# Patient Record
Sex: FEMALE | Race: WHITE | Employment: OTHER | URBAN - NONMETROPOLITAN AREA
[De-identification: names, ages, dates, MRNs, and addresses within clinical notes are randomized per-mention and may not be internally consistent; named-entity substitution may affect disease eponyms.]

---

## 2017-01-13 ENCOUNTER — OFFICE VISIT (OUTPATIENT)
Dept: CARDIOLOGY | Age: 77
End: 2017-01-13
Payer: MEDICARE

## 2017-01-13 VITALS
WEIGHT: 141 LBS | HEART RATE: 68 BPM | HEIGHT: 65 IN | SYSTOLIC BLOOD PRESSURE: 122 MMHG | DIASTOLIC BLOOD PRESSURE: 60 MMHG | BODY MASS INDEX: 23.49 KG/M2

## 2017-01-13 DIAGNOSIS — I25.10 ATHEROSCLEROSIS OF NATIVE CORONARY ARTERY OF NATIVE HEART WITHOUT ANGINA PECTORIS: Primary | ICD-10-CM

## 2017-01-13 DIAGNOSIS — R55 VASOVAGAL SYNCOPE: ICD-10-CM

## 2017-01-13 DIAGNOSIS — I10 ESSENTIAL HYPERTENSION, BENIGN: ICD-10-CM

## 2017-01-13 PROCEDURE — 1090F PRES/ABSN URINE INCON ASSESS: CPT | Performed by: CLINICAL NURSE SPECIALIST

## 2017-01-13 PROCEDURE — G8420 CALC BMI NORM PARAMETERS: HCPCS | Performed by: CLINICAL NURSE SPECIALIST

## 2017-01-13 PROCEDURE — 99213 OFFICE O/P EST LOW 20 MIN: CPT | Performed by: CLINICAL NURSE SPECIALIST

## 2017-01-13 PROCEDURE — G8399 PT W/DXA RESULTS DOCUMENT: HCPCS | Performed by: CLINICAL NURSE SPECIALIST

## 2017-01-13 PROCEDURE — G8599 NO ASA/ANTIPLAT THER USE RNG: HCPCS | Performed by: CLINICAL NURSE SPECIALIST

## 2017-01-13 PROCEDURE — 4040F PNEUMOC VAC/ADMIN/RCVD: CPT | Performed by: CLINICAL NURSE SPECIALIST

## 2017-01-13 PROCEDURE — 93000 ELECTROCARDIOGRAM COMPLETE: CPT | Performed by: CLINICAL NURSE SPECIALIST

## 2017-01-13 PROCEDURE — G8427 DOCREV CUR MEDS BY ELIG CLIN: HCPCS | Performed by: CLINICAL NURSE SPECIALIST

## 2017-01-13 PROCEDURE — 1123F ACP DISCUSS/DSCN MKR DOCD: CPT | Performed by: CLINICAL NURSE SPECIALIST

## 2017-01-13 PROCEDURE — G8484 FLU IMMUNIZE NO ADMIN: HCPCS | Performed by: CLINICAL NURSE SPECIALIST

## 2017-01-13 PROCEDURE — 1036F TOBACCO NON-USER: CPT | Performed by: CLINICAL NURSE SPECIALIST

## 2017-01-13 ASSESSMENT — ENCOUNTER SYMPTOMS
BLURRED VISION: 0
COUGH: 0
HEARTBURN: 0
NAUSEA: 1
SHORTNESS OF BREATH: 0
ORTHOPNEA: 0
VOMITING: 0

## 2017-02-14 ENCOUNTER — TELEPHONE (OUTPATIENT)
Dept: CARDIOLOGY | Age: 77
End: 2017-02-14

## 2017-04-07 ENCOUNTER — OFFICE VISIT (OUTPATIENT)
Dept: CARDIOLOGY | Age: 77
End: 2017-04-07
Payer: MEDICARE

## 2017-04-07 VITALS
BODY MASS INDEX: 23.82 KG/M2 | DIASTOLIC BLOOD PRESSURE: 60 MMHG | WEIGHT: 143 LBS | SYSTOLIC BLOOD PRESSURE: 138 MMHG | HEART RATE: 84 BPM | HEIGHT: 65 IN

## 2017-04-07 DIAGNOSIS — I10 ESSENTIAL HYPERTENSION, BENIGN: ICD-10-CM

## 2017-04-07 DIAGNOSIS — Z95.818 STATUS POST PLACEMENT OF IMPLANTABLE LOOP RECORDER: ICD-10-CM

## 2017-04-07 DIAGNOSIS — I49.9 IRREGULAR HEART RATE: Primary | ICD-10-CM

## 2017-04-07 DIAGNOSIS — I25.10 ATHEROSCLEROSIS OF NATIVE CORONARY ARTERY OF NATIVE HEART WITHOUT ANGINA PECTORIS: ICD-10-CM

## 2017-04-07 PROCEDURE — 1090F PRES/ABSN URINE INCON ASSESS: CPT | Performed by: CLINICAL NURSE SPECIALIST

## 2017-04-07 PROCEDURE — G8420 CALC BMI NORM PARAMETERS: HCPCS | Performed by: CLINICAL NURSE SPECIALIST

## 2017-04-07 PROCEDURE — G8427 DOCREV CUR MEDS BY ELIG CLIN: HCPCS | Performed by: CLINICAL NURSE SPECIALIST

## 2017-04-07 PROCEDURE — 1036F TOBACCO NON-USER: CPT | Performed by: CLINICAL NURSE SPECIALIST

## 2017-04-07 PROCEDURE — 93000 ELECTROCARDIOGRAM COMPLETE: CPT | Performed by: CLINICAL NURSE SPECIALIST

## 2017-04-07 PROCEDURE — 99213 OFFICE O/P EST LOW 20 MIN: CPT | Performed by: CLINICAL NURSE SPECIALIST

## 2017-04-07 PROCEDURE — 1123F ACP DISCUSS/DSCN MKR DOCD: CPT | Performed by: CLINICAL NURSE SPECIALIST

## 2017-04-07 PROCEDURE — 4040F PNEUMOC VAC/ADMIN/RCVD: CPT | Performed by: CLINICAL NURSE SPECIALIST

## 2017-04-07 PROCEDURE — G8399 PT W/DXA RESULTS DOCUMENT: HCPCS | Performed by: CLINICAL NURSE SPECIALIST

## 2017-04-07 PROCEDURE — G8599 NO ASA/ANTIPLAT THER USE RNG: HCPCS | Performed by: CLINICAL NURSE SPECIALIST

## 2017-04-07 RX ORDER — SIMVASTATIN 40 MG
40 TABLET ORAL NIGHTLY
Status: ON HOLD | COMMUNITY
Start: 2017-03-21 | End: 2018-11-02

## 2017-06-25 ENCOUNTER — HOSPITAL ENCOUNTER (EMERGENCY)
Age: 77
Discharge: HOME | End: 2017-06-25
Payer: MEDICARE

## 2017-06-25 ENCOUNTER — APPOINTMENT (OUTPATIENT)
Dept: GENERAL RADIOLOGY | Age: 77
End: 2017-06-25
Payer: MEDICARE

## 2017-06-25 VITALS
WEIGHT: 144 LBS | OXYGEN SATURATION: 94 % | SYSTOLIC BLOOD PRESSURE: 179 MMHG | HEIGHT: 65 IN | TEMPERATURE: 98.1 F | RESPIRATION RATE: 18 BRPM | HEART RATE: 67 BPM | BODY MASS INDEX: 23.99 KG/M2 | DIASTOLIC BLOOD PRESSURE: 70 MMHG

## 2017-06-25 DIAGNOSIS — S62.337A CLOSED DISPLACED FRACTURE OF NECK OF FIFTH METACARPAL BONE OF LEFT HAND, INITIAL ENCOUNTER: Primary | ICD-10-CM

## 2017-06-25 DIAGNOSIS — S80.02XA CONTUSION OF LEFT KNEE, INITIAL ENCOUNTER: ICD-10-CM
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
## 2017-06-25 PROCEDURE — 29125 APPL SHORT ARM SPLINT STATIC: CPT

## 2017-06-25 PROCEDURE — 73560 X-RAY EXAM OF KNEE 1 OR 2: CPT

## 2017-06-25 PROCEDURE — 29125 APPL SHORT ARM SPLINT STATIC: CPT | Performed by: NURSE PRACTITIONER

## 2017-06-25 PROCEDURE — 99283 EMERGENCY DEPT VISIT LOW MDM: CPT

## 2017-06-25 PROCEDURE — 99283 EMERGENCY DEPT VISIT LOW MDM: CPT | Performed by: NURSE PRACTITIONER

## 2017-06-25 PROCEDURE — 73130 X-RAY EXAM OF HAND: CPT

## 2017-06-25 RX ORDER — HYDROCODONE BITARTRATE AND ACETAMINOPHEN 5; 325 MG/1; MG/1
1 TABLET ORAL EVERY 6 HOURS PRN
Qty: 20 TABLET | Refills: 0 | Status: SHIPPED | OUTPATIENT
Start: 2017-06-25 | End: 2017-06-26

## 2017-06-25 ASSESSMENT — PAIN SCALES - GENERAL
PAINLEVEL_OUTOF10: 1
PAINLEVEL_OUTOF10: 8

## 2017-06-25 ASSESSMENT — ENCOUNTER SYMPTOMS: RESPIRATORY NEGATIVE: 1

## 2017-06-25 ASSESSMENT — PAIN DESCRIPTION - PAIN TYPE: TYPE: ACUTE PAIN

## 2017-06-25 ASSESSMENT — PAIN DESCRIPTION - DESCRIPTORS: DESCRIPTORS: ACHING;DULL

## 2017-06-25 ASSESSMENT — PAIN DESCRIPTION - LOCATION: LOCATION: HAND

## 2017-06-25 ASSESSMENT — PAIN DESCRIPTION - ORIENTATION: ORIENTATION: LEFT

## 2017-06-26 ENCOUNTER — OFFICE VISIT (OUTPATIENT)
Dept: VASCULAR SURGERY | Age: 77
End: 2017-06-26
Payer: MEDICARE

## 2017-06-26 ENCOUNTER — HOSPITAL ENCOUNTER (OUTPATIENT)
Dept: VASCULAR LAB | Age: 77
Discharge: HOME | End: 2017-06-26
Payer: MEDICARE

## 2017-06-26 VITALS
TEMPERATURE: 96.2 F | HEART RATE: 60 BPM | DIASTOLIC BLOOD PRESSURE: 52 MMHG | RESPIRATION RATE: 18 BRPM | SYSTOLIC BLOOD PRESSURE: 120 MMHG

## 2017-06-26 DIAGNOSIS — I65.23 BILATERAL CAROTID ARTERY STENOSIS: Primary | ICD-10-CM

## 2017-06-26 DIAGNOSIS — I65.23 BILATERAL CAROTID ARTERY STENOSIS: ICD-10-CM

## 2017-06-26 PROCEDURE — G8427 DOCREV CUR MEDS BY ELIG CLIN: HCPCS | Performed by: NURSE PRACTITIONER

## 2017-06-26 PROCEDURE — G8599 NO ASA/ANTIPLAT THER USE RNG: HCPCS | Performed by: NURSE PRACTITIONER

## 2017-06-26 PROCEDURE — 99212 OFFICE O/P EST SF 10 MIN: CPT | Performed by: NURSE PRACTITIONER

## 2017-06-26 PROCEDURE — 1123F ACP DISCUSS/DSCN MKR DOCD: CPT | Performed by: NURSE PRACTITIONER

## 2017-06-26 PROCEDURE — 1036F TOBACCO NON-USER: CPT | Performed by: NURSE PRACTITIONER

## 2017-06-26 PROCEDURE — G8420 CALC BMI NORM PARAMETERS: HCPCS | Performed by: NURSE PRACTITIONER

## 2017-06-26 PROCEDURE — 4040F PNEUMOC VAC/ADMIN/RCVD: CPT | Performed by: NURSE PRACTITIONER

## 2017-06-26 PROCEDURE — 93880 EXTRACRANIAL BILAT STUDY: CPT

## 2017-06-26 PROCEDURE — G8399 PT W/DXA RESULTS DOCUMENT: HCPCS | Performed by: NURSE PRACTITIONER

## 2017-06-26 PROCEDURE — 1090F PRES/ABSN URINE INCON ASSESS: CPT | Performed by: NURSE PRACTITIONER

## 2017-07-31 ENCOUNTER — APPOINTMENT (OUTPATIENT)
Dept: CT IMAGING | Age: 77
End: 2017-07-31
Payer: MEDICARE

## 2017-07-31 ENCOUNTER — HOSPITAL ENCOUNTER (EMERGENCY)
Age: 77
Discharge: HOME | End: 2017-08-01
Attending: EMERGENCY MEDICINE
Payer: MEDICARE
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
## 2017-07-31 DIAGNOSIS — S02.40DA MAXILLARY FRACTURE, LEFT SIDE, INITIAL ENCOUNTER FOR CLOSED FRACTURE (HCC): ICD-10-CM

## 2017-07-31 DIAGNOSIS — M79.602 LEFT ARM PAIN: ICD-10-CM

## 2017-07-31 DIAGNOSIS — S09.90XA CLOSED HEAD INJURY, INITIAL ENCOUNTER: ICD-10-CM

## 2017-07-31 PROCEDURE — 70450 CT HEAD/BRAIN W/O DYE: CPT

## 2017-07-31 PROCEDURE — 99284 EMERGENCY DEPT VISIT MOD MDM: CPT

## 2017-07-31 PROCEDURE — 72125 CT NECK SPINE W/O DYE: CPT

## 2017-07-31 PROCEDURE — 70486 CT MAXILLOFACIAL W/O DYE: CPT

## 2017-07-31 RX ORDER — ACETAMINOPHEN 500 MG
1000 TABLET ORAL ONCE
Status: COMPLETED | OUTPATIENT
Start: 2017-07-31 | End: 2017-08-01

## 2017-07-31 ASSESSMENT — PAIN SCALES - GENERAL: PAINLEVEL_OUTOF10: 9

## 2017-07-31 ASSESSMENT — ENCOUNTER SYMPTOMS
NAUSEA: 0
APNEA: 0
BLOOD IN STOOL: 0
EYE DISCHARGE: 0
CONSTIPATION: 0
VOICE CHANGE: 0
DIARRHEA: 0
SINUS PRESSURE: 0
FACIAL SWELLING: 0
VOMITING: 0
SORE THROAT: 0
CHOKING: 0

## 2017-07-31 ASSESSMENT — PAIN DESCRIPTION - LOCATION: LOCATION: FACE

## 2017-07-31 ASSESSMENT — PAIN DESCRIPTION - PAIN TYPE: TYPE: ACUTE PAIN

## 2017-08-01 ENCOUNTER — APPOINTMENT (OUTPATIENT)
Dept: GENERAL RADIOLOGY | Age: 77
End: 2017-08-01
Payer: MEDICARE

## 2017-08-01 VITALS
OXYGEN SATURATION: 96 % | RESPIRATION RATE: 16 BRPM | BODY MASS INDEX: 23.32 KG/M2 | SYSTOLIC BLOOD PRESSURE: 169 MMHG | DIASTOLIC BLOOD PRESSURE: 64 MMHG | TEMPERATURE: 97.2 F | HEIGHT: 65 IN | WEIGHT: 140 LBS | HEART RATE: 79 BPM

## 2017-08-01 PROCEDURE — 73060 X-RAY EXAM OF HUMERUS: CPT

## 2017-08-01 PROCEDURE — 73130 X-RAY EXAM OF HAND: CPT

## 2017-08-01 PROCEDURE — 6370000000 HC RX 637 (ALT 250 FOR IP): Performed by: EMERGENCY MEDICINE

## 2017-08-01 PROCEDURE — 99283 EMERGENCY DEPT VISIT LOW MDM: CPT | Performed by: EMERGENCY MEDICINE

## 2017-08-01 RX ORDER — OXYMETAZOLINE HYDROCHLORIDE 0.05 G/100ML
2 SPRAY NASAL ONCE
Status: COMPLETED | OUTPATIENT
Start: 2017-08-01 | End: 2017-08-01

## 2017-08-01 RX ORDER — ACETAMINOPHEN AND CODEINE PHOSPHATE 300; 30 MG/1; MG/1
1 TABLET ORAL EVERY 6 HOURS PRN
Qty: 15 TABLET | Refills: 0 | Status: SHIPPED | OUTPATIENT
Start: 2017-08-01 | End: 2017-12-30

## 2017-08-01 RX ORDER — CLONIDINE HYDROCHLORIDE 0.1 MG/1
0.1 TABLET ORAL ONCE
Status: COMPLETED | OUTPATIENT
Start: 2017-08-01 | End: 2017-08-01

## 2017-08-01 RX ADMIN — ACETAMINOPHEN 1000 MG: 500 TABLET ORAL at 00:00

## 2017-08-01 RX ADMIN — CLONIDINE HYDROCHLORIDE 0.1 MG: 0.1 TABLET ORAL at 00:12

## 2017-08-01 RX ADMIN — OXYMETAZOLINE HYDROCHLORIDE 2 SPRAY: 5 SPRAY NASAL at 01:23

## 2017-08-01 ASSESSMENT — PAIN SCALES - GENERAL: PAINLEVEL_OUTOF10: 4

## 2017-08-01 ASSESSMENT — PAIN DESCRIPTION - PAIN TYPE: TYPE: ACUTE PAIN

## 2017-09-12 RX ORDER — NITROGLYCERIN 0.4 MG/1
TABLET SUBLINGUAL
COMMUNITY
Start: 2015-09-03

## 2017-09-12 RX ORDER — ISOSORBIDE MONONITRATE 30 MG/1
TABLET, EXTENDED RELEASE ORAL
COMMUNITY

## 2017-09-12 RX ORDER — CLOPIDOGREL BISULFATE 75 MG/1
TABLET ORAL
COMMUNITY
Start: 2011-07-20

## 2017-09-12 RX ORDER — LISINOPRIL 20 MG/1
TABLET ORAL
COMMUNITY
Start: 2013-03-09

## 2017-09-12 RX ORDER — ATENOLOL 25 MG/1
TABLET ORAL
COMMUNITY
Start: 2016-03-03

## 2017-09-12 RX ORDER — SIMVASTATIN 40 MG
TABLET ORAL
COMMUNITY
Start: 2017-03-21

## 2017-09-14 ENCOUNTER — OFFICE VISIT (OUTPATIENT)
Dept: OTOLARYNGOLOGY | Facility: CLINIC | Age: 77
End: 2017-09-14

## 2017-09-14 VITALS — SYSTOLIC BLOOD PRESSURE: 162 MMHG | HEIGHT: 65 IN | DIASTOLIC BLOOD PRESSURE: 73 MMHG | TEMPERATURE: 97.4 F

## 2017-09-14 DIAGNOSIS — S02.401A CLOSED FRACTURE OF MAXILLA, INITIAL ENCOUNTER (HCC): Primary | ICD-10-CM

## 2017-09-14 PROCEDURE — 99202 OFFICE O/P NEW SF 15 MIN: CPT | Performed by: OTOLARYNGOLOGY

## 2017-09-14 NOTE — PROGRESS NOTES
Patient Care Team:  Sukh Joyce MD as PCP - General (Family Medicine)  Javier Forman MD (Otolaryngology)    Chief Complaint   Patient presents with   • Fracture     Pt states that she has multiple facial fractures from a fall.  She states that it has been about a month ago that this occured and she is still having some facial pain       Subjective   HPI   Tatyana Orozco is a  77 y.o.  female who presents for evaluation of recent trauma. She was involved in a a fall approximately 4 weeks ago. The injury is focussed at the left  cheek. Patient's symptoms include discomfort in the affected area. she has had  mild to moderate symptoms. Patient denies change in occlusion, change in ability to breath through nose and visual changes.     Review of Systems   Constitutional: Negative.    HENT: Positive for facial swelling and tinnitus.         As per HPI   Eyes: Negative.  Negative for visual disturbance.   Respiratory: Negative.    Cardiovascular: Negative.    Gastrointestinal: Negative.    Skin: Negative.    Neurological: Negative.  Negative for numbness.   Hematological: Negative.    Psychiatric/Behavioral: Negative.        Past History:  Past Medical History:   Diagnosis Date   • B12 deficiency    • Breast cancer    • Diabetes mellitus    • Hypertension      Past Surgical History:   Procedure Laterality Date   • APPENDECTOMY     • BREAST SURGERY Right     total mastectomy   • CARDIAC SURGERY      bypass (x2)   • CHOLECYSTECTOMY     • COLON SURGERY      partial colon removal     Family History   Problem Relation Age of Onset   • Breast cancer Mother    • Diabetes Mother    • Hypertension Mother    • Hypertension Sister    • Colon cancer Brother    • Hypertension Brother    • Diabetes Daughter      Social History   Substance Use Topics   • Smoking status: Former Smoker     Quit date: 9/12/1977   • Smokeless tobacco: Never Used   • Alcohol use No       Current Outpatient Prescriptions:   •  atenolol (TENORMIN) 25  MG tablet, Take  by mouth., Disp: , Rfl:   •  clopidogrel (PLAVIX) 75 MG tablet, Take  by mouth., Disp: , Rfl:   •  Insulin Glargine (LANTUS SOLOSTAR) 100 UNIT/ML injection pen, Inject  under the skin., Disp: , Rfl:   •  isosorbide mononitrate (IMDUR) 30 MG 24 hr tablet, Take  by mouth., Disp: , Rfl:   •  lisinopril (PRINIVIL,ZESTRIL) 20 MG tablet, Take  by mouth., Disp: , Rfl:   •  nitroglycerin (NITROSTAT) 0.4 MG SL tablet, Place  under the tongue., Disp: , Rfl:   •  PARoxetine HCl (PAXIL PO), Take 20 mg by mouth Daily., Disp: , Rfl:   •  simvastatin (ZOCOR) 40 MG tablet, , Disp: , Rfl:   Allergies:  Review of patient's allergies indicates no known allergies.    Objective     Vital Signs:  Temp:  [97.4 °F (36.3 °C)] 97.4 °F (36.3 °C)  BP: (162)/(73) 162/73    Physical Exam  CONSTITUTIONAL: well nourished, well-developed, alert, oriented, in no acute distress   COMMUNICATION AND VOICE: able to communicate normally, normal voice quality  HEAD: normocephalic, no lesions, atraumatic, no tenderness, no masses   FACE: structure normal, no lesions present, mild left cheek tenderness present, no deformity or stepoff present  SALIVARY GLANDS: parotid glands with no tenderness, no swelling, no masses, submandibular glands with normal size, nontender  EYES: ocular motility normal, eyelids normal, orbits normal, no proptosis, conjunctiva normal , pupils equal, round  HEARING: response to conversational voice normal bilaterally   EXTERNAL EARS: auricles without lesions  EXTERNAL EAR CANALS: normal ear canals without stenosis or significant cerumen  TYMPANIC MEMBRANES: tympanic membrane appearance normal, no lesions, no perforation, normal mobility, no fluid  EXTERNAL NOSE: structure normal, no tenderness on palpation, no nasal discharge, no lesions, no evidence of trauma, nostrils patent  INTRANASAL EXAM: nasal mucosa normal, vestibule within normal limits, inferior turbinate normal, nasal septum midline   LIPS: structure  normal, no tenderness on palpation, no lesions, no evidence of trauma  TEETH: edentulous  GUMS: gingivae healthy  ORAL MUCOSA: oral mucosa normal, no mucosal lesions  FLOOR OF MOUTH: Warthin’s duct patent, mucosa normal  TONGUE: lingual mucosa normal without lesions, normal tongue mobility  PALATE: soft and hard palates with normal mucosa and structure  OROPHARYNX: oropharyngeal mucosa normal, tonsils normal in appearance  NECK: neck appearance normal, no masses or tenderness  THYROID: no overt thyromegaly, no tenderness, nodules or mass present on palpation, position midline   LYMPH NODES: no lymphadenopathy  CHEST/RESPIRATORY: respiratory effort normal  CARDIOVASCULAR: extremities without cyanosis or edema, no overt jugulovenous distension present  NEUROLOGIC/PSYCHIATRIC: oriented appropriately for age, mood normal, affect appropriate, cranial nerves intact grossly unless specifically mentioned above     RESULTS REVIEW:    I have personally reviewed the patient's ct scan of the facial bones. there was a mild non displaced left maxillary wall fracture    Assessment   1. Closed fracture of maxilla, initial encounter        Plan   Conservative management.  Ice prn    Return if symptoms worsen or fail to improve.    Javier Fomran MD  09/14/17  5:15 PM

## 2017-09-25 ENCOUNTER — HOSPITAL ENCOUNTER (OUTPATIENT)
Dept: GENERAL RADIOLOGY | Age: 77
Discharge: HOME | End: 2017-09-25
Payer: MEDICARE

## 2017-09-25 DIAGNOSIS — R05.9 COUGH: ICD-10-CM

## 2017-09-25 PROCEDURE — 71020 XR CHEST STANDARD TWO VW: CPT

## 2017-10-17 ENCOUNTER — OFFICE VISIT (OUTPATIENT)
Dept: CARDIOLOGY | Age: 77
End: 2017-10-17
Payer: MEDICARE

## 2017-10-17 VITALS
HEIGHT: 65 IN | WEIGHT: 141 LBS | HEART RATE: 62 BPM | SYSTOLIC BLOOD PRESSURE: 120 MMHG | DIASTOLIC BLOOD PRESSURE: 60 MMHG | BODY MASS INDEX: 23.49 KG/M2

## 2017-10-17 DIAGNOSIS — Z95.818 STATUS POST PLACEMENT OF IMPLANTABLE LOOP RECORDER: ICD-10-CM

## 2017-10-17 DIAGNOSIS — I10 ESSENTIAL HYPERTENSION, BENIGN: Primary | ICD-10-CM

## 2017-10-17 DIAGNOSIS — E78.2 MIXED HYPERLIPIDEMIA: ICD-10-CM

## 2017-10-17 DIAGNOSIS — I25.10 ATHEROSCLEROSIS OF NATIVE CORONARY ARTERY OF NATIVE HEART WITHOUT ANGINA PECTORIS: ICD-10-CM

## 2017-10-17 PROCEDURE — G8484 FLU IMMUNIZE NO ADMIN: HCPCS | Performed by: NURSE PRACTITIONER

## 2017-10-17 PROCEDURE — 1090F PRES/ABSN URINE INCON ASSESS: CPT | Performed by: NURSE PRACTITIONER

## 2017-10-17 PROCEDURE — G8427 DOCREV CUR MEDS BY ELIG CLIN: HCPCS | Performed by: NURSE PRACTITIONER

## 2017-10-17 PROCEDURE — G8420 CALC BMI NORM PARAMETERS: HCPCS | Performed by: NURSE PRACTITIONER

## 2017-10-17 PROCEDURE — 4040F PNEUMOC VAC/ADMIN/RCVD: CPT | Performed by: NURSE PRACTITIONER

## 2017-10-17 PROCEDURE — G8599 NO ASA/ANTIPLAT THER USE RNG: HCPCS | Performed by: NURSE PRACTITIONER

## 2017-10-17 PROCEDURE — 93000 ELECTROCARDIOGRAM COMPLETE: CPT | Performed by: NURSE PRACTITIONER

## 2017-10-17 PROCEDURE — 1123F ACP DISCUSS/DSCN MKR DOCD: CPT | Performed by: NURSE PRACTITIONER

## 2017-10-17 PROCEDURE — G8399 PT W/DXA RESULTS DOCUMENT: HCPCS | Performed by: NURSE PRACTITIONER

## 2017-10-17 PROCEDURE — 1036F TOBACCO NON-USER: CPT | Performed by: NURSE PRACTITIONER

## 2017-10-17 PROCEDURE — 99213 OFFICE O/P EST LOW 20 MIN: CPT | Performed by: NURSE PRACTITIONER

## 2017-10-17 NOTE — PROGRESS NOTES
Mother     Cancer Mother     Cancer Sister     Coronary Art Dis Brother     Cancer Brother     Coronary Art Dis Maternal Grandmother     Coronary Art Dis Maternal Grandfather     Diabetes Daughter        Social History     Social History    Marital status:      Spouse name: N/A    Number of children: N/A    Years of education: N/A     Occupational History    Not on file. Social History Main Topics    Smoking status: Former Smoker    Smokeless tobacco: Not on file      Comment: quit around 1976.  Alcohol use No    Drug use: No    Sexual activity: Not on file     Other Topics Concern    Not on file     Social History Narrative    No narrative on file       Allergies   Allergen Reactions    Tetanus Toxoids          Current Outpatient Prescriptions:     acetaminophen-codeine (TYLENOL/CODEINE #3) 300-30 MG per tablet, Take 1 tablet by mouth every 6 hours as needed for Pain, Disp: 15 tablet, Rfl: 0    simvastatin (ZOCOR) 40 MG tablet, , Disp: , Rfl:     insulin NPH (HUMULIN N;NOVOLIN N) 100 UNIT/ML injection vial, Inject 20 Units into the skin every morning, Disp: 1 vial, Rfl: 3    Insulin Lispro, Human, (HUMALOG SC), Inject into the skin, Disp: , Rfl:     atenolol (TENORMIN) 25 MG tablet, Take 1 tablet by mouth nightly, Disp: 30 tablet, Rfl: 5    nitroGLYCERIN (NITROSTAT) 0.4 MG SL tablet, Place 1 tablet under the tongue every 5 minutes as needed, Disp: 25 tablet, Rfl: 3    lisinopril (PRINIVIL;ZESTRIL) 20 MG tablet, Take 10 mg by mouth Daily , Disp: , Rfl:     clopidogrel (PLAVIX) 75 MG tablet, Take 1 tablet by mouth daily. , Disp: 90 tablet, Rfl: 1    paroxetine (PAXIL) 10 MG tablet, Take 20 mg by mouth every morning.  , Disp: , Rfl:     isosorbide mononitrate (IMDUR) 30 MG CR tablet, Take 30 mg by mouth daily.   , Disp: , Rfl:     PE:  Vitals:    10/17/17 0932   BP: 120/60   Pulse: 62       Estimated body mass index is 23.46 kg/m² as calculated from the following:    Height as of this encounter: 5' 5\" (1.651 m). Weight as of this encounter: 141 lb (64 kg). Constitutional: She is oriented to person, place, and time. She appears well-developed and well-nourished in no acute distress. Head: Normocephalic and atraumatic. Neck:  Neck supple without JVD present. Cardiovascular: Normal rate, regular rhythm, normal heart sounds. no murmur ascultated. No gallop and no friction rub.  no carotid bruits. no peripheral edema. Pulmonary/Chest:  Lungs clear to auscultation bilaterally without evidence of respiratory distress. She without wheezes. She without rales or ronchi. Musculoskeletal: Normal range of motion. Gait is normal no assitive device. Neurological: She is alert and oriented to person, place, and time. Skin: Skin is warm and dry without rash or pallor. Psychiatric: She has a normal mood and affect. Her behavior is normal. Thought content normal.     Lab Results   Component Value Date    CREATININE 1.1 03/28/2016    CREATININE 1.2 03/27/2016    CREATININE 1.3 03/26/2016    CREATININE 0.9 07/22/2011    HGB 12.2 03/28/2016    HGB 11.5 03/27/2016    HGB 15.0 03/24/2016       ECG 10/17/17  Normal sinus rhythm and 62 BPM    Assessment    1. Essential hypertension, benign    2. Mixed hyperlipidemia    3. Atherosclerosis of native coronary artery of native heart without angina pectoris    4. Status post placement of implantable loop recorder          Plan:    Continue current medications as prescribed. Stable Cardiovascular status  Discussed again about the loop recorder. She would like to leave it in place at this time. Disposition - RTC in 6 months with Dr. Queta Acuña or sooner if needed    Please do not hesitate to contact me for any questions or concerns.     Sincerely yours,    LELE Jaquez

## 2017-12-30 ENCOUNTER — HOSPITAL ENCOUNTER (OUTPATIENT)
Age: 77
Setting detail: OBSERVATION
Discharge: HOME | End: 2018-01-01
Attending: EMERGENCY MEDICINE | Admitting: FAMILY MEDICINE
Payer: MEDICARE

## 2017-12-30 ENCOUNTER — APPOINTMENT (OUTPATIENT)
Dept: GENERAL RADIOLOGY | Age: 77
End: 2017-12-30
Payer: MEDICARE

## 2017-12-30 ENCOUNTER — APPOINTMENT (OUTPATIENT)
Dept: CT IMAGING | Age: 77
End: 2017-12-30
Payer: MEDICARE

## 2017-12-30 DIAGNOSIS — Z98.890 HISTORY OF CEA (CAROTID ENDARTERECTOMY): ICD-10-CM

## 2017-12-30 DIAGNOSIS — N28.9 RENAL INSUFFICIENCY: ICD-10-CM

## 2017-12-30 DIAGNOSIS — G45.9 TRANSIENT CEREBRAL ISCHEMIA, UNSPECIFIED TYPE: Primary | ICD-10-CM

## 2017-12-30 LAB
ALBUMIN SERPL-MCNC: 4 G/DL (ref 3.5–5.2)
ALP BLD-CCNC: 70 U/L (ref 35–104)
ALT SERPL-CCNC: 9 U/L (ref 5–33)
ANION GAP SERPL CALCULATED.3IONS-SCNC: 14 MMOL/L (ref 7–19)
APTT: 22.7 SEC (ref 26–36.2)
AST SERPL-CCNC: 15 U/L (ref 5–32)
BASOPHILS ABSOLUTE: 0.1 K/UL (ref 0–0.2)
BASOPHILS RELATIVE PERCENT: 1.2 % (ref 0–1)
BILIRUB SERPL-MCNC: 0.8 MG/DL (ref 0.2–1.2)
BUN BLDV-MCNC: 11 MG/DL (ref 8–23)
CALCIUM SERPL-MCNC: 8.8 MG/DL (ref 8.8–10.2)
CHLORIDE BLD-SCNC: 97 MMOL/L (ref 98–111)
CO2: 27 MMOL/L (ref 22–29)
CREAT SERPL-MCNC: 1.4 MG/DL (ref 0.5–0.9)
EOSINOPHILS ABSOLUTE: 0.1 K/UL (ref 0–0.6)
EOSINOPHILS RELATIVE PERCENT: 1.3 % (ref 0–5)
GFR NON-AFRICAN AMERICAN: 36
GLUCOSE BLD-MCNC: 243 MG/DL (ref 74–109)
HCT VFR BLD CALC: 41.2 % (ref 37–47)
HEMOGLOBIN: 13.6 G/DL (ref 12–16)
INR BLD: 0.93 (ref 0.88–1.18)
LYMPHOCYTES ABSOLUTE: 1.2 K/UL (ref 1.1–4.5)
LYMPHOCYTES RELATIVE PERCENT: 19.7 % (ref 20–40)
MCH RBC QN AUTO: 29 PG (ref 27–31)
MCHC RBC AUTO-ENTMCNC: 33 G/DL (ref 33–37)
MCV RBC AUTO: 87.8 FL (ref 81–99)
MONOCYTES ABSOLUTE: 0.7 K/UL (ref 0–0.9)
MONOCYTES RELATIVE PERCENT: 11.9 % (ref 0–10)
NEUTROPHILS ABSOLUTE: 4 K/UL (ref 1.5–7.5)
NEUTROPHILS RELATIVE PERCENT: 65.6 % (ref 50–65)
PDW BLD-RTO: 12.8 % (ref 11.5–14.5)
PLATELET # BLD: 180 K/UL (ref 130–400)
PMV BLD AUTO: 10.2 FL (ref 9.4–12.3)
POTASSIUM SERPL-SCNC: 4.2 MMOL/L (ref 3.5–5)
PRO-BNP: 1274 PG/ML (ref 0–1800)
PROTHROMBIN TIME: 12.4 SEC (ref 12–14.6)
RBC # BLD: 4.69 M/UL (ref 4.2–5.4)
SODIUM BLD-SCNC: 138 MMOL/L (ref 136–145)
TOTAL PROTEIN: 6.6 G/DL (ref 6.6–8.7)
WBC # BLD: 6 K/UL (ref 4.8–10.8)

## 2017-12-30 PROCEDURE — 83880 ASSAY OF NATRIURETIC PEPTIDE: CPT

## 2017-12-30 PROCEDURE — G0378 HOSPITAL OBSERVATION PER HR: HCPCS

## 2017-12-30 PROCEDURE — 6360000002 HC RX W HCPCS: Performed by: EMERGENCY MEDICINE

## 2017-12-30 PROCEDURE — 80053 COMPREHEN METABOLIC PANEL: CPT

## 2017-12-30 PROCEDURE — 99285 EMERGENCY DEPT VISIT HI MDM: CPT | Performed by: EMERGENCY MEDICINE

## 2017-12-30 PROCEDURE — 2580000003 HC RX 258: Performed by: FAMILY MEDICINE

## 2017-12-30 PROCEDURE — 99285 EMERGENCY DEPT VISIT HI MDM: CPT

## 2017-12-30 PROCEDURE — 85730 THROMBOPLASTIN TIME PARTIAL: CPT

## 2017-12-30 PROCEDURE — 93005 ELECTROCARDIOGRAM TRACING: CPT

## 2017-12-30 PROCEDURE — 71010 XR CHEST PORTABLE: CPT

## 2017-12-30 PROCEDURE — 70450 CT HEAD/BRAIN W/O DYE: CPT

## 2017-12-30 PROCEDURE — 85610 PROTHROMBIN TIME: CPT

## 2017-12-30 PROCEDURE — 85025 COMPLETE CBC W/AUTO DIFF WBC: CPT

## 2017-12-30 PROCEDURE — 96372 THER/PROPH/DIAG INJ SC/IM: CPT

## 2017-12-30 PROCEDURE — 36415 COLL VENOUS BLD VENIPUNCTURE: CPT

## 2017-12-30 RX ORDER — DOXEPIN HYDROCHLORIDE 10 MG/1
10 CAPSULE ORAL NIGHTLY
COMMUNITY

## 2017-12-30 RX ORDER — ACETAMINOPHEN 325 MG/1
650 TABLET ORAL EVERY 4 HOURS PRN
Status: DISCONTINUED | OUTPATIENT
Start: 2017-12-30 | End: 2018-01-01 | Stop reason: HOSPADM

## 2017-12-30 RX ORDER — SODIUM CHLORIDE 0.9 % (FLUSH) 0.9 %
10 SYRINGE (ML) INJECTION PRN
Status: DISCONTINUED | OUTPATIENT
Start: 2017-12-30 | End: 2018-01-01 | Stop reason: HOSPADM

## 2017-12-30 RX ORDER — ACARBOSE 25 MG/1
25 TABLET ORAL
Status: ON HOLD | COMMUNITY
End: 2018-01-01 | Stop reason: HOSPADM

## 2017-12-30 RX ORDER — SODIUM CHLORIDE 0.9 % (FLUSH) 0.9 %
10 SYRINGE (ML) INJECTION EVERY 12 HOURS SCHEDULED
Status: DISCONTINUED | OUTPATIENT
Start: 2017-12-30 | End: 2018-01-01 | Stop reason: HOSPADM

## 2017-12-30 RX ADMIN — Medication 10 ML: at 22:17

## 2017-12-30 RX ADMIN — ENOXAPARIN SODIUM 60 MG: 60 INJECTION SUBCUTANEOUS at 18:17

## 2017-12-30 ASSESSMENT — ENCOUNTER SYMPTOMS
ABDOMINAL PAIN: 0
SHORTNESS OF BREATH: 0

## 2017-12-31 LAB
BACTERIA: NEGATIVE /HPF
BILIRUBIN URINE: NEGATIVE
BLOOD, URINE: NEGATIVE
CHOLESTEROL, TOTAL: 158 MG/DL (ref 160–199)
CLARITY: CLEAR
COLOR: YELLOW
EPITHELIAL CELLS, UA: 2 /HPF (ref 0–5)
GLUCOSE BLD-MCNC: 205 MG/DL (ref 70–99)
GLUCOSE BLD-MCNC: 222 MG/DL (ref 70–99)
GLUCOSE BLD-MCNC: 270 MG/DL (ref 70–99)
GLUCOSE URINE: 500 MG/DL
HBA1C MFR BLD: 11.5 %
HCT VFR BLD CALC: 36.3 % (ref 37–47)
HDLC SERPL-MCNC: 31 MG/DL (ref 65–121)
HYALINE CASTS: 2 /HPF (ref 0–8)
KETONES, URINE: NEGATIVE MG/DL
LDL CHOLESTEROL CALCULATED: 76 MG/DL
LEUKOCYTE ESTERASE, URINE: ABNORMAL
LV EF: 63 %
LVEF MODALITY: NORMAL
NITRITE, URINE: NEGATIVE
P2Y12 RESULT: 170 PRU (ref 194–418)
PERFORMED ON: ABNORMAL
PH UA: 6
PLATELET # BLD: 143 K/UL (ref 130–400)
PROTEIN UA: NEGATIVE MG/DL
RBC UA: 1 /HPF (ref 0–4)
SPECIFIC GRAVITY UA: 1.01
TRIGL SERPL-MCNC: 254 MG/DL (ref 0–149)
UROBILINOGEN, URINE: 0.2 E.U./DL
WBC UA: 11 /HPF (ref 0–5)

## 2017-12-31 PROCEDURE — 87086 URINE CULTURE/COLONY COUNT: CPT

## 2017-12-31 PROCEDURE — 36415 COLL VENOUS BLD VENIPUNCTURE: CPT

## 2017-12-31 PROCEDURE — 6370000000 HC RX 637 (ALT 250 FOR IP): Performed by: FAMILY MEDICINE

## 2017-12-31 PROCEDURE — 6360000002 HC RX W HCPCS: Performed by: EMERGENCY MEDICINE

## 2017-12-31 PROCEDURE — 85049 AUTOMATED PLATELET COUNT: CPT

## 2017-12-31 PROCEDURE — 2580000003 HC RX 258: Performed by: FAMILY MEDICINE

## 2017-12-31 PROCEDURE — 83036 HEMOGLOBIN GLYCOSYLATED A1C: CPT

## 2017-12-31 PROCEDURE — 82948 REAGENT STRIP/BLOOD GLUCOSE: CPT

## 2017-12-31 PROCEDURE — 93880 EXTRACRANIAL BILAT STUDY: CPT

## 2017-12-31 PROCEDURE — 93306 TTE W/DOPPLER COMPLETE: CPT

## 2017-12-31 PROCEDURE — 81001 URINALYSIS AUTO W/SCOPE: CPT

## 2017-12-31 PROCEDURE — 99205 OFFICE O/P NEW HI 60 MIN: CPT | Performed by: PSYCHIATRY & NEUROLOGY

## 2017-12-31 PROCEDURE — 80061 LIPID PANEL: CPT

## 2017-12-31 PROCEDURE — 85576 BLOOD PLATELET AGGREGATION: CPT

## 2017-12-31 PROCEDURE — G0378 HOSPITAL OBSERVATION PER HR: HCPCS

## 2017-12-31 PROCEDURE — 85014 HEMATOCRIT: CPT

## 2017-12-31 PROCEDURE — 96372 THER/PROPH/DIAG INJ SC/IM: CPT

## 2017-12-31 RX ORDER — ACARBOSE 25 MG/1
25 TABLET ORAL
Status: DISCONTINUED | OUTPATIENT
Start: 2017-12-31 | End: 2018-01-01

## 2017-12-31 RX ORDER — DEXTROSE MONOHYDRATE 50 MG/ML
100 INJECTION, SOLUTION INTRAVENOUS PRN
Status: DISCONTINUED | OUTPATIENT
Start: 2017-12-31 | End: 2018-01-01 | Stop reason: HOSPADM

## 2017-12-31 RX ORDER — DOXEPIN HYDROCHLORIDE 10 MG/1
10 CAPSULE ORAL NIGHTLY
Status: DISCONTINUED | OUTPATIENT
Start: 2017-12-31 | End: 2018-01-01 | Stop reason: HOSPADM

## 2017-12-31 RX ORDER — NICOTINE POLACRILEX 4 MG
15 LOZENGE BUCCAL PRN
Status: DISCONTINUED | OUTPATIENT
Start: 2017-12-31 | End: 2018-01-01 | Stop reason: HOSPADM

## 2017-12-31 RX ORDER — SIMVASTATIN 40 MG
40 TABLET ORAL NIGHTLY
Status: DISCONTINUED | OUTPATIENT
Start: 2017-12-31 | End: 2018-01-01 | Stop reason: HOSPADM

## 2017-12-31 RX ORDER — PAROXETINE HYDROCHLORIDE 20 MG/1
20 TABLET, FILM COATED ORAL EVERY MORNING
Status: DISCONTINUED | OUTPATIENT
Start: 2017-12-31 | End: 2018-01-01 | Stop reason: HOSPADM

## 2017-12-31 RX ORDER — DEXTROSE MONOHYDRATE 25 G/50ML
12.5 INJECTION, SOLUTION INTRAVENOUS PRN
Status: DISCONTINUED | OUTPATIENT
Start: 2017-12-31 | End: 2018-01-01 | Stop reason: HOSPADM

## 2017-12-31 RX ORDER — LISINOPRIL 10 MG/1
10 TABLET ORAL DAILY
Status: DISCONTINUED | OUTPATIENT
Start: 2017-12-31 | End: 2018-01-01 | Stop reason: HOSPADM

## 2017-12-31 RX ORDER — ISOSORBIDE MONONITRATE 30 MG/1
30 TABLET, EXTENDED RELEASE ORAL DAILY
Status: DISCONTINUED | OUTPATIENT
Start: 2017-12-31 | End: 2018-01-01 | Stop reason: HOSPADM

## 2017-12-31 RX ORDER — CLOPIDOGREL BISULFATE 75 MG/1
75 TABLET ORAL DAILY
Status: DISCONTINUED | OUTPATIENT
Start: 2017-12-31 | End: 2018-01-01 | Stop reason: HOSPADM

## 2017-12-31 RX ADMIN — LISINOPRIL 10 MG: 10 TABLET ORAL at 11:23

## 2017-12-31 RX ADMIN — INSULIN LISPRO 2 UNITS: 100 INJECTION, SOLUTION INTRAVENOUS; SUBCUTANEOUS at 21:14

## 2017-12-31 RX ADMIN — Medication 10 ML: at 21:15

## 2017-12-31 RX ADMIN — CLOPIDOGREL BISULFATE 75 MG: 75 TABLET ORAL at 11:23

## 2017-12-31 RX ADMIN — CHOLECALCIFEROL CAP 125 MCG (5000 UNIT) 5000 UNITS: 125 CAP at 11:23

## 2017-12-31 RX ADMIN — ENOXAPARIN SODIUM 60 MG: 60 INJECTION SUBCUTANEOUS at 18:21

## 2017-12-31 RX ADMIN — DOXEPIN HYDROCHLORIDE 10 MG: 10 CAPSULE ORAL at 21:15

## 2017-12-31 RX ADMIN — SIMVASTATIN 40 MG: 40 TABLET, FILM COATED ORAL at 21:14

## 2017-12-31 RX ADMIN — INSULIN LISPRO 4 UNITS: 100 INJECTION, SOLUTION INTRAVENOUS; SUBCUTANEOUS at 18:00

## 2017-12-31 RX ADMIN — PAROXETINE 20 MG: 20 TABLET, FILM COATED ORAL at 11:23

## 2017-12-31 RX ADMIN — INSULIN LISPRO 6 UNITS: 100 INJECTION, SOLUTION INTRAVENOUS; SUBCUTANEOUS at 12:36

## 2017-12-31 NOTE — CONSULTS
[address], [address]  [place], [place]  Phone [phone]     Neuology Consultation     Date of Admission: 2017  4:15 PM  Date of Consultation: 17    Attending Provider: Alvin Yuan MD  Consulting Provider: Montrell Rdz M.D. Patient: Lm Staton  :  1940  Age:  76 y.o. MRN:  [id number]    CHIEF COMPLAINT:  TIA    History Source: History obtained from chart review, the patient and her daughter. PCP: Alvin Yuan MD    HISTORY OF PRESENT ILLNESS:   Lm Staton is a 76y.o. year old woman with a history of hypertension, hyperlipidemia, diabetes, bilateral carotid endarterectomies, and cardiovascular disease who was admitted  after a TIA. Her daughter resides with her. Yesterday she developed numbness and tingling she says all over but predominantly in her head and face. She has had this before and it usually lasts a few minutes and resolves. She does not know if these episodes are related to anxiety. In any event during that spell, she developed an inability to talk well. Her daughter says she would start a sentence but could not complete it. She had difficulty finding her words. She was frustrated. This lasted an hour or so. She came to the ER by which time both her speech and her sensation had returned to normal.  She denies headaches dysarthria, weakness. She has chronic balance difficulties and ambulates with a walker. She has had worsening memory loss and her daughter says signs of dementia. She does take Plavix.     PAST MEDICAL HISTORY:    Medical History:      Diagnosis Date    [facility]     CAD (coronary artery disease), native coronary artery     Carotid artery stenosis 2014    [facility] ([facility]     Diabetes mellitus type II     Hyperlipidemia     Cholesterol management per pcp, Yris Mcmullen M.D.    Hypertension     S/P cardiac cath 11    Syncope 14    s/p loop recorder        Surgical History:      Procedure Laterality Date    APPENDECTOMY      CATARACT REMOVAL Left 10/2015     SECTION      CHOLECYSTECTOMY      CORONARY ARTERY BYPASS GRAFT  [id number]    LIMA to LAD (patent), SVG to intermediate branch (occluded); SVG to LAD diagonal (ocluded); SVG to RCA (subtotally occluded-stent placement )    DIAGNOSTIC CARDIAC CATH LAB PROCEDURE  [id number]    EF  60%    DIAGNOSTIC CARDIAC CATH LAB PROCEDURE  [id number]    EF 60%    DIAGNOSTIC CARDIAC CATH LAB PROCEDURE  00    EF 50%    MASTECTOMY       Right Breast    VASCULAR SURGERY  14 [facility]    Left carotid endarterectomy with vascular heart patch repair. Left cervical carotid arteriograms after endarterectomy.  VASCULAR SURGERY  14 [facility]    Right carotid endarterectomy with vascular patch repair. Right cervical carotid arteriograms after endarterectomy. Medications Prior to Admission:    Prior to Admission medications    Medication Sig Start Date End Date Taking? Authorizing Provider   doxepin (SINEQUAN) 10 MG capsule Take 10 mg by mouth nightly   Yes Historical Provider, MD   acarbose (PRECOSE) 25 MG tablet Take 25 mg by mouth 3 times daily (with meals)   Yes Historical Provider, MD   Cholecalciferol (VITAMIN D3) 5000 units TABS Take by mouth   Yes Historical Provider, MD   simvastatin (ZOCOR) 40 MG tablet  3/21/17  Yes Historical Provider, MD   lisinopril (PRINIVIL;ZESTRIL) 20 MG tablet Take 10 mg by mouth Daily  3/9/13  Yes Historical Provider, MD   clopidogrel (PLAVIX) 75 MG tablet Take 1 tablet by mouth daily. 11  Yes LELE Troy   paroxetine (PAXIL) 10 MG tablet Take 20 mg by mouth every morning. Yes Historical Provider, MD   isosorbide mononitrate (IMDUR) 30 MG CR tablet Take 30 mg by mouth daily. Yes Historical Provider, MD   nitroGLYCERIN (NITROSTAT) 0.4 MG SL tablet Place 1 tablet under the tongue every 5 minutes as needed 9/3/15   CATIA Jorge MD       Current Medications:  Current fluent     Cranial Nerves     CN II   Visual fields full to confrontation. CN III, IV, VI   Pupils are equal, round, and reactive to light. Extraocular motions are normal.     CN V   Facial sensation intact. CN VII   Facial expression full, symmetric.      CN VIII   Hearing: intact    CN IX, X   Palate: symmetric    CN XI   Right sternocleidomastoid strength: normal  Left sternocleidomastoid strength: normal  Right trapezius strength: normal  Left trapezius strength: normal    CN XII   Tongue: not atrophic  Fasciculations: absent  Tongue deviation: none    Motor Exam   Muscle bulk: normal  Overall muscle tone: normal  Right arm pronator drift: absent  Left arm pronator drift: absent    Strength   Right neck flexion: 5/5  Left neck flexion: 5/5  Right neck extension: 5/5  Left neck extension: 5/5  Right deltoid: 5/5  Left deltoid: 5/5  Right biceps: 5/5  Left biceps: 5/5  Right triceps: 5/5  Left triceps: 5/5  Right wrist flexion: 5/5  Left wrist flexion: 5/5  Right wrist extension: 5/5  Left wrist extension: 5/5  Right interossei: 5/5  Left interossei: 5/5  Right iliopsoas: 5/5  Left iliopsoas: 5/5  Right quadriceps: 5/5  Left quadriceps: 5/5  Right glutei: 5/5  Left glutei: 5/5  Right anterior tibial: 5/5  Left anterior tibial: 5/5  Right posterior tibial: 5/5  Left posterior tibial: 5/5  Right peroneal: 5/5  Left peroneal: 5/5  Right gastroc: 5/5  Left gastroc: 5/5    Sensory Exam   Light touch normal.   Right arm vibration: normal  Left arm vibration: normal  Right leg vibration: decreased from knee  Left leg vibration: decreased from knee  Right arm proprioception: normal  Left arm proprioception: normal  Right leg proprioception: decreased from knee  Left leg proprioception: decreased from knee    Gait, Coordination, and Reflexes     Gait  Gait: wide-based    Coordination   Romberg: positive  Finger to nose coordination: normal  Heel to shin coordination: normal  Tandem walking coordination: abnormal    Tremor   Resting tremor: absent  Intention tremor: absent  Action tremor: absent    Reflexes   Right brachioradialis: 1+  Left brachioradialis: 1+  Right biceps: 1+  Left biceps: 1+  Right triceps: 1+  Left triceps: 1+  Right patellar: 1+  Left patellar: 1+  Right achilles: 0  Left achilles: 0  Right plantar: normal  Left plantar: normal  Right Cochran: absent  Left Cochran: absent  Right ankle clonus: absent  Left ankle clonus: absentRapid alternating movements are intact       ADDITIONAL REVIEW:  CBC:    Recent Labs      12/30/17   1703  12/31/17   0312   WBC  6.0   --    HGB  13.6   --    PLT  180  143     BMP:     Recent Labs      12/30/17 1703   NA  138   K  4.2   CL  97*   CO2  27   BUN  11   CREATININE  1.4*   GLUCOSE  243*     Hepatic:    Recent Labs      12/30/17 1703   AST  15   ALT  9   BILITOT  0.8   ALKPHOS  70     Lipids:    Recent Labs      12/31/17 0312   CHOL  158*   HDL  31*     INR:    Recent Labs      12/30/17 1703   INR  0.93     Narrative   CT HEAD WO CONTRAST 12/30/2017 3:30 PM   History: TIA   Comparisons: 7/31/2017    Technique:   Radiation dose equals  mGy cm. AUTOMATED EXPOSURE CONTROL dose   reduction technique was implemented   CT evaluation of the head without intravenous contrast. 5 mm   transaxial images were obtained.   2-D sagittal and coronal   reconstruction images were generated. Findings: There is no intra or extra-axial hemorrhage. There is no mass effect or midline shift. There is no hydrocephalus. There is central and cortical atrophy. Paranasal sinuses imaged in part are grossly clear. Bone window images reveals no acute calvarial abnormality. Old left thalamic lacunar type infarct less apparent on today's study. The prior examination.             Impression   Impression:    1.  No CT evidence of an acute intracranial process.                      Signed by Dr Chanel Dsouza on 12/30/2017 5:28 PM     Narrative   Vascular Carotid

## 2018-01-01 VITALS
OXYGEN SATURATION: 93 % | RESPIRATION RATE: 16 BRPM | DIASTOLIC BLOOD PRESSURE: 52 MMHG | WEIGHT: 141 LBS | HEART RATE: 84 BPM | BODY MASS INDEX: 23.49 KG/M2 | SYSTOLIC BLOOD PRESSURE: 106 MMHG | TEMPERATURE: 98.4 F | HEIGHT: 65 IN

## 2018-01-01 LAB
FOLATE: 9.1 NG/ML (ref 4.8–37.3)
GLUCOSE BLD-MCNC: 212 MG/DL (ref 70–99)
GLUCOSE BLD-MCNC: 260 MG/DL (ref 70–99)
GLUCOSE BLD-MCNC: 265 MG/DL (ref 70–99)
PERFORMED ON: ABNORMAL
TSH SERPL DL<=0.05 MIU/L-ACNC: 4.19 UIU/ML (ref 0.27–4.2)
VITAMIN B-12: 252 PG/ML (ref 211–946)

## 2018-01-01 PROCEDURE — G0378 HOSPITAL OBSERVATION PER HR: HCPCS

## 2018-01-01 PROCEDURE — 82948 REAGENT STRIP/BLOOD GLUCOSE: CPT

## 2018-01-01 PROCEDURE — 82607 VITAMIN B-12: CPT

## 2018-01-01 PROCEDURE — 96374 THER/PROPH/DIAG INJ IV PUSH: CPT

## 2018-01-01 PROCEDURE — 6360000002 HC RX W HCPCS: Performed by: FAMILY MEDICINE

## 2018-01-01 PROCEDURE — 84443 ASSAY THYROID STIM HORMONE: CPT

## 2018-01-01 PROCEDURE — 99213 OFFICE O/P EST LOW 20 MIN: CPT | Performed by: PSYCHIATRY & NEUROLOGY

## 2018-01-01 PROCEDURE — 82746 ASSAY OF FOLIC ACID SERUM: CPT

## 2018-01-01 PROCEDURE — 6370000000 HC RX 637 (ALT 250 FOR IP): Performed by: FAMILY MEDICINE

## 2018-01-01 PROCEDURE — 36415 COLL VENOUS BLD VENIPUNCTURE: CPT

## 2018-01-01 PROCEDURE — 2580000003 HC RX 258: Performed by: FAMILY MEDICINE

## 2018-01-01 RX ORDER — ASPIRIN 81 MG/1
81 TABLET ORAL DAILY
Qty: 30 TABLET | Refills: 3 | COMMUNITY
Start: 2018-01-01

## 2018-01-01 RX ORDER — ACARBOSE 50 MG/1
50 TABLET ORAL
Status: DISCONTINUED | OUTPATIENT
Start: 2018-01-01 | End: 2018-01-01

## 2018-01-01 RX ORDER — ONDANSETRON 2 MG/ML
4 INJECTION INTRAMUSCULAR; INTRAVENOUS EVERY 4 HOURS PRN
Status: DISCONTINUED | OUTPATIENT
Start: 2018-01-01 | End: 2018-01-01 | Stop reason: HOSPADM

## 2018-01-01 RX ADMIN — ACARBOSE 25 MG: 25 TABLET ORAL at 09:16

## 2018-01-01 RX ADMIN — LINAGLIPTIN 5 MG: 5 TABLET, FILM COATED ORAL at 10:50

## 2018-01-01 RX ADMIN — Medication 10 ML: at 09:20

## 2018-01-01 RX ADMIN — INSULIN LISPRO 6 UNITS: 100 INJECTION, SOLUTION INTRAVENOUS; SUBCUTANEOUS at 17:06

## 2018-01-01 RX ADMIN — ONDANSETRON 4 MG: 2 INJECTION INTRAMUSCULAR; INTRAVENOUS at 11:52

## 2018-01-01 RX ADMIN — INSULIN LISPRO 4 UNITS: 100 INJECTION, SOLUTION INTRAVENOUS; SUBCUTANEOUS at 08:11

## 2018-01-01 RX ADMIN — CLOPIDOGREL BISULFATE 75 MG: 75 TABLET ORAL at 09:16

## 2018-01-01 RX ADMIN — CHOLECALCIFEROL CAP 125 MCG (5000 UNIT) 5000 UNITS: 125 CAP at 09:16

## 2018-01-01 RX ADMIN — PAROXETINE 20 MG: 20 TABLET, FILM COATED ORAL at 09:17

## 2018-01-01 RX ADMIN — LISINOPRIL 10 MG: 10 TABLET ORAL at 09:17

## 2018-01-01 RX ADMIN — INSULIN LISPRO 6 UNITS: 100 INJECTION, SOLUTION INTRAVENOUS; SUBCUTANEOUS at 13:16

## 2018-01-01 RX ADMIN — ISOSORBIDE MONONITRATE 30 MG: 30 TABLET, EXTENDED RELEASE ORAL at 09:17

## 2018-01-01 NOTE — PROGRESS NOTES
[address], [address]  [place], [place]  Phone [phone]     Neurology Progress Note  2018 8:39 AM  Information:   Patient Name: Wendie Hodge  :   1940  Age:   76 y.o. MRN:   [id number]  Account #:  [de-identified]  Admit Date:   2017  Today:  18     ADMIT DX:   TIA    Subjective:     Wendie Hodge is a 76y.o. year old woman with a history of hypertension, hyperlipidemia, diabetes, bilateral carotid endarterectomies, and cardiovascular disease who was admitted  after a TIA. Interval History:   No further events of inability to talk properly. No further numbness, tingling. She does relate that she tends to get the whole body numbness and tingling when she stands up and it is sometimes associated with dimming of her vision. Objective:     Past Medical History:  Past Medical History:   Diagnosis Date    [facility]     CAD (coronary artery disease), native coronary artery     Carotid artery stenosis 2014    [facility]     Diabetes mellitus type II     Hyperlipidemia     Cholesterol management per Margarito Arnett M.D.    Hypertension     S/P cardiac cath 11    Syncope 14    s/p loop recorder        Past Surgical History:   Procedure Laterality Date    APPENDECTOMY      CATARACT REMOVAL Left 10/2015     SECTION      CHOLECYSTECTOMY      CORONARY ARTERY BYPASS GRAFT  [id number]    LIMA to LAD (patent), SVG to intermediate branch (occluded); SVG to LAD diagonal (ocluded); SVG to RCA (subtotally occluded-stent placement )    DIAGNOSTIC CARDIAC CATH LAB PROCEDURE  [id number]    EF  60%    DIAGNOSTIC CARDIAC CATH LAB PROCEDURE  [id number]    EF 60%    DIAGNOSTIC CARDIAC CATH LAB PROCEDURE  00    EF 50%    MASTECTOMY       Right Breast    VASCULAR SURGERY  14 [facility]    Left carotid endarterectomy with vascular heart patch repair. Left cervical carotid arteriograms after endarterectomy.     VASCULAR Study:TIA (Transient Ischemic Attack), Aphasia, Known CVD   and Dizziness/Vertigo.       Risk Factors         - The patient's risk factor(s) include: diabetes mellitus, dyslipidemia,       arterial hypertension, prior MI and prior CABG.          - The patient has a former tobacco history.         - The patient's risk factor(s) include: Prior Carotid Surgery - RightPrior       Carotid Surgery - Left        Impression        Well healed bilateral carotid endarterectomy site with no recurrent    stenosis or residual plaque.    There is normal antegrade flow in the bilateral vertebral artery(ies).    Compared to the previous study dated 6-26-17 , there is no significant    change in stenosis of the bilateral internal carotid artery(ies).        Signature        ----------------------------------------------------------------    Electronically signed by Eri Castanon MD(Interpreting    physician) on 01/01/2018 08:59 AM       ECHO Complete 2D W Doppler W Color   Order: [id number]   Status:  Final result   Visible to patient:  No (Not Released) Next appt:  04/23/2018 at 10:15 AM in Cardiology Ilya Peter MD)   Details     Reading Physician Reading Date Result Priority   Homero Rodriguez MD 12/31/2017    Narrative     Transthoracic Echocardiography Report (TTE)     Demographics      Patient Name   Govind Dorado of Study           12/31/2017      MRN            [id number]         Gender                  Female      Date of Birth  1940     Room Number             [id number]      Age            77 year(s)      Height:        65 inches      Referring Physician     Rico Howard      Weight:        141 pounds     Sonographer             Sofía Tierney      BSA:           1.71 m^2       Interpreting Physician Homero Rodriguez MD      BMI:           23.46 kg/m^2     Procedure    Type of Study      TTE procedure:ECHO NO CONTRAST WITH DOP/COLR.     Study Location: Echo Lab  Technical Quality: Good visualization    Patient Status: Inpatient    Indications:TIA.     Conclusions      Summary   1. Normal bubble study   2. Normal LV systolic function (estimated LV EF 60-65%)   3. Mild mitral regurgitation   4. Mild tricuspid regurgitation with estimated RVSP of 34 mm Hg      Signature      ----------------------------------------------------------------   Electronically signed by Jessie Casillas MD(Interpreting   physician) on 12/31/2017 07:33 PM           Telemetry shows NSR    Diet:  DIET CARDIAC; Carb Control: 4 carbs/meal (approximate 1800 kcals/day)    Examination:  Vitals: BP (!) 140/67   Pulse 72   Temp 97.7 °F (36.5 °C) (Temporal)   Resp 14   Ht 5' 5\" (1.651 m)   Wt 141 lb (64 kg)   SpO2 92%   BMI 23.46 kg/m²    Intake/Output Summary (Last 24 hours) at 01/01/18 0839  Last data filed at 01/01/18 0426   Gross per 24 hour   Intake              840 ml   Output             1250 ml   Net             -410 ml     General appearance: alert, appears stated age and cooperative  Skin: Skin color, texture, turgor normal. No rashes or lesions  HEENT: Head: Normal, normocephalic, atraumatic. Neck: no adenopathy, no carotid bruit, no JVD, supple, symmetrical, trachea midline and thyroid not enlarged, symmetric, no tenderness/mass/nodules  Lungs: clear to auscultation bilaterally  Heart: regular rate and rhythm, S1, S2 normal, no murmur, click, rub or gallop  Abdomen: soft, non-tender; bowel sounds normal; no masses,  no organomegaly  Extremities: extremities normal, atraumatic, no cyanosis or edema  Neurologic:  Alert, oriented. No dysarthria. Speech is fluent. She can name items and repeat phrases. EOMI, PERRL, VFF. No facial weakness. Limb strength is normal.  No pronator drift. Deep tendon reflexes trace present in the UEs and knees and absent at the ankles. Toes are downgoing. Rapid alternating movements are normal.  Finger to nose testing shows no dysmetria. Assessment:   1. TIA. She has multiple risk factors.   She is

## 2018-01-01 NOTE — DISCHARGE SUMMARY
MARISELADARIO JAIME [facility] [address], [facility]                                 DISCHARGE SUMMARY    PATIENT NAME: Weyman Klinefelter                      :        1940  MED REC NO:   [id number]                              ROOM:       [facility]  ACCOUNT NO:   [de-identified]                           ADMIT DATE: 2017  PROVIDER:     Óscar Merino MD                   [address] [place] [address] DATE: 2018      ADMIT DATE:  2017    DISCHARGE DATE:  2018    DIAGNOSES:  1. TIA with a prior history of CVA. 2.  Diabetes, under poor control. 3.  Memory loss, suspected dementia. 4.  Hypertension. CONSULTATION:  She was seen in consultation by Dr. Lennox Kub. HOSPITAL COURSE:  She has had complete resolution of symptoms. She has  been started on a baby aspirin. I will start her on some Januvia for help  management of her blood sugar. She is taking a single dose of insulin at  home as well. Her daughter states that she sometimes forgets to take her  medicines due to the memory loss. The patient does not remember taking her  medicines and they do try to help her with pill containers to keep her on  track, they will continue to do that. I will see her in the office in a  week. Dr. Lennox Kub is going to follow up with her as well.           Ivet Higuera MD    D: 2018 11:16:22       T: 2018 11:47:34     [id number]  Job#: [id number]     Doc#: [id number]    CC:

## 2018-01-01 NOTE — PROGRESS NOTES
has added asa 81mg. 2. HTN: Stable  3. DM: She will be discharged on januvia. 4. Memory loss, suspected dementia: Per Dr mabel Davis. Advance Directive: Full Code  DVT prophylaxis with enoxaparin 40 mg sub-Q daily.    Discharge planning: Home    Active Problems:    TIA (transient ischemic attack)    Diabetes mellitus ([facility] [address]    Essential hypertension    Late onset Alzheimer's disease without behavioral disturbance    Stage 3 chronic kidney disease      Janelle Glasgow MD

## 2018-01-02 ENCOUNTER — TELEPHONE (OUTPATIENT)
Dept: NEUROSURGERY | Age: 78
End: 2018-01-02

## 2018-01-02 LAB
EKG P AXIS: 71 DEGREES
EKG P-R INTERVAL: 112 MS
EKG Q-T INTERVAL: 418 MS
EKG QRS DURATION: 88 MS
EKG QTC CALCULATION (BAZETT): 425 MS
EKG T AXIS: 104 DEGREES
URINE CULTURE, ROUTINE: NORMAL

## 2018-01-25 ENCOUNTER — OFFICE VISIT (OUTPATIENT)
Dept: NEUROLOGY | Age: 78
End: 2018-01-25
Payer: MEDICARE

## 2018-01-25 VITALS — BODY MASS INDEX: 23.32 KG/M2 | HEIGHT: 65 IN | WEIGHT: 140 LBS

## 2018-01-25 DIAGNOSIS — R55 SYNCOPE AND COLLAPSE: ICD-10-CM

## 2018-01-25 DIAGNOSIS — G30.1 LATE ONSET ALZHEIMER'S DISEASE WITHOUT BEHAVIORAL DISTURBANCE (HCC): ICD-10-CM

## 2018-01-25 DIAGNOSIS — G45.1 HEMISPHERIC CAROTID ARTERY SYNDROME: Primary | ICD-10-CM

## 2018-01-25 DIAGNOSIS — F02.80 LATE ONSET ALZHEIMER'S DISEASE WITHOUT BEHAVIORAL DISTURBANCE (HCC): ICD-10-CM

## 2018-01-25 DIAGNOSIS — R20.1 HYPOESTHESIA OF SKIN: ICD-10-CM

## 2018-01-25 PROCEDURE — G8420 CALC BMI NORM PARAMETERS: HCPCS | Performed by: PSYCHIATRY & NEUROLOGY

## 2018-01-25 PROCEDURE — 4040F PNEUMOC VAC/ADMIN/RCVD: CPT | Performed by: PSYCHIATRY & NEUROLOGY

## 2018-01-25 PROCEDURE — G8484 FLU IMMUNIZE NO ADMIN: HCPCS | Performed by: PSYCHIATRY & NEUROLOGY

## 2018-01-25 PROCEDURE — 1123F ACP DISCUSS/DSCN MKR DOCD: CPT | Performed by: PSYCHIATRY & NEUROLOGY

## 2018-01-25 PROCEDURE — G8399 PT W/DXA RESULTS DOCUMENT: HCPCS | Performed by: PSYCHIATRY & NEUROLOGY

## 2018-01-25 PROCEDURE — G8598 ASA/ANTIPLAT THER USED: HCPCS | Performed by: PSYCHIATRY & NEUROLOGY

## 2018-01-25 PROCEDURE — 1036F TOBACCO NON-USER: CPT | Performed by: PSYCHIATRY & NEUROLOGY

## 2018-01-25 PROCEDURE — 1090F PRES/ABSN URINE INCON ASSESS: CPT | Performed by: PSYCHIATRY & NEUROLOGY

## 2018-01-25 PROCEDURE — 99214 OFFICE O/P EST MOD 30 MIN: CPT | Performed by: PSYCHIATRY & NEUROLOGY

## 2018-01-25 PROCEDURE — G8427 DOCREV CUR MEDS BY ELIG CLIN: HCPCS | Performed by: PSYCHIATRY & NEUROLOGY

## 2018-01-25 RX ORDER — DONEPEZIL HYDROCHLORIDE 5 MG/1
5 TABLET, FILM COATED ORAL NIGHTLY
Qty: 30 TABLET | Refills: 5 | Status: SHIPPED | OUTPATIENT
Start: 2018-01-25 | End: 2018-04-26 | Stop reason: ALTCHOICE

## 2018-01-25 RX ORDER — PAROXETINE HYDROCHLORIDE 20 MG/1
20 TABLET, FILM COATED ORAL DAILY
Qty: 30 TABLET | Refills: 5 | Status: SHIPPED | OUTPATIENT
Start: 2018-01-25 | End: 2018-04-26 | Stop reason: ALTCHOICE

## 2018-01-25 NOTE — PATIENT INSTRUCTIONS
INSTRUCTIONS:  1. Increase the Paxil to 20 mg daily as Dr. Vincenzo Melendez had previously advised  2.  Start donepezil 5 mg at bedtime

## 2018-01-25 NOTE — PROGRESS NOTES
fields are full to confrontation. Facial movements are symmetrical and normal.  Speech is precise. Extremity strength is normal in both uppers and lowers. Deep tendon reflexes are intact and symmetrical.  Rapid alternating movements are unimpaired. Finger-to-nose testing is performed well, without dysmetria. Gait is normal.    [facility] Mental Status Exam    1. Orientation (8)  Name, address, current location (building), city, state, date (day), month, year:       8  2. Attention (7)  Digit span:  2-5-1-0;  6-1-3-7-2;  2-8-9-6-3-4;  0-6-5-1-3-0-6:       6  3. Immediate Recall (4)  \"Mr. Herrmann\";  \"Apple\"; \"Tunnel\"; \"Jeanna\":       4  4. Information (4) President; first president; # of weeks per year; define an island:       3  5. Calculation (4) 12 + 13 (25), 32-8 (24), 39/3 (13), 13 X 5 (65):       1  6. Abstraction (3) Similarities:  orange/banana, horse/dog, table/bookcase:                      3  7. Recall (4) The four words: \"Mr. Herrmann\";  \"Apple\"; \"Tunnel\"; \"Jeanna\":       3         Total Score:   28/34   (max = 34)          Pertinent Diagnostic Studies:  Hospital studies and notes    Assessment:       ICD-10-CM ICD-9-CM    1. 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 Syncope and collapse R55 780.2    3. Late onset Alzheimer's disease without behavioral disturbance G30.1 331.0     F02.80 294.10    4. Hypoesthesia of skin R20.1 782. 0    She has a progressive degenerative dementia with depression, irritability. I discussed the diagnosis, pathophysiology, symptoms, risks, prognosis, and treatment options of Alzheimer's disease with Ev Hernandez and her son. Plan:   1. Increase the Paxil to 20 mg daily as Dr. Mel Ledesma had previously advised  2. Start donepezil 5 mg at bedtime  3. I advised her not to drive and told her of the [place] state law regarding unexplained and untreated loss of consciousness and driving  4. FU in 4 months.     Electronically signed by Sarah Herron MD on

## 2018-01-29 ENCOUNTER — HOSPITAL ENCOUNTER (EMERGENCY)
Age: 78
Discharge: HOME | End: 2018-01-29
Payer: MEDICARE

## 2018-01-29 VITALS
OXYGEN SATURATION: 93 % | RESPIRATION RATE: 16 BRPM | DIASTOLIC BLOOD PRESSURE: 62 MMHG | HEIGHT: 65 IN | BODY MASS INDEX: 22.49 KG/M2 | TEMPERATURE: 98.2 F | HEART RATE: 89 BPM | WEIGHT: 135 LBS | SYSTOLIC BLOOD PRESSURE: 164 MMHG

## 2018-01-29 DIAGNOSIS — I10 UNCONTROLLED HYPERTENSION: Primary | ICD-10-CM

## 2018-01-29 LAB
ALBUMIN SERPL-MCNC: 4.1 G/DL (ref 3.5–5.2)
ALP BLD-CCNC: 78 U/L (ref 35–104)
ALT SERPL-CCNC: 12 U/L (ref 5–33)
ANION GAP SERPL CALCULATED.3IONS-SCNC: 15 MMOL/L (ref 7–19)
AST SERPL-CCNC: 17 U/L (ref 5–32)
BASOPHILS ABSOLUTE: 0.1 K/UL (ref 0–0.2)
BASOPHILS RELATIVE PERCENT: 1.1 % (ref 0–1)
BILIRUB SERPL-MCNC: 0.6 MG/DL (ref 0.2–1.2)
BUN BLDV-MCNC: 10 MG/DL (ref 8–23)
CALCIUM SERPL-MCNC: 9.5 MG/DL (ref 8.8–10.2)
CHLORIDE BLD-SCNC: 94 MMOL/L (ref 98–111)
CO2: 26 MMOL/L (ref 22–29)
CREAT SERPL-MCNC: 0.9 MG/DL (ref 0.5–0.9)
EOSINOPHILS ABSOLUTE: 0.1 K/UL (ref 0–0.6)
EOSINOPHILS RELATIVE PERCENT: 0.8 % (ref 0–5)
GFR NON-AFRICAN AMERICAN: >60
GLUCOSE BLD-MCNC: 296 MG/DL (ref 74–109)
HCT VFR BLD CALC: 39 % (ref 37–47)
HEMOGLOBIN: 13.1 G/DL (ref 12–16)
LYMPHOCYTES ABSOLUTE: 1 K/UL (ref 1.1–4.5)
LYMPHOCYTES RELATIVE PERCENT: 16.4 % (ref 20–40)
MCH RBC QN AUTO: 29 PG (ref 27–31)
MCHC RBC AUTO-ENTMCNC: 33.6 G/DL (ref 33–37)
MCV RBC AUTO: 86.3 FL (ref 81–99)
MONOCYTES ABSOLUTE: 0.4 K/UL (ref 0–0.9)
MONOCYTES RELATIVE PERCENT: 6.7 % (ref 0–10)
NEUTROPHILS ABSOLUTE: 4.7 K/UL (ref 1.5–7.5)
NEUTROPHILS RELATIVE PERCENT: 74.8 % (ref 50–65)
PDW BLD-RTO: 12.5 % (ref 11.5–14.5)
PLATELET # BLD: 190 K/UL (ref 130–400)
PMV BLD AUTO: 10.2 FL (ref 9.4–12.3)
POTASSIUM SERPL-SCNC: 4 MMOL/L (ref 3.5–5)
RBC # BLD: 4.52 M/UL (ref 4.2–5.4)
SODIUM BLD-SCNC: 135 MMOL/L (ref 136–145)
TOTAL PROTEIN: 7.4 G/DL (ref 6.6–8.7)
WBC # BLD: 6.3 K/UL (ref 4.8–10.8)

## 2018-01-29 PROCEDURE — 99283 EMERGENCY DEPT VISIT LOW MDM: CPT

## 2018-01-29 PROCEDURE — 36415 COLL VENOUS BLD VENIPUNCTURE: CPT

## 2018-01-29 PROCEDURE — 80053 COMPREHEN METABOLIC PANEL: CPT

## 2018-01-29 PROCEDURE — 85025 COMPLETE CBC W/AUTO DIFF WBC: CPT

## 2018-01-29 PROCEDURE — 93005 ELECTROCARDIOGRAM TRACING: CPT

## 2018-01-29 PROCEDURE — 99283 EMERGENCY DEPT VISIT LOW MDM: CPT | Performed by: NURSE PRACTITIONER

## 2018-01-29 PROCEDURE — 6370000000 HC RX 637 (ALT 250 FOR IP): Performed by: NURSE PRACTITIONER

## 2018-01-29 RX ORDER — LORAZEPAM 1 MG/1
1 TABLET ORAL ONCE
Status: COMPLETED | OUTPATIENT
Start: 2018-01-29 | End: 2018-01-29

## 2018-01-29 RX ORDER — CLONIDINE HYDROCHLORIDE 0.1 MG/1
0.1 TABLET ORAL ONCE
Status: COMPLETED | OUTPATIENT
Start: 2018-01-29 | End: 2018-01-29

## 2018-01-29 RX ORDER — METOPROLOL SUCCINATE 25 MG/1
25 TABLET, EXTENDED RELEASE ORAL DAILY
COMMUNITY
End: 2018-07-19 | Stop reason: DRUGHIGH

## 2018-01-29 RX ADMIN — LORAZEPAM 1 MG: 1 TABLET ORAL at 19:34

## 2018-01-29 RX ADMIN — CLONIDINE HYDROCHLORIDE 0.1 MG: 0.1 TABLET ORAL at 19:34

## 2018-01-29 ASSESSMENT — ENCOUNTER SYMPTOMS
DIARRHEA: 0
COUGH: 0
TROUBLE SWALLOWING: 0
CONSTIPATION: 0
SINUS PRESSURE: 0
NAUSEA: 0
RHINORRHEA: 0
SORE THROAT: 0
VOMITING: 0
SHORTNESS OF BREATH: 0
ABDOMINAL PAIN: 0

## 2018-01-30 LAB
EKG P AXIS: 75 DEGREES
EKG P-R INTERVAL: 114 MS
EKG Q-T INTERVAL: 368 MS
EKG QRS DURATION: 90 MS
EKG QTC CALCULATION (BAZETT): 438 MS
EKG T AXIS: 157 DEGREES

## 2018-01-30 NOTE — ED PROVIDER NOTES
Abnormal; Notable for the following:     Sodium 135 (*)     Chloride 94 (*)     Glucose 296 (*)     All other components within normal limits       All other labs were within normal range or not returned as of this dictation. EMERGENCY DEPARTMENT COURSE and DIFFERENTIAL DIAGNOSIS/MDM:   Vitals:    Vitals:    01/29/18 1820 01/29/18 1911 01/29/18 1933 01/29/18 2001   BP: (!) 228/105 (!) 191/82 (!) 173/67 (!) 177/65   Pulse: 114 101 99 97   Resp: 18 18 17 16   Temp: 98.5 °F (36.9 °C)      TempSrc: Temporal      SpO2: 94% 93% 94% 93%   Weight: 135 lb (61.2 kg)      Height: 5' 5\" (1.651 m)          MDM  I discussed this case with Dr. Rosi Rosario. Patient's labs are normal. Her blood pressures come down to 164/62. She is feeling much better. She will be discharged home to follow up with her primary care provider. She was advised to take her blood pressure medicines as prescribed today and monitor her blood pressures. Her family is agreeable with this plan. She is stable at discharge to return if any problems or worse    CONSULTS:  None    PROCEDURES:  Unless otherwise noted below, none     Procedures    FINAL IMPRESSION      1.  Uncontrolled hypertension          DISPOSITION/PLAN   DISPOSITION     PATIENT [facility]:  [facility] [address]  [place] [id number]            DISCHARGE MEDICATIONS:  New Prescriptions    No medications on file          (Please note that portions of this note were completed with a voice recognition program.  Efforts were made to edit the dictations but occasionally words are mis-transcribed.)    LELE Wakefield (electronically signed)  Attending Emergency Physician           LELE Wakefield  01/29/18 2038

## 2018-01-30 NOTE — ED NOTES
ASSESSMENT:    PT ALERT/ORIENTED X4. PUPILS EQUAL/REACTIVE    SKIN:  WARM/DRY PINK CAPILLARY REFILL < 2SECS    CARDIAC:  S1 S2 NOTED. Patient placed on cardiac monitor, continuous pulse oximeter, and NIBP monitor. Monitor alarms on. PT ARRIVED TO ED WITH C/O HYPERTENSION. PT ALSO C/O INCREASED NERVES. LUNGS: CLEAR UPPER AND LOWER LOBES, RESPIRATIONS EVEN/UNLABORED     ABDOMEN: BOWEL SOUNDS NOTED UPPER AND LOWER QUADRANTS                     SOFT AND NONTENDER. EXTREMITIES:  BILATERAL DP AND PT AND NO EDEMA NOTED. NO DISTRESS NOTED. SIDE RAILS UP AND CALL LIGHT IN REACH.      Ant Naranjo RN  01/29/18 2015

## 2018-01-30 NOTE — ED NOTES
Pt is resting comfortably in bed. Pt shows no s/s of distress. Call light is in reach of pt and pt encouraged to call if he needs anything.       Nathan Fry RN  01/29/18 2014

## 2018-02-01 ENCOUNTER — TELEPHONE (OUTPATIENT)
Dept: NEUROLOGY | Age: 78
End: 2018-02-01

## 2018-03-02 ENCOUNTER — HOSPITAL ENCOUNTER (OUTPATIENT)
Dept: GENERAL RADIOLOGY | Age: 78
Discharge: HOME | End: 2018-03-02
Payer: MEDICARE
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
## 2018-03-02 PROCEDURE — 72100 X-RAY EXAM L-S SPINE 2/3 VWS: CPT

## 2018-03-02 PROCEDURE — 73610 X-RAY EXAM OF ANKLE: CPT

## 2018-03-02 PROCEDURE — 72170 X-RAY EXAM OF PELVIS: CPT

## 2018-04-26 ENCOUNTER — OFFICE VISIT (OUTPATIENT)
Dept: CARDIOLOGY | Age: 78
End: 2018-04-26
Payer: MEDICARE

## 2018-04-26 VITALS
BODY MASS INDEX: 23.16 KG/M2 | HEIGHT: 65 IN | SYSTOLIC BLOOD PRESSURE: 138 MMHG | HEART RATE: 70 BPM | DIASTOLIC BLOOD PRESSURE: 80 MMHG | WEIGHT: 139 LBS

## 2018-04-26 DIAGNOSIS — G30.1 LATE ONSET ALZHEIMER'S DISEASE WITHOUT BEHAVIORAL DISTURBANCE (HCC): ICD-10-CM

## 2018-04-26 DIAGNOSIS — F02.80 LATE ONSET ALZHEIMER'S DISEASE WITHOUT BEHAVIORAL DISTURBANCE (HCC): ICD-10-CM

## 2018-04-26 DIAGNOSIS — E78.2 MIXED HYPERLIPIDEMIA: ICD-10-CM

## 2018-04-26 DIAGNOSIS — I10 ESSENTIAL HYPERTENSION: ICD-10-CM

## 2018-04-26 DIAGNOSIS — I25.10 ATHEROSCLEROSIS OF NATIVE CORONARY ARTERY OF NATIVE HEART WITHOUT ANGINA PECTORIS: Primary | ICD-10-CM

## 2018-04-26 PROCEDURE — 1123F ACP DISCUSS/DSCN MKR DOCD: CPT | Performed by: CLINICAL NURSE SPECIALIST

## 2018-04-26 PROCEDURE — 1090F PRES/ABSN URINE INCON ASSESS: CPT | Performed by: CLINICAL NURSE SPECIALIST

## 2018-04-26 PROCEDURE — G8399 PT W/DXA RESULTS DOCUMENT: HCPCS | Performed by: CLINICAL NURSE SPECIALIST

## 2018-04-26 PROCEDURE — G8427 DOCREV CUR MEDS BY ELIG CLIN: HCPCS | Performed by: CLINICAL NURSE SPECIALIST

## 2018-04-26 PROCEDURE — 1036F TOBACCO NON-USER: CPT | Performed by: CLINICAL NURSE SPECIALIST

## 2018-04-26 PROCEDURE — 4040F PNEUMOC VAC/ADMIN/RCVD: CPT | Performed by: CLINICAL NURSE SPECIALIST

## 2018-04-26 PROCEDURE — 99213 OFFICE O/P EST LOW 20 MIN: CPT | Performed by: CLINICAL NURSE SPECIALIST

## 2018-04-26 PROCEDURE — G8598 ASA/ANTIPLAT THER USED: HCPCS | Performed by: CLINICAL NURSE SPECIALIST

## 2018-04-26 PROCEDURE — G8420 CALC BMI NORM PARAMETERS: HCPCS | Performed by: CLINICAL NURSE SPECIALIST

## 2018-04-26 RX ORDER — ESCITALOPRAM OXALATE 10 MG/1
20 TABLET ORAL DAILY
COMMUNITY
End: 2018-12-19 | Stop reason: DRUGHIGH

## 2018-04-26 ASSESSMENT — ENCOUNTER SYMPTOMS
ORTHOPNEA: 0
HEARTBURN: 0
BLURRED VISION: 0
BLOOD IN STOOL: 0
NAUSEA: 0
VOMITING: 0
SHORTNESS OF BREATH: 1
COUGH: 0
BACK PAIN: 1

## 2018-07-04 ENCOUNTER — APPOINTMENT (OUTPATIENT)
Dept: GENERAL RADIOLOGY | Age: 78
End: 2018-07-04
Payer: MEDICARE

## 2018-07-04 ENCOUNTER — HOSPITAL ENCOUNTER (EMERGENCY)
Age: 78
Discharge: HOME | End: 2018-07-04
Payer: MEDICARE

## 2018-07-04 VITALS
BODY MASS INDEX: 23.32 KG/M2 | SYSTOLIC BLOOD PRESSURE: 132 MMHG | HEART RATE: 78 BPM | HEIGHT: 65 IN | RESPIRATION RATE: 20 BRPM | OXYGEN SATURATION: 99 % | DIASTOLIC BLOOD PRESSURE: 78 MMHG | TEMPERATURE: 98 F | WEIGHT: 140 LBS

## 2018-07-04 DIAGNOSIS — S93.401A SPRAIN OF RIGHT ANKLE, UNSPECIFIED LIGAMENT, INITIAL ENCOUNTER: Primary | ICD-10-CM
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
## 2018-07-04 PROCEDURE — 99283 EMERGENCY DEPT VISIT LOW MDM: CPT

## 2018-07-04 PROCEDURE — 73610 X-RAY EXAM OF ANKLE: CPT

## 2018-07-04 PROCEDURE — 73630 X-RAY EXAM OF FOOT: CPT

## 2018-07-04 PROCEDURE — 99283 EMERGENCY DEPT VISIT LOW MDM: CPT | Performed by: PHYSICIAN ASSISTANT

## 2018-07-04 ASSESSMENT — ENCOUNTER SYMPTOMS
ABDOMINAL PAIN: 0
RHINORRHEA: 0
COUGH: 0
CHEST TIGHTNESS: 0
APNEA: 0
SORE THROAT: 0
DIARRHEA: 0
SHORTNESS OF BREATH: 0
SINUS PRESSURE: 0
CONSTIPATION: 0
ABDOMINAL DISTENTION: 0
EYE PAIN: 0
WHEEZING: 0
VOMITING: 0
NAUSEA: 0

## 2018-07-04 ASSESSMENT — PAIN DESCRIPTION - ORIENTATION: ORIENTATION: RIGHT

## 2018-07-04 ASSESSMENT — PAIN SCALES - GENERAL: PAINLEVEL_OUTOF10: 8

## 2018-07-04 ASSESSMENT — PAIN DESCRIPTION - LOCATION: LOCATION: ANKLE;FOOT

## 2018-07-05 NOTE — ED PROVIDER NOTES
eMERGENCY dEPARTMENT eNCOUnter      Pt Name: May Golden  MRN: [id number]  [place] 1940  Date of evaluation: 7/4/2018  Provider: Matthews Alpers, [facility]       Chief Complaint   Patient presents with    Ankle Pain     c/o right ankle pain after taking care of dog    Foot Pain         HISTORY OF PRESENT ILLNESS  (Location/Symptom, Timing/Onset, Context/Setting, Quality, Duration, Modifying Factors, Severity.)   May Golden is a [de-identified] y.o. female who presents to the emergency department With pain in her right ankle and foot. Patient states she actually tripped and rolled her foot after letting her dog out. Denies losing consciousness or hitting her head. She's had trouble bearing weight. She has not taken anything for her pain. Nursing Notes were reviewed and I agree. REVIEW OF SYSTEMS    (2-9 systems for level 4, 10 or more for level 5)     Review of Systems   Constitutional: Negative for activity change, chills, fatigue and fever. HENT: Negative for ear pain, hearing loss, postnasal drip, rhinorrhea, sinus pressure and sore throat. Eyes: Negative for pain and visual disturbance. Respiratory: Negative for apnea, cough, chest tightness, shortness of breath and wheezing. Cardiovascular: Negative for chest pain. Gastrointestinal: Negative for abdominal distention, abdominal pain, constipation, diarrhea, nausea and vomiting. Genitourinary: Negative for difficulty urinating, dysuria, flank pain and hematuria. Musculoskeletal: Positive for arthralgias. Negative for joint swelling. Skin: Negative for rash. Neurological: Negative for dizziness, syncope, light-headedness and headaches. Psychiatric/Behavioral: Negative for agitation and confusion. Except as noted above the remainder of the review of systems was reviewed and negative.        PAST MEDICAL HISTORY     Past Medical History:   Diagnosis Date    Breast cancer ([facility] [address]     CAD (coronary artery disease), (PRINIVIL;ZESTRIL) 20 MG tablet Take 10 mg by mouth Daily       isosorbide mononitrate (IMDUR) 30 MG CR tablet Take 30 mg by mouth daily. SITagliptin (JANUVIA) 50 MG tablet Take 0.5 tablets by mouth daily, Disp-30 tablet, R-3Normal      nitroGLYCERIN (NITROSTAT) 0.4 MG SL tablet Place 1 tablet under the tongue every 5 minutes as needed, Disp-25 tablet, R-3             ALLERGIES     Tetanus toxoids    FAMILY HISTORY       Family History   Problem Relation Age of Onset    Coronary Art Dis Mother     Diabetes Mother     Hypertension Mother     Cancer Mother     Cancer Sister     Coronary Art Dis Brother     Cancer Brother     Coronary Art Dis Maternal Grandmother     Coronary Art Dis Maternal Grandfather     Diabetes Daughter           SOCIAL HISTORY       Social History     Social History    Marital status:      Spouse name: N/A    Number of children: N/A    Years of education: N/A     Social History Main Topics    Smoking status: Former Smoker    Smokeless tobacco: Never Used      Comment: quit around 1976.  Alcohol use No    Drug use: No    Sexual activity: Not Asked     Other Topics Concern    None     Social History Narrative    None       SCREENINGS           PHYSICAL EXAM    (up to 7 for level 4, 8 or more for level 5)     ED Triage Vitals   BP Temp Temp src Pulse Resp SpO2 Height Weight   07/04/18 2157 07/04/18 2157 -- 07/04/18 2157 07/04/18 2157 07/04/18 2157 07/04/18 2152 07/04/18 2152   132/78 98 °F (36.7 °C)  78 20 99 % 5' 5\" (1.651 m) 140 lb (63.5 kg)       Physical Exam   Constitutional: She is oriented to person, place, and time. She appears well-developed and well-nourished. No distress. HENT:   Head: Normocephalic and atraumatic. Cardiovascular: Normal rate, regular rhythm and normal heart sounds. Exam reveals no gallop and no friction rub. No murmur heard. Pulmonary/Chest: Effort normal and breath sounds normal. No respiratory distress. She has no wheezes. She has no rales. Abdominal: Soft. Bowel sounds are normal. She exhibits no distension. There is no tenderness. There is no rebound and no guarding. Musculoskeletal:        Feet:    Neurological: She is alert and oriented to person, place, and time. Skin: Skin is warm and dry. No rash noted. She is not diaphoretic. Psychiatric: She has a normal mood and affect. Nursing note and vitals reviewed. DIAGNOSTIC RESULTS     RADIOLOGY:   Non-plain film images such as CT, Ultrasound and MRI are read by the radiologist.   Interpretation per the Radiologist below, if available at the time of this note:    XR FOOT RIGHT (MIN 3 VIEWS)    (Results Pending)   XR ANKLE RIGHT (MIN 3 VIEWS)    (Results Pending)       LABS:  Labs Reviewed - No data to display    All other labs were within normal range or not returned as of this dictation. EMERGENCY DEPARTMENT COURSE and DIFFERENTIAL DIAGNOSIS/MDM:   Vitals:    Vitals:    07/04/18 2152 07/04/18 2157   BP:  132/78   Pulse:  78   Resp:  20   Temp:  98 °F (36.7 °C)   SpO2:  99%   Weight: 140 lb (63.5 kg)    Height: 5' 5\" (1.651 m)            MDM  Number of Diagnoses or Management Options  Fall, initial encounter:   Sprain of right ankle, unspecified ligament, initial encounter:   Diagnosis management comments: Xrays interpreted by Dr. Marialuisa Brand, no evidence of fracture. Patient will use a walker at home for ambulation. I advised her rest ice compression and elevation for her ankle sprain. She was placed in an Aircast for support and comfort. Follow-up with primary care doctor. Take Tylenol as needed for pain. PROCEDURES:    Procedures      FINAL IMPRESSION      1. Sprain of right ankle, unspecified ligament, initial encounter    2.  Fall, initial encounter          DISPOSITION/PLAN   DISPOSITION Decision To Discharge 07/04/2018 10:51:20 PM      Patient was told that if symptoms worsen or new symptoms develop they are to return to the emergency department

## 2018-07-17 ENCOUNTER — HOSPITAL ENCOUNTER (OUTPATIENT)
Dept: GENERAL RADIOLOGY | Age: 78
Discharge: HOME | End: 2018-07-17
Payer: MEDICARE

## 2018-07-17 DIAGNOSIS — M79.671 RIGHT FOOT PAIN: ICD-10-CM

## 2018-07-17 DIAGNOSIS — I65.23 BILATERAL CAROTID ARTERY STENOSIS: Primary | ICD-10-CM

## 2018-07-17 PROCEDURE — 73610 X-RAY EXAM OF ANKLE: CPT

## 2018-07-17 PROCEDURE — 73630 X-RAY EXAM OF FOOT: CPT

## 2018-07-19 ENCOUNTER — OFFICE VISIT (OUTPATIENT)
Dept: VASCULAR SURGERY | Age: 78
End: 2018-07-19
Payer: MEDICARE

## 2018-07-19 ENCOUNTER — HOSPITAL ENCOUNTER (OUTPATIENT)
Dept: VASCULAR LAB | Age: 78
Discharge: HOME | End: 2018-07-19
Payer: MEDICARE

## 2018-07-19 VITALS — TEMPERATURE: 97.8 F | DIASTOLIC BLOOD PRESSURE: 77 MMHG | SYSTOLIC BLOOD PRESSURE: 170 MMHG | HEART RATE: 60 BPM

## 2018-07-19 DIAGNOSIS — I65.23 BILATERAL CAROTID ARTERY STENOSIS: ICD-10-CM

## 2018-07-19 DIAGNOSIS — I65.23 BILATERAL CAROTID ARTERY STENOSIS: Primary | ICD-10-CM

## 2018-07-19 PROCEDURE — G8598 ASA/ANTIPLAT THER USED: HCPCS | Performed by: PHYSICIAN ASSISTANT

## 2018-07-19 PROCEDURE — G8427 DOCREV CUR MEDS BY ELIG CLIN: HCPCS | Performed by: PHYSICIAN ASSISTANT

## 2018-07-19 PROCEDURE — 1036F TOBACCO NON-USER: CPT | Performed by: PHYSICIAN ASSISTANT

## 2018-07-19 PROCEDURE — G8420 CALC BMI NORM PARAMETERS: HCPCS | Performed by: PHYSICIAN ASSISTANT

## 2018-07-19 PROCEDURE — 4040F PNEUMOC VAC/ADMIN/RCVD: CPT | Performed by: PHYSICIAN ASSISTANT

## 2018-07-19 PROCEDURE — 99213 OFFICE O/P EST LOW 20 MIN: CPT | Performed by: PHYSICIAN ASSISTANT

## 2018-07-19 PROCEDURE — 93880 EXTRACRANIAL BILAT STUDY: CPT

## 2018-07-19 PROCEDURE — 1090F PRES/ABSN URINE INCON ASSESS: CPT | Performed by: PHYSICIAN ASSISTANT

## 2018-07-19 PROCEDURE — 1101F PT FALLS ASSESS-DOCD LE1/YR: CPT | Performed by: PHYSICIAN ASSISTANT

## 2018-07-19 PROCEDURE — G8399 PT W/DXA RESULTS DOCUMENT: HCPCS | Performed by: PHYSICIAN ASSISTANT

## 2018-07-19 PROCEDURE — 1123F ACP DISCUSS/DSCN MKR DOCD: CPT | Performed by: PHYSICIAN ASSISTANT

## 2018-07-19 RX ORDER — METOPROLOL SUCCINATE 100 MG/1
50 TABLET, EXTENDED RELEASE ORAL DAILY
Status: ON HOLD | COMMUNITY
Start: 2018-05-16 | End: 2018-10-09 | Stop reason: HOSPADM

## 2018-07-19 NOTE — PROGRESS NOTES
Patient Care Team:  Radha Mckinney MD as PCP - General (Family Medicine)  CATIA Fair MD as Consulting Physician (Cardiology)  Kiara Gorman MD as Consulting Physician (Cardiology)  Stephanie Brown MD as Consulting Physician (Neurology)      Subjective    Mrs. Evert Machado presents for follow-up of carotid artery stenosis. She has prior history of carotid artery stenosis for 1 - 5 years. Her current treatment includes ASA EC, Plavix and a statin daily. (her son states that she recently had a fall and was instructed to hold her Plavix for 5 days but did not). She denies a history of CVA. She reports no TIA's, episodes of amaurosis fugax and episodes of lateralizing weakness. She has some memory loss/forgetfulness.      Mello Han is a [de-identified] y.o. female with the following history reviewed and recorded in [facility]:  Patient Active Problem List    Diagnosis Date Noted    Syncope and collapse 01/25/2018    Hypoesthesia of skin 01/25/2018    Diabetes mellitus ([address]     Essential hypertension     Late onset Alzheimer's disease without behavioral disturbance     Stage 3 chronic kidney disease     TIA (transient ischemic attack) 12/30/2017    Acute diarrhea 03/28/2016    Gastroenteritis     Nausea and vomiting     Status post placement of implantable loop recorder 10/12/2015    Carotid artery stenosis 01/22/2014    Syncope 01/09/2014     s/p loop recorder       Coronary atherosclerosis of native coronary artery 06/23/2011    Mixed hyperlipidemia 06/23/2011    Essential hypertension, benign 06/23/2011    Coronary atherosclerosis of artery bypass graft 06/23/2011     Current Outpatient Prescriptions   Medication Sig Dispense Refill    metoprolol succinate (TOPROL XL) 100 MG extended release tablet Take 100 mg by mouth daily       escitalopram (LEXAPRO) 10 MG tablet Take 20 mg by mouth daily       Insulin Glargine (BASAGLAR KWIKPEN SC) Inject 25 Units into the skin nightly       aspirin EC 81  Hypertension     S/P cardiac cath 11    Syncope 14    s/p loop recorder      Past Surgical History:   Procedure Laterality Date    APPENDECTOMY      CATARACT REMOVAL Left 10/2015     SECTION      CHOLECYSTECTOMY      CORONARY ARTERY BYPASS GRAFT  340199    LIMA to LAD (patent), SVG to intermediate branch (occluded); SVG to LAD diagonal (ocluded); SVG to RCA (subtotally occluded-stent placement )    DIAGNOSTIC CARDIAC CATH LAB PROCEDURE  [id number]    EF  60%    DIAGNOSTIC CARDIAC CATH LAB PROCEDURE  [id number]    EF 60%    DIAGNOSTIC CARDIAC CATH LAB PROCEDURE  00    EF 50%    MASTECTOMY       Right Breast    VASCULAR SURGERY  14 [facility]    Left carotid endarterectomy with vascular heart patch repair. Left cervical carotid arteriograms after endarterectomy.  VASCULAR SURGERY  14 [facility]    Right carotid endarterectomy with vascular patch repair. Right cervical carotid arteriograms after endarterectomy. Family History   Problem Relation Age of Onset    Coronary Art Dis Mother     Diabetes Mother     Hypertension Mother     Cancer Mother     Cancer Sister     Coronary Art Dis Brother     Cancer Brother     Coronary Art Dis Maternal Grandmother     Coronary Art Dis Maternal Grandfather     Diabetes Daughter      Social History   Substance Use Topics    Smoking status: Former Smoker    Smokeless tobacco: Never Used      Comment: quit around .  Alcohol use No       Review of Systems    Constitutional - no significant activity change, appetite change, or unexpected weight change. No fever or chills. No diaphoresis or significant fatigue. HENT - no significant rhinorrhea or epistaxis. No tinnitus or significant hearing loss. Eyes - no sudden vision change or amaurosis. Respiratory - no significant shortness of breath, wheezing, or stridor. No apnea, cough, or chest tightness associated with shortness of breath.   Cardiovascular - no chest pain, has had syncope and has loop recorder, no significant dizziness. No palpitations or significant leg swelling. No claudication. Gastrointestinal - no abdominal swelling or pain. No blood in stool. No severe constipation, diarrhea, nausea, or vomiting. Genitourinary - No difficulty urinating, dysuria, frequency, or urgency. No flank pain or hematuria. Musculoskeletal - no back pain, gait disturbance, or myalgia. Skin - no color change, rash, pallor, or new wound. Neurologic - no dizziness, facial asymmetry, or light headedness. No seizures. No speech difficulty or lateralizing weakness. Hematologic - no easy bruising or excessive bleeding. Psychiatric - no severe anxiety or nervousness. No confusion. All other review of systems are negative. Physical Exam    Vitals:    07/19/18 1409 07/19/18 1416   BP: (!) 143/73 (!) 170/77   Site: Left Arm Left Arm   Pulse: 65 60   Temp: 97.8 °F (36.6 °C)        Constitutional - well developed, well nourished. No diaphoresis or acute distress. HENT - head normocephalic. Right external ear canal appears normal.  Left external ear canal appears normal.  Septum appears midline. Eyes - conjunctiva normal.  EOMS normal.  No exudate. No icterus. Neck- ROM appears normal, no tracheal deviation. Cardiovascular - Regular rate and rhythm. Heart sounds are normal.  No murmur, rub, or gallop. Carotid pulses are 2+ to palpation bilaterally without bruit. Pulmonary - effort appears normal.  No respiratory distress. Lungs - Breath sounds normal. No wheezes or rales. Extremities - Radial and brachial pulses are 2+ to palpation bilaterally. Neurologic - alert and oriented X 3. Physiologic. Tongue midline. Face symmetric. Skin - warm, dry, and intact. No rash, erythema, or pallor.   Psychiatric - mood, affect, and behavior appear normal.  Judgment and thought processes appear normal.    Risk factors for atherosclerosis of all vascular beds have been reviewed with the

## 2018-10-02 LAB
ALBUMIN SERPL-MCNC: 3.9 G/DL (ref 3.5–5.2)
ALP BLD-CCNC: 86 U/L (ref 35–104)
ALT SERPL-CCNC: 13 U/L (ref 5–33)
ANION GAP SERPL CALCULATED.3IONS-SCNC: 11 MMOL/L (ref 7–19)
AST SERPL-CCNC: 15 U/L (ref 5–32)
BASOPHILS ABSOLUTE: 0.1 K/UL (ref 0–0.2)
BASOPHILS RELATIVE PERCENT: 0.8 % (ref 0–1)
BILIRUB SERPL-MCNC: 0.5 MG/DL (ref 0.2–1.2)
BUN BLDV-MCNC: 26 MG/DL (ref 8–23)
CALCIUM SERPL-MCNC: 9.8 MG/DL (ref 8.8–10.2)
CHLORIDE BLD-SCNC: 96 MMOL/L (ref 98–111)
CO2: 27 MMOL/L (ref 22–29)
CREAT SERPL-MCNC: 1.8 MG/DL (ref 0.5–0.9)
EOSINOPHILS ABSOLUTE: 0.3 K/UL (ref 0–0.6)
EOSINOPHILS RELATIVE PERCENT: 2.2 % (ref 0–5)
FOLATE: 6.5 NG/ML (ref 4.8–37.3)
GFR NON-AFRICAN AMERICAN: 27
GLUCOSE BLD-MCNC: 428 MG/DL (ref 74–109)
HCT VFR BLD CALC: 39.9 % (ref 37–47)
HEMOGLOBIN: 13.3 G/DL (ref 12–16)
LYMPHOCYTES ABSOLUTE: 2.6 K/UL (ref 1.1–4.5)
LYMPHOCYTES RELATIVE PERCENT: 22.8 % (ref 20–40)
MCH RBC QN AUTO: 29.2 PG (ref 27–31)
MCHC RBC AUTO-ENTMCNC: 33.3 G/DL (ref 33–37)
MCV RBC AUTO: 87.5 FL (ref 81–99)
MONOCYTES ABSOLUTE: 0.9 K/UL (ref 0–0.9)
MONOCYTES RELATIVE PERCENT: 8.3 % (ref 0–10)
NEUTROPHILS ABSOLUTE: 7.4 K/UL (ref 1.5–7.5)
NEUTROPHILS RELATIVE PERCENT: 65.5 % (ref 50–65)
PDW BLD-RTO: 12.3 % (ref 11.5–14.5)
PLATELET # BLD: 230 K/UL (ref 130–400)
PMV BLD AUTO: 10.7 FL (ref 9.4–12.3)
POTASSIUM SERPL-SCNC: 5.5 MMOL/L (ref 3.5–5)
RBC # BLD: 4.56 M/UL (ref 4.2–5.4)
SODIUM BLD-SCNC: 134 MMOL/L (ref 136–145)
TOTAL PROTEIN: 7.1 G/DL (ref 6.6–8.7)
VITAMIN B-12: 334 PG/ML (ref 211–946)
WBC # BLD: 11.3 K/UL (ref 4.8–10.8)

## 2018-10-07 ENCOUNTER — HOSPITAL ENCOUNTER (INPATIENT)
Age: 78
LOS: 2 days | Discharge: HOME | DRG: 312 | End: 2018-10-09
Attending: EMERGENCY MEDICINE | Admitting: FAMILY MEDICINE
Payer: MEDICARE

## 2018-10-07 ENCOUNTER — APPOINTMENT (OUTPATIENT)
Dept: GENERAL RADIOLOGY | Age: 78
DRG: 312 | End: 2018-10-07
Payer: MEDICARE

## 2018-10-07 ENCOUNTER — APPOINTMENT (OUTPATIENT)
Dept: CT IMAGING | Age: 78
DRG: 312 | End: 2018-10-07
Payer: MEDICARE

## 2018-10-07 DIAGNOSIS — R55 NEAR SYNCOPE: Primary | ICD-10-CM

## 2018-10-07 DIAGNOSIS — R73.9 HYPERGLYCEMIA: ICD-10-CM

## 2018-10-07 DIAGNOSIS — I49.9 CARDIAC ARRHYTHMIA, UNSPECIFIED CARDIAC ARRHYTHMIA TYPE: ICD-10-CM

## 2018-10-07 LAB
ALBUMIN SERPL-MCNC: 4.1 G/DL (ref 3.5–5.2)
ALP BLD-CCNC: 75 U/L (ref 35–104)
ALT SERPL-CCNC: 12 U/L (ref 5–33)
ANION GAP SERPL CALCULATED.3IONS-SCNC: 11 MMOL/L (ref 7–19)
APTT: 26.6 SEC (ref 26–36.2)
AST SERPL-CCNC: 16 U/L (ref 5–32)
BASOPHILS ABSOLUTE: 0.1 K/UL (ref 0–0.2)
BASOPHILS RELATIVE PERCENT: 1 % (ref 0–1)
BILIRUB SERPL-MCNC: 0.7 MG/DL (ref 0.2–1.2)
BILIRUBIN URINE: NEGATIVE
BLOOD, URINE: NEGATIVE
BUN BLDV-MCNC: 19 MG/DL (ref 8–23)
CALCIUM SERPL-MCNC: 9.8 MG/DL (ref 8.8–10.2)
CHLORIDE BLD-SCNC: 101 MMOL/L (ref 98–111)
CLARITY: CLEAR
CO2: 25 MMOL/L (ref 22–29)
COLOR: YELLOW
CREAT SERPL-MCNC: 1.3 MG/DL (ref 0.5–0.9)
EOSINOPHILS ABSOLUTE: 0.2 K/UL (ref 0–0.6)
EOSINOPHILS RELATIVE PERCENT: 2.6 % (ref 0–5)
GFR NON-AFRICAN AMERICAN: 39
GLUCOSE BLD-MCNC: 238 MG/DL (ref 70–99)
GLUCOSE BLD-MCNC: 244 MG/DL (ref 74–109)
GLUCOSE URINE: 250 MG/DL
HCT VFR BLD CALC: 40.3 % (ref 37–47)
HEMOGLOBIN: 13.5 G/DL (ref 12–16)
INR BLD: 0.94 (ref 0.88–1.18)
KETONES, URINE: NEGATIVE MG/DL
LEUKOCYTE ESTERASE, URINE: NEGATIVE
LYMPHOCYTES ABSOLUTE: 2.1 K/UL (ref 1.1–4.5)
LYMPHOCYTES RELATIVE PERCENT: 27.6 % (ref 20–40)
MCH RBC QN AUTO: 29.3 PG (ref 27–31)
MCHC RBC AUTO-ENTMCNC: 33.5 G/DL (ref 33–37)
MCV RBC AUTO: 87.4 FL (ref 81–99)
MONOCYTES ABSOLUTE: 0.6 K/UL (ref 0–0.9)
MONOCYTES RELATIVE PERCENT: 7.8 % (ref 0–10)
NEUTROPHILS ABSOLUTE: 4.6 K/UL (ref 1.5–7.5)
NEUTROPHILS RELATIVE PERCENT: 60.6 % (ref 50–65)
NITRITE, URINE: NEGATIVE
PDW BLD-RTO: 12.3 % (ref 11.5–14.5)
PERFORMED ON: ABNORMAL
PH UA: 5.5
PLATELET # BLD: 221 K/UL (ref 130–400)
PMV BLD AUTO: 10.4 FL (ref 9.4–12.3)
POTASSIUM SERPL-SCNC: 5 MMOL/L (ref 3.5–5)
PROTEIN UA: NEGATIVE MG/DL
PROTHROMBIN TIME: 12.5 SEC (ref 12–14.6)
RBC # BLD: 4.61 M/UL (ref 4.2–5.4)
SODIUM BLD-SCNC: 137 MMOL/L (ref 136–145)
SPECIFIC GRAVITY UA: 1.02
TOTAL PROTEIN: 7.3 G/DL (ref 6.6–8.7)
TROPONIN: <0.01 NG/ML (ref 0–0.03)
URINE REFLEX TO CULTURE: ABNORMAL
UROBILINOGEN, URINE: 0.2 E.U./DL
WBC # BLD: 7.7 K/UL (ref 4.8–10.8)

## 2018-10-07 PROCEDURE — G0378 HOSPITAL OBSERVATION PER HR: HCPCS

## 2018-10-07 PROCEDURE — 85610 PROTHROMBIN TIME: CPT

## 2018-10-07 PROCEDURE — 6360000002 HC RX W HCPCS: Performed by: FAMILY MEDICINE

## 2018-10-07 PROCEDURE — 70450 CT HEAD/BRAIN W/O DYE: CPT

## 2018-10-07 PROCEDURE — 85730 THROMBOPLASTIN TIME PARTIAL: CPT

## 2018-10-07 PROCEDURE — 71045 X-RAY EXAM CHEST 1 VIEW: CPT

## 2018-10-07 PROCEDURE — 81003 URINALYSIS AUTO W/O SCOPE: CPT

## 2018-10-07 PROCEDURE — 93005 ELECTROCARDIOGRAM TRACING: CPT

## 2018-10-07 PROCEDURE — 84484 ASSAY OF TROPONIN QUANT: CPT

## 2018-10-07 PROCEDURE — 2580000003 HC RX 258: Performed by: EMERGENCY MEDICINE

## 2018-10-07 PROCEDURE — 99285 EMERGENCY DEPT VISIT HI MDM: CPT

## 2018-10-07 PROCEDURE — 80053 COMPREHEN METABOLIC PANEL: CPT

## 2018-10-07 PROCEDURE — 36415 COLL VENOUS BLD VENIPUNCTURE: CPT

## 2018-10-07 PROCEDURE — 85025 COMPLETE CBC W/AUTO DIFF WBC: CPT

## 2018-10-07 PROCEDURE — 99285 EMERGENCY DEPT VISIT HI MDM: CPT | Performed by: EMERGENCY MEDICINE

## 2018-10-07 PROCEDURE — 2580000003 HC RX 258: Performed by: FAMILY MEDICINE

## 2018-10-07 PROCEDURE — 2140000000 HC CCU INTERMEDIATE R&B

## 2018-10-07 PROCEDURE — 82948 REAGENT STRIP/BLOOD GLUCOSE: CPT

## 2018-10-07 PROCEDURE — 6370000000 HC RX 637 (ALT 250 FOR IP): Performed by: FAMILY MEDICINE

## 2018-10-07 RX ORDER — DEXTROSE MONOHYDRATE 50 MG/ML
100 INJECTION, SOLUTION INTRAVENOUS PRN
Status: DISCONTINUED | OUTPATIENT
Start: 2018-10-07 | End: 2018-10-09 | Stop reason: HOSPADM

## 2018-10-07 RX ORDER — ACETAMINOPHEN 325 MG/1
650 TABLET ORAL EVERY 4 HOURS PRN
Status: DISCONTINUED | OUTPATIENT
Start: 2018-10-07 | End: 2018-10-09 | Stop reason: HOSPADM

## 2018-10-07 RX ORDER — NICOTINE POLACRILEX 4 MG
15 LOZENGE BUCCAL PRN
Status: DISCONTINUED | OUTPATIENT
Start: 2018-10-07 | End: 2018-10-09 | Stop reason: HOSPADM

## 2018-10-07 RX ORDER — 0.9 % SODIUM CHLORIDE 0.9 %
1000 INTRAVENOUS SOLUTION INTRAVENOUS ONCE
Status: COMPLETED | OUTPATIENT
Start: 2018-10-07 | End: 2018-10-07

## 2018-10-07 RX ORDER — DEXTROSE MONOHYDRATE 25 G/50ML
12.5 INJECTION, SOLUTION INTRAVENOUS PRN
Status: DISCONTINUED | OUTPATIENT
Start: 2018-10-07 | End: 2018-10-09 | Stop reason: HOSPADM

## 2018-10-07 RX ORDER — SODIUM CHLORIDE 9 MG/ML
INJECTION, SOLUTION INTRAVENOUS CONTINUOUS
Status: DISCONTINUED | OUTPATIENT
Start: 2018-10-07 | End: 2018-10-09

## 2018-10-07 RX ORDER — SODIUM CHLORIDE 0.9 % (FLUSH) 0.9 %
10 SYRINGE (ML) INJECTION PRN
Status: DISCONTINUED | OUTPATIENT
Start: 2018-10-07 | End: 2018-10-09 | Stop reason: HOSPADM

## 2018-10-07 RX ORDER — ASPIRIN 81 MG/1
81 TABLET ORAL DAILY
Status: DISCONTINUED | OUTPATIENT
Start: 2018-10-07 | End: 2018-10-09 | Stop reason: HOSPADM

## 2018-10-07 RX ORDER — METOPROLOL SUCCINATE 50 MG/1
50 TABLET, EXTENDED RELEASE ORAL DAILY
Status: DISCONTINUED | OUTPATIENT
Start: 2018-10-07 | End: 2018-10-08 | Stop reason: SINTOL

## 2018-10-07 RX ORDER — SODIUM CHLORIDE 0.9 % (FLUSH) 0.9 %
10 SYRINGE (ML) INJECTION EVERY 12 HOURS SCHEDULED
Status: DISCONTINUED | OUTPATIENT
Start: 2018-10-07 | End: 2018-10-09 | Stop reason: HOSPADM

## 2018-10-07 RX ORDER — INSULIN GLARGINE 100 [IU]/ML
25 INJECTION, SOLUTION SUBCUTANEOUS NIGHTLY
Status: DISCONTINUED | OUTPATIENT
Start: 2018-10-07 | End: 2018-10-09 | Stop reason: HOSPADM

## 2018-10-07 RX ORDER — DOXEPIN HYDROCHLORIDE 10 MG/1
10 CAPSULE ORAL NIGHTLY
Status: DISCONTINUED | OUTPATIENT
Start: 2018-10-07 | End: 2018-10-09 | Stop reason: HOSPADM

## 2018-10-07 RX ORDER — SIMVASTATIN 40 MG
40 TABLET ORAL NIGHTLY
Status: DISCONTINUED | OUTPATIENT
Start: 2018-10-07 | End: 2018-10-09 | Stop reason: HOSPADM

## 2018-10-07 RX ORDER — CLOPIDOGREL BISULFATE 75 MG/1
75 TABLET ORAL DAILY
COMMUNITY

## 2018-10-07 RX ORDER — LISINOPRIL 10 MG/1
10 TABLET ORAL DAILY
Status: DISCONTINUED | OUTPATIENT
Start: 2018-10-07 | End: 2018-10-09 | Stop reason: HOSPADM

## 2018-10-07 RX ORDER — ESCITALOPRAM OXALATE 10 MG/1
20 TABLET ORAL DAILY
Status: DISCONTINUED | OUTPATIENT
Start: 2018-10-07 | End: 2018-10-09 | Stop reason: HOSPADM

## 2018-10-07 RX ORDER — ISOSORBIDE MONONITRATE 30 MG/1
30 TABLET, EXTENDED RELEASE ORAL DAILY
Status: DISCONTINUED | OUTPATIENT
Start: 2018-10-07 | End: 2018-10-09 | Stop reason: HOSPADM

## 2018-10-07 RX ADMIN — ENOXAPARIN SODIUM 40 MG: 40 INJECTION SUBCUTANEOUS at 20:23

## 2018-10-07 RX ADMIN — SODIUM CHLORIDE: 9 INJECTION, SOLUTION INTRAVENOUS at 20:28

## 2018-10-07 RX ADMIN — INSULIN LISPRO 2 UNITS: 100 INJECTION, SOLUTION INTRAVENOUS; SUBCUTANEOUS at 20:23

## 2018-10-07 RX ADMIN — DOXEPIN HYDROCHLORIDE 10 MG: 10 CAPSULE ORAL at 20:23

## 2018-10-07 RX ADMIN — SODIUM CHLORIDE 1000 ML: 9 INJECTION, SOLUTION INTRAVENOUS at 14:43

## 2018-10-07 RX ADMIN — SIMVASTATIN 40 MG: 40 TABLET, FILM COATED ORAL at 20:23

## 2018-10-07 RX ADMIN — METOPROLOL SUCCINATE 50 MG: 50 TABLET, EXTENDED RELEASE ORAL at 20:23

## 2018-10-07 RX ADMIN — INSULIN GLARGINE 25 UNITS: 100 INJECTION, SOLUTION SUBCUTANEOUS at 20:24

## 2018-10-07 ASSESSMENT — ENCOUNTER SYMPTOMS
BACK PAIN: 0
SHORTNESS OF BREATH: 0
RHINORRHEA: 0
ABDOMINAL PAIN: 0
NAUSEA: 0
COUGH: 0
DIARRHEA: 0
VOMITING: 0
SORE THROAT: 0

## 2018-10-07 ASSESSMENT — PAIN SCALES - GENERAL: PAINLEVEL_OUTOF10: 0

## 2018-10-07 NOTE — ED PROVIDER NOTES
can answer most questions appropriately but unable to provide much meaningful history to these specific events. HPI    Nursing Notes were reviewed. REVIEW OF SYSTEMS    (2-9 systems for level 4, 10 or more for level 5)     Review of Systems   Constitutional: Negative for chills and fever. HENT: Negative for rhinorrhea and sore throat. Respiratory: Negative for cough and shortness of breath. Cardiovascular: Negative for chest pain and leg swelling. Gastrointestinal: Negative for abdominal pain, diarrhea, nausea and vomiting. Genitourinary: Negative for dysuria, frequency and urgency. Musculoskeletal: Negative for back pain and neck pain. Neurological: Positive for speech difficulty, light-headedness and numbness. Negative for dizziness, syncope, weakness and headaches. All other systems reviewed and are negative. PAST MEDICAL HISTORY     Past Medical History:   Diagnosis Date    [facility] ([facility] [address]     CAD (coronary artery disease), native coronary artery     Carotid artery stenosis 2014    [facility]     Diabetes mellitus type II     Hyperlipidemia     Cholesterol management per Callie Woodward M.D.    Hypertension     S/P cardiac cath 11    Syncope 14    s/p loop recorder          SURGICAL HISTORY       Past Surgical History:   Procedure Laterality Date    APPENDECTOMY      CATARACT REMOVAL Left 10/2015     SECTION      CHOLECYSTECTOMY      CORONARY ARTERY BYPASS GRAFT  [id number]    LIMA to LAD (patent), SVG to intermediate branch (occluded); SVG to LAD diagonal (ocluded); SVG to RCA (subtotally occluded-stent placement )    DIAGNOSTIC CARDIAC CATH LAB PROCEDURE  [id number]    EF  60%    DIAGNOSTIC CARDIAC CATH LAB PROCEDURE  [id number]    EF 60%    DIAGNOSTIC CARDIAC CATH LAB PROCEDURE  00    EF 50%    MASTECTOMY       Right Breast    VASCULAR SURGERY  14 [facility]    Left carotid endarterectomy with vascular heart patch repair. PHYSICAL EXAM    (up to 7 for level 4, 8 or more for level 5)     ED Triage Vitals   BP Temp Temp src Pulse Resp SpO2 Height Weight   -- -- -- -- -- -- -- --       Physical Exam   Constitutional: She is oriented to person, place, and time. She appears well-developed and well-nourished. No distress. HENT:   Head: Normocephalic and atraumatic. Right Ear: External ear normal.   Left Ear: External ear normal.   Eyes: Pupils are equal, round, and reactive to light. Conjunctivae and EOM are normal.   Neck: Normal range of motion. No tracheal deviation present. Cardiovascular: Normal rate, regular rhythm, normal heart sounds and intact distal pulses. No murmur heard. Pulmonary/Chest: Breath sounds normal. No respiratory distress. She has no wheezes. She has no rales. Abdominal: Soft. She exhibits no mass. There is no tenderness. Musculoskeletal: Normal range of motion. She exhibits no edema. Neurological: She is alert and oriented to person, place, and time. No cranial nerve deficit or sensory deficit. She exhibits normal muscle tone. Coordination normal. GCS eye subscore is 4. GCS verbal subscore is 5. GCS motor subscore is 6. Speech clear   Skin: Skin is warm and dry. Vitals reviewed. DIAGNOSTIC RESULTS     EKG: All EKG's are interpreted by the Emergency Department Physician who either signs or Co-signs this chart in the absence of a cardiologist.    52, normal sinus rhythm, sinus pause also noted approx 2.6-2.8 seconds on rhythm strip    RADIOLOGY:   Non-plain film images such as CT, Ultrasound and MRI are read by the radiologist. Plain radiographic images are visualized and preliminarily interpreted by the emergency physician with the below findings:          XR CHEST PORTABLE   Final Result   No acute cardiopulmonary process. Signed by Dr Chan Araiza on 10/7/2018 2:05 PM      CT Head WO Contrast   Final Result   Impression:    1. No CT evidence of an acute intracranial process. otherwise noted below, none     Procedures    FINAL IMPRESSION      1. Near syncope    2. Cardiac arrhythmia, unspecified cardiac arrhythmia type    3. Hyperglycemia          DISPOSITION/PLAN   DISPOSITION        PATIENT REFERRED TO:  No follow-up provider specified.     DISCHARGE MEDICATIONS:  New Prescriptions    No medications on file          (Please note that portions of this note were completed with a voice recognition program.  Efforts were made to edit the dictations but occasionally words are mis-transcribed.)    Lakshmi Bailey MD (electronically signed)  Attending Emergency Physician       Flores Han MD  10/07/18 0590       Flores Han MD  10/10/18 2385

## 2018-10-08 ENCOUNTER — APPOINTMENT (OUTPATIENT)
Dept: MRI IMAGING | Age: 78
DRG: 312 | End: 2018-10-08
Payer: MEDICARE

## 2018-10-08 LAB
C DIFFICILE TOXIN, EIA: NORMAL
GLUCOSE BLD-MCNC: 134 MG/DL (ref 70–99)
GLUCOSE BLD-MCNC: 174 MG/DL (ref 70–99)
GLUCOSE BLD-MCNC: 333 MG/DL (ref 70–99)
GLUCOSE BLD-MCNC: 93 MG/DL (ref 70–99)
PERFORMED ON: ABNORMAL
PERFORMED ON: NORMAL

## 2018-10-08 PROCEDURE — 70551 MRI BRAIN STEM W/O DYE: CPT

## 2018-10-08 PROCEDURE — 2140000000 HC CCU INTERMEDIATE R&B

## 2018-10-08 PROCEDURE — 99223 1ST HOSP IP/OBS HIGH 75: CPT | Performed by: INTERNAL MEDICINE

## 2018-10-08 PROCEDURE — 99223 1ST HOSP IP/OBS HIGH 75: CPT | Performed by: PSYCHIATRY & NEUROLOGY

## 2018-10-08 PROCEDURE — 93880 EXTRACRANIAL BILAT STUDY: CPT

## 2018-10-08 PROCEDURE — 6360000002 HC RX W HCPCS: Performed by: FAMILY MEDICINE

## 2018-10-08 PROCEDURE — 82948 REAGENT STRIP/BLOOD GLUCOSE: CPT

## 2018-10-08 PROCEDURE — 6370000000 HC RX 637 (ALT 250 FOR IP): Performed by: PSYCHIATRY & NEUROLOGY

## 2018-10-08 PROCEDURE — 95816 EEG AWAKE AND DROWSY: CPT

## 2018-10-08 PROCEDURE — 2580000003 HC RX 258: Performed by: FAMILY MEDICINE

## 2018-10-08 PROCEDURE — 6370000000 HC RX 637 (ALT 250 FOR IP): Performed by: FAMILY MEDICINE

## 2018-10-08 PROCEDURE — 87324 CLOSTRIDIUM AG IA: CPT

## 2018-10-08 RX ORDER — LEVETIRACETAM 250 MG/1
250 TABLET ORAL 2 TIMES DAILY
Status: DISCONTINUED | OUTPATIENT
Start: 2018-10-08 | End: 2018-10-09 | Stop reason: HOSPADM

## 2018-10-08 RX ADMIN — LEVETIRACETAM 250 MG: 250 TABLET ORAL at 19:28

## 2018-10-08 RX ADMIN — LEVETIRACETAM 250 MG: 250 TABLET ORAL at 11:20

## 2018-10-08 RX ADMIN — SIMVASTATIN 40 MG: 40 TABLET, FILM COATED ORAL at 19:30

## 2018-10-08 RX ADMIN — ASPIRIN 81 MG: 81 TABLET, COATED ORAL at 11:17

## 2018-10-08 RX ADMIN — INSULIN LISPRO 2 UNITS: 100 INJECTION, SOLUTION INTRAVENOUS; SUBCUTANEOUS at 18:16

## 2018-10-08 RX ADMIN — DOXEPIN HYDROCHLORIDE 10 MG: 10 CAPSULE ORAL at 19:28

## 2018-10-08 RX ADMIN — ESCITALOPRAM OXALATE 20 MG: 10 TABLET ORAL at 11:17

## 2018-10-08 RX ADMIN — INSULIN GLARGINE 25 UNITS: 100 INJECTION, SOLUTION SUBCUTANEOUS at 19:47

## 2018-10-08 RX ADMIN — ENOXAPARIN SODIUM 40 MG: 40 INJECTION SUBCUTANEOUS at 18:14

## 2018-10-08 RX ADMIN — METOPROLOL SUCCINATE 50 MG: 50 TABLET, EXTENDED RELEASE ORAL at 11:20

## 2018-10-08 RX ADMIN — Medication 10 ML: at 19:29

## 2018-10-08 RX ADMIN — SODIUM CHLORIDE: 9 INJECTION, SOLUTION INTRAVENOUS at 13:18

## 2018-10-08 RX ADMIN — LINAGLIPTIN 5 MG: 5 TABLET, FILM COATED ORAL at 11:17

## 2018-10-08 RX ADMIN — INSULIN LISPRO 8 UNITS: 100 INJECTION, SOLUTION INTRAVENOUS; SUBCUTANEOUS at 11:34

## 2018-10-08 RX ADMIN — LISINOPRIL 10 MG: 10 TABLET ORAL at 11:17

## 2018-10-08 RX ADMIN — ISOSORBIDE MONONITRATE 30 MG: 30 TABLET, EXTENDED RELEASE ORAL at 11:17

## 2018-10-08 ASSESSMENT — PAIN SCALES - GENERAL: PAINLEVEL_OUTOF10: 1

## 2018-10-08 NOTE — CONSULTS
Inpatient consult to Neurology  Consult performed by: Marlys Lopez ordered by: Saundra Benjamin Neurology Consult        Patient:   Lion Eason  MR#:    [id number]  Account Number:                   [id number]      Room:    [address] [place] [address]   YOB: 1940  Date of Progress Note: 10/8/2018  Time of Note                           7:57 AM  Attending Physician:  Sanjana Herman MD  Consulting Physician:   Rowan Nieto M.D.        [address] [place] [address] Avenue:  Spells of AMS/staring/speech difficulties       HISTORY OF PRESENT ILLNESS:   This [de-identified] y.o. female who presents with an episodes of AMS, staring and speech difficulties. The patient presented to ER on 10/7/18 with 2 episodes. The family believes she gets lightheaded and her legs seen to give out. As per the family the patient has a staring episode for about a minute with some confused speech. The entire episode apparently lasted less than 10 minutes. There was no evidence of shaking. She does have a loop recorder in place. She did have some pauses in EKG in ER up to 2.8 seconds. NO issues overnight on telemetry as per nursing. No episodes of confusion. She does have a history of TIA /stroke with bilateral CEAs in the past. There are some issues with dementia. REVIEW OF SYSTEMS:  Constitutional - No fever or chills. No diaphoresis or significant fatigue. HENT -  No tinnitus or significant hearing loss. Eyes - no sudden vision change or eye pain  Respiratory - no significant shortness of breath or cough  Cardiovascular - no chest pain No palpitations or significant leg swelling  Gastrointestinal - no abdominal swelling or pain. Genitourinary - No difficulty urinating, dysuria  Musculoskeletal - no back pain or myalgia. Skin - no color change or rash  Neurologic - No seizures. No lateralizing weakness. Hematologic - no easy bruising or excessive bleeding. Psychiatric - no severe anxiety or nervousness.    All other review of

## 2018-10-09 VITALS
SYSTOLIC BLOOD PRESSURE: 153 MMHG | HEIGHT: 65 IN | OXYGEN SATURATION: 97 % | RESPIRATION RATE: 17 BRPM | DIASTOLIC BLOOD PRESSURE: 70 MMHG | TEMPERATURE: 97.8 F | WEIGHT: 139.2 LBS | HEART RATE: 54 BPM | BODY MASS INDEX: 23.19 KG/M2

## 2018-10-09 LAB
ANION GAP SERPL CALCULATED.3IONS-SCNC: 10 MMOL/L (ref 7–19)
BASOPHILS ABSOLUTE: 0.1 K/UL (ref 0–0.2)
BASOPHILS RELATIVE PERCENT: 0.9 % (ref 0–1)
BUN BLDV-MCNC: 15 MG/DL (ref 8–23)
CALCIUM SERPL-MCNC: 8.5 MG/DL (ref 8.8–10.2)
CHLORIDE BLD-SCNC: 110 MMOL/L (ref 98–111)
CO2: 23 MMOL/L (ref 22–29)
CREAT SERPL-MCNC: 1.1 MG/DL (ref 0.5–0.9)
EKG P AXIS: NORMAL DEGREES
EKG P-R INTERVAL: NORMAL MS
EKG Q-T INTERVAL: 448 MS
EKG QRS DURATION: 94 MS
EKG QTC CALCULATION (BAZETT): 422 MS
EKG T AXIS: 129 DEGREES
EOSINOPHILS ABSOLUTE: 0.3 K/UL (ref 0–0.6)
EOSINOPHILS RELATIVE PERCENT: 4.7 % (ref 0–5)
GFR NON-AFRICAN AMERICAN: 48
GLUCOSE BLD-MCNC: 100 MG/DL (ref 70–99)
GLUCOSE BLD-MCNC: 207 MG/DL (ref 70–99)
GLUCOSE BLD-MCNC: 209 MG/DL (ref 70–99)
GLUCOSE BLD-MCNC: 98 MG/DL (ref 74–109)
HCT VFR BLD CALC: 35.4 % (ref 37–47)
HEMOGLOBIN: 11.5 G/DL (ref 12–16)
LYMPHOCYTES ABSOLUTE: 1.8 K/UL (ref 1.1–4.5)
LYMPHOCYTES RELATIVE PERCENT: 31.9 % (ref 20–40)
MCH RBC QN AUTO: 28.7 PG (ref 27–31)
MCHC RBC AUTO-ENTMCNC: 32.5 G/DL (ref 33–37)
MCV RBC AUTO: 88.3 FL (ref 81–99)
MONOCYTES ABSOLUTE: 0.5 K/UL (ref 0–0.9)
MONOCYTES RELATIVE PERCENT: 8.4 % (ref 0–10)
NEUTROPHILS ABSOLUTE: 3.1 K/UL (ref 1.5–7.5)
NEUTROPHILS RELATIVE PERCENT: 53.8 % (ref 50–65)
PDW BLD-RTO: 12.3 % (ref 11.5–14.5)
PERFORMED ON: ABNORMAL
PLATELET # BLD: 157 K/UL (ref 130–400)
PMV BLD AUTO: 10.3 FL (ref 9.4–12.3)
POTASSIUM SERPL-SCNC: 4.3 MMOL/L (ref 3.5–5)
RBC # BLD: 4.01 M/UL (ref 4.2–5.4)
SODIUM BLD-SCNC: 143 MMOL/L (ref 136–145)
WBC # BLD: 5.7 K/UL (ref 4.8–10.8)

## 2018-10-09 PROCEDURE — G8979 MOBILITY GOAL STATUS: HCPCS

## 2018-10-09 PROCEDURE — 85025 COMPLETE CBC W/AUTO DIFF WBC: CPT

## 2018-10-09 PROCEDURE — 36415 COLL VENOUS BLD VENIPUNCTURE: CPT

## 2018-10-09 PROCEDURE — G8978 MOBILITY CURRENT STATUS: HCPCS

## 2018-10-09 PROCEDURE — 99232 SBSQ HOSP IP/OBS MODERATE 35: CPT | Performed by: INTERNAL MEDICINE

## 2018-10-09 PROCEDURE — 82948 REAGENT STRIP/BLOOD GLUCOSE: CPT

## 2018-10-09 PROCEDURE — 97162 PT EVAL MOD COMPLEX 30 MIN: CPT

## 2018-10-09 PROCEDURE — 80048 BASIC METABOLIC PNL TOTAL CA: CPT

## 2018-10-09 PROCEDURE — 0296T PR EXT ECG > 48HR TO 21 DAY RCRD W/CONECT INTL RCRD: CPT | Performed by: INTERNAL MEDICINE

## 2018-10-09 PROCEDURE — 99233 SBSQ HOSP IP/OBS HIGH 50: CPT | Performed by: PSYCHIATRY & NEUROLOGY

## 2018-10-09 PROCEDURE — 2580000003 HC RX 258: Performed by: FAMILY MEDICINE

## 2018-10-09 PROCEDURE — 93229 REMOTE 30 DAY ECG TECH SUPP: CPT

## 2018-10-09 PROCEDURE — 95816 EEG AWAKE AND DROWSY: CPT | Performed by: PSYCHIATRY & NEUROLOGY

## 2018-10-09 PROCEDURE — 6370000000 HC RX 637 (ALT 250 FOR IP): Performed by: FAMILY MEDICINE

## 2018-10-09 PROCEDURE — 97116 GAIT TRAINING THERAPY: CPT

## 2018-10-09 PROCEDURE — 6370000000 HC RX 637 (ALT 250 FOR IP): Performed by: PSYCHIATRY & NEUROLOGY

## 2018-10-09 RX ORDER — LEVETIRACETAM 250 MG/1
250 TABLET ORAL 2 TIMES DAILY
Qty: 60 TABLET | Refills: 3 | Status: ON HOLD | OUTPATIENT
Start: 2018-10-09 | End: 2019-01-01 | Stop reason: SDUPTHER

## 2018-10-09 RX ADMIN — ASPIRIN 81 MG: 81 TABLET, COATED ORAL at 09:50

## 2018-10-09 RX ADMIN — LISINOPRIL 10 MG: 10 TABLET ORAL at 09:50

## 2018-10-09 RX ADMIN — ACETAMINOPHEN 650 MG: 325 TABLET ORAL at 02:20

## 2018-10-09 RX ADMIN — ISOSORBIDE MONONITRATE 30 MG: 30 TABLET, EXTENDED RELEASE ORAL at 09:50

## 2018-10-09 RX ADMIN — ESCITALOPRAM OXALATE 20 MG: 10 TABLET ORAL at 09:50

## 2018-10-09 RX ADMIN — INSULIN LISPRO 2 UNITS: 100 INJECTION, SOLUTION INTRAVENOUS; SUBCUTANEOUS at 12:59

## 2018-10-09 RX ADMIN — SODIUM CHLORIDE: 9 INJECTION, SOLUTION INTRAVENOUS at 09:06

## 2018-10-09 RX ADMIN — LEVETIRACETAM 250 MG: 250 TABLET ORAL at 09:49

## 2018-10-09 RX ADMIN — LINAGLIPTIN 5 MG: 5 TABLET, FILM COATED ORAL at 09:50

## 2018-10-09 ASSESSMENT — PAIN SCALES - GENERAL
PAINLEVEL_OUTOF10: 3
PAINLEVEL_OUTOF10: 6

## 2018-10-09 NOTE — PROGRESS NOTES
syncope 10/07/2018     Priority: Low    Syncope and collapse 01/25/2018     Priority: Low    Hypoesthesia of skin 01/25/2018     Priority: Low    Diabetes mellitus ([facility]      Priority: Low    Essential hypertension      Priority: Low    Late onset Alzheimer's disease without behavioral disturbance      Priority: Low    Stage 3 chronic kidney disease ([facility]      Priority: Low    TIA (transient ischemic attack) 12/30/2017     Priority: Low    Acute diarrhea 03/28/2016     Priority: Low    Gastroenteritis      Priority: Low    Nausea and vomiting      Priority: Low    Status post placement of implantable loop recorder 10/12/2015     Priority: Low    Carotid artery stenosis 01/22/2014     Priority: Low    Syncope 01/09/2014     Priority: Low    Coronary atherosclerosis of native coronary artery 06/23/2011     Priority: Low    Mixed hyperlipidemia 06/23/2011     Priority: Low    Essential hypertension, benign 06/23/2011     Priority: Low    Coronary atherosclerosis of artery bypass graft 06/23/2011     Priority: Low     Current Facility-Administered Medications   Medication Dose Route Frequency Provider Last Rate Last Dose    levETIRAcetam (KEPPRA) tablet 250 mg  250 mg Oral BID Alyson Flores MD   250 mg at 10/09/18 0949    sodium chloride flush 0.9 % injection 10 mL  10 mL Intravenous 2 times per day Shawna Mello MD   10 mL at 10/08/18 1929    sodium chloride flush 0.9 % injection 10 mL  10 mL Intravenous PRN Shawna Mello MD        acetaminophen (TYLENOL) tablet 650 mg  650 mg Oral Q4H PRN Shawna Mello MD   650 mg at 10/09/18 0220    enoxaparin (LOVENOX) injection 40 mg  40 mg Subcutaneous Daily Shawna Mello MD   40 mg at 10/08/18 1814    aspirin EC tablet 81 mg  81 mg Oral Daily Shawna Mello MD   81 mg at 10/09/18 0950    doxepin (SINEQUAN) capsule 10 mg  10 mg Oral Nightly Shawna Mello MD   10 mg at 10/08/18 1928    escitalopram (LEXAPRO) tablet 20 mg  20 mg Oral Daily

## 2018-10-09 NOTE — PROGRESS NOTES
Patient:   Yenifer Acevedo  MR#:    [id number]   Room:    [address] [place] [address]   YOB: 1940  Date of Progress Note: 10/9/2018  Time of Note                           11:59 AM  Consulting Physician:   Roel Parra M.D. Attending Physician:  Melissa Buchanan MD     Chief complaint  Spells of AMS/staring/speech difficulties    S:This [de-identified] y.o. female  who presents with an episodes of AMS, staring and speech difficulties. The patient presented to ER on 10/7/18 with 2 episodes. The family believes she gets lightheaded and her legs seen to give out. As per the family the patient has a staring episode for about a minute with some confused speech. The entire episode apparently lasted less than 10 minutes. There was no evidence of shaking. She does have a loop recorder in place. She did have some pauses in EKG in ER up to 2.8 seconds. NO issues overnight on telemetry as per nursing. No episodes of confusion. She does have a history of TIA /stroke with bilateral CEAs in the past. There are some issues with dementia. No issues overnight.     REVIEW OF SYSTEMS:  Constitutional: No fevers No chills  Neck:No stiffness  Respiratory: No shortness of breath  Cardiovascular: No chest pain No palpitations  Gastrointestinal: No abdominal pain    Genitourinary: No Dysuria  Neurological: No headache, no confusion    Past Medical History:      Diagnosis Date    [facility]     CAD (coronary artery disease), native coronary artery     Carotid artery stenosis 2014    [facility] cancer ([facility]     Diabetes mellitus type II     Hyperlipidemia     Cholesterol management per pcp, Reno Mckeon M.D.    Hypertension     S/P cardiac cath 11    Syncope 14    s/p loop recorder        Past Surgical History:      Procedure Laterality Date    APPENDECTOMY      CATARACT REMOVAL Left 10/2015     SECTION      CHOLECYSTECTOMY      CORONARY ARTERY BYPASS GRAFT  [id number]    LIMA to LAD (patent), SVG to intermediate branch tongue  Neck-symmetric, no masses noted, no jugular vein distension  Respiration- chest wall appears symmetric, good expansion,   normal effort without use of accessory muscles  Musculoskeletal - no significant wasting of muscles noted, no bony deformities  Extremities-no clubbing, cyanosis or edema  Skin - warm, dry, and intact. No rash, erythema, or pallor. Psychiatric - mood, affect, and behavior appear normal.      Neurological exam  Awake, alert, fluent oriented appropriate affect  Attention and concentration appear appropriate  Recent and remote memory appears unremarkable  Speech normal without dysarthria  No clear issues with language of fund of knowledge     Cranial Nerve Exam     CN III, IV,VI-EOMI, No nystagmus, conjugate eye movements, no ptosis    CN VII-no facial assymetry       Motor Exam  antigravity throughout upper and lower extremities bilaterally      Tremors- no tremors in hands or head noted     Gait  Not tested      Nursing/pcp notes, imaging,labs and vitals reviewed.      PT,OT and/or speech notes reviewed    Lab Results   Component Value Date    WBC 5.7 10/09/2018    HGB 11.5 (L) 10/09/2018    HCT 35.4 (L) 10/09/2018    MCV 88.3 10/09/2018     10/09/2018     Lab Results   Component Value Date     10/09/2018    K 4.3 10/09/2018     10/09/2018    CO2 23 10/09/2018    BUN 15 10/09/2018    CREATININE 1.1 (H) 10/09/2018    GLUCOSE 98 10/09/2018    CALCIUM 8.5 (L) 10/09/2018    PROT 7.3 10/07/2018    LABALBU 4.1 10/07/2018    BILITOT 0.7 10/07/2018    ALKPHOS 75 10/07/2018    AST 16 10/07/2018    ALT 12 10/07/2018    LABGLOM 48 (A) 10/09/2018    GLOB 3.6 03/24/2016     Lab Results   Component Value Date    INR 0.94 10/07/2018    INR 0.93 12/30/2017    INR 0.95 03/06/2015    PROTIME 12.5 10/07/2018    PROTIME 12.4 12/30/2017    PROTIME 12.4 03/06/2015     EEG [[id number]] Resulted: 10/09/18 0621   Updated: 10/09/18 6801    Narrative:     Yolanda Finley MD     10/9/2018  6:21

## 2018-10-09 NOTE — CONSULTS
bpm,  with Acute changes    Troponin:  negative myocardial necrosis (  <0.01); the creatinine is abnormal 1.1    CBC:   Recent Labs      10/07/18   1315  10/09/18   0201   WBC  7.7  5.7   HGB  13.5  11.5*   HCT  40.3  35.4*   MCV  87.4  88.3   PLT  221  157     BMP:   Recent Labs      10/07/18   1315  10/09/18   0201   NA  137  143   K  5.0  4.3   CL  101  110   CO2  25  23   BUN  19  15   CREATININE  1.3*  1.1*     Cardiac Enzymes:   Recent Labs      10/07/18   1315   TROPONINI  <0.01     PT/INR:   Recent Labs      10/07/18   1315   PROTIME  12.5   INR  0.94     APTT:   Recent Labs      10/07/18   1315   APTT  26.6     Liver Profile:  Lab Results   Component Value Date    AST 16 10/07/2018    ALT 12 10/07/2018    BILITOT 0.7 10/07/2018    ALKPHOS 75 10/07/2018    ALKPHOS 83 07/22/2011     Lab Results   Component Value Date    CHOL 158 12/31/2017    HDL 31 12/31/2017    TRIG 254 12/31/2017     TSH:  Lab Results   Component Value Date    TSH 4.190 01/01/2018     UA:   Lab Results   Component Value Date    COLORU YELLOW 10/07/2018    PHUR 5.5 10/07/2018    LABCAST 3-5 Hyaline 03/24/2016    WBCUA 11 12/31/2017    RBCUA 1 12/31/2017    RBCUA 3 03/17/2015    BACTERIA NEGATIVE 12/31/2017    CLARITYU Clear 10/07/2018    SPECGRAV 1.024 10/07/2018    LEUKOCYTESUR Negative 10/07/2018    UROBILINOGEN 0.2 10/07/2018    BILIRUBINUR Negative 10/07/2018    BLOODU Negative 10/07/2018    GLUCOSEU 250 10/07/2018             ALL THE CARDIOLOGY PROBLEMS ARE LISTED ABOVE; HOWEVER, THE FOLLOWING SPECIFIC CARDIAC PROBLEMS WERE ADDRESSED AND TREATED DURING THE HOSPITAL VISIT TODAY:                                                                                                                                                                                                                                            MEDICAL DECISION MAKING             Cardiac Specific Problem / Diagnosis  Discussion and Data Reviewed Diagnostic Procedures atherosclerosis of native coronary artery        Essential hypertension, benign        Syncope        Carotid artery stenosis        TIA (transient ischemic attack)        Essential hypertension        Syncope and collapse        Near syncope                Subjective: Altered mental status    Objective:     I personally preformed this physical examination in addition to the physical examination preformed by the mid level noted above. GENERAL - well developed and well nourished    HEENT -  PERRLA, Hearing appears normal, conjunctiva and lids are normal, ears and nose appear normal  NECK - no thyromegaly, no JVD, trachea is in the midline  CARDIOVASCULAR - PMI is in the mid line clavicular position, Normal S1 and S2 with a grade 2/6 systolic murmur. No S3 or S4    PULMONARY - No respiratory distress. scattered wheezes and rales. Breath sounds in both  lung fields are Decreased  ABDOMEN  - soft, non tender, no rebound, no hepatomegaly or splenomegaly  MUSCULOSKELETAL  - gait and station are normal, digitals and nails are without clubbing or cyanosis  EXTREMITIES - trace edema  NEUROLOGIC - cranial nerves, 2-12, are normal  SKIN - turgor is normal, no rash  PSYCHIATRIC - normal mood and affect, alert and orientated x 3, judgement and insight appear appropriate    I examined the patient for these specific problems:    ASSESSMENT:    ALL THE CARDIOLOGY PROBLEMS ARE LISTED ABOVE; HOWEVER, THE FOLLOWING SPECIFIC CARDIAC PROBLEMS WERE ADDRESSED AND TREATED DURING THE HOSPITAL VISIT TODAY:                                                                                                                                                                                                                                              MEDICAL DECISION MAKING             Cardiac Specific Problem / Diagnosis  Discussion and Data Reviewed Diagnostic Procedures Ordered Management Options Selected           1.  Presenting problem /

## 2018-10-29 ENCOUNTER — APPOINTMENT (OUTPATIENT)
Dept: GENERAL RADIOLOGY | Age: 78
DRG: 249 | End: 2018-10-29
Payer: MEDICARE

## 2018-10-29 ENCOUNTER — HOSPITAL ENCOUNTER (INPATIENT)
Age: 78
LOS: 7 days | Discharge: NURSING, REHAB OR LONG-TERM CARE | DRG: 249 | End: 2018-11-05
Attending: EMERGENCY MEDICINE | Admitting: FAMILY MEDICINE
Payer: MEDICARE

## 2018-10-29 DIAGNOSIS — I25.110 CORONARY ARTERY DISEASE INVOLVING NATIVE HEART WITH UNSTABLE ANGINA PECTORIS, UNSPECIFIED VESSEL OR LESION TYPE (HCC): Primary | ICD-10-CM

## 2018-10-29 DIAGNOSIS — I16.0 HYPERTENSIVE URGENCY: ICD-10-CM

## 2018-10-29 PROBLEM — I20.9 ANGINA PECTORIS (HCC): Status: ACTIVE | Noted: 2018-10-29

## 2018-10-29 LAB
ALBUMIN SERPL-MCNC: 4.5 G/DL (ref 3.5–5.2)
ALP BLD-CCNC: 93 U/L (ref 35–104)
ALT SERPL-CCNC: 14 U/L (ref 5–33)
ANION GAP SERPL CALCULATED.3IONS-SCNC: 12 MMOL/L (ref 7–19)
AST SERPL-CCNC: 21 U/L (ref 5–32)
BASOPHILS ABSOLUTE: 0.1 K/UL (ref 0–0.2)
BASOPHILS RELATIVE PERCENT: 1 % (ref 0–1)
BILIRUB SERPL-MCNC: 0.7 MG/DL (ref 0.2–1.2)
BUN BLDV-MCNC: 9 MG/DL (ref 8–23)
CALCIUM SERPL-MCNC: 9.9 MG/DL (ref 8.8–10.2)
CHLORIDE BLD-SCNC: 100 MMOL/L (ref 98–111)
CO2: 29 MMOL/L (ref 22–29)
CREAT SERPL-MCNC: 1 MG/DL (ref 0.5–0.9)
EOSINOPHILS ABSOLUTE: 0.3 K/UL (ref 0–0.6)
EOSINOPHILS RELATIVE PERCENT: 3.8 % (ref 0–5)
GFR NON-AFRICAN AMERICAN: 53
GLUCOSE BLD-MCNC: 231 MG/DL (ref 70–99)
GLUCOSE BLD-MCNC: 240 MG/DL (ref 74–109)
GLUCOSE BLD-MCNC: 246 MG/DL (ref 70–99)
HCT VFR BLD CALC: 44.6 % (ref 37–47)
HEMOGLOBIN: 14.9 G/DL (ref 12–16)
LIPASE: 129 U/L (ref 13–60)
LYMPHOCYTES ABSOLUTE: 2.2 K/UL (ref 1.1–4.5)
LYMPHOCYTES RELATIVE PERCENT: 30.5 % (ref 20–40)
MCH RBC QN AUTO: 28.6 PG (ref 27–31)
MCHC RBC AUTO-ENTMCNC: 33.4 G/DL (ref 33–37)
MCV RBC AUTO: 85.6 FL (ref 81–99)
MONOCYTES ABSOLUTE: 0.6 K/UL (ref 0–0.9)
MONOCYTES RELATIVE PERCENT: 8.3 % (ref 0–10)
NEUTROPHILS ABSOLUTE: 4.1 K/UL (ref 1.5–7.5)
NEUTROPHILS RELATIVE PERCENT: 56.1 % (ref 50–65)
PDW BLD-RTO: 12.3 % (ref 11.5–14.5)
PERFORMED ON: ABNORMAL
PERFORMED ON: ABNORMAL
PERFORMED ON: NORMAL
PLATELET # BLD: 210 K/UL (ref 130–400)
PMV BLD AUTO: 9.9 FL (ref 9.4–12.3)
POC TROPONIN I: 0 NG/ML (ref 0–0.08)
POTASSIUM SERPL-SCNC: 4.2 MMOL/L (ref 3.5–5)
PRO-BNP: 488 PG/ML (ref 0–1800)
RBC # BLD: 5.21 M/UL (ref 4.2–5.4)
SODIUM BLD-SCNC: 141 MMOL/L (ref 136–145)
TOTAL PROTEIN: 7.8 G/DL (ref 6.6–8.7)
TROPONIN: <0.01 NG/ML (ref 0–0.03)
TROPONIN: <0.01 NG/ML (ref 0–0.03)
WBC # BLD: 7.4 K/UL (ref 4.8–10.8)

## 2018-10-29 PROCEDURE — 93005 ELECTROCARDIOGRAM TRACING: CPT

## 2018-10-29 PROCEDURE — 80053 COMPREHEN METABOLIC PANEL: CPT

## 2018-10-29 PROCEDURE — 83690 ASSAY OF LIPASE: CPT

## 2018-10-29 PROCEDURE — 82948 REAGENT STRIP/BLOOD GLUCOSE: CPT

## 2018-10-29 PROCEDURE — 6370000000 HC RX 637 (ALT 250 FOR IP): Performed by: FAMILY MEDICINE

## 2018-10-29 PROCEDURE — 6370000000 HC RX 637 (ALT 250 FOR IP): Performed by: EMERGENCY MEDICINE

## 2018-10-29 PROCEDURE — 2140000000 HC CCU INTERMEDIATE R&B

## 2018-10-29 PROCEDURE — 84484 ASSAY OF TROPONIN QUANT: CPT

## 2018-10-29 PROCEDURE — 99285 EMERGENCY DEPT VISIT HI MDM: CPT

## 2018-10-29 PROCEDURE — 71046 X-RAY EXAM CHEST 2 VIEWS: CPT

## 2018-10-29 PROCEDURE — 99285 EMERGENCY DEPT VISIT HI MDM: CPT | Performed by: EMERGENCY MEDICINE

## 2018-10-29 PROCEDURE — 83880 ASSAY OF NATRIURETIC PEPTIDE: CPT

## 2018-10-29 PROCEDURE — 2500000003 HC RX 250 WO HCPCS: Performed by: EMERGENCY MEDICINE

## 2018-10-29 PROCEDURE — 36415 COLL VENOUS BLD VENIPUNCTURE: CPT

## 2018-10-29 PROCEDURE — 85025 COMPLETE CBC W/AUTO DIFF WBC: CPT

## 2018-10-29 RX ORDER — LEVETIRACETAM 250 MG/1
250 TABLET ORAL 2 TIMES DAILY
Status: DISCONTINUED | OUTPATIENT
Start: 2018-10-29 | End: 2018-11-05 | Stop reason: HOSPADM

## 2018-10-29 RX ORDER — DEXTROSE MONOHYDRATE 25 G/50ML
12.5 INJECTION, SOLUTION INTRAVENOUS PRN
Status: DISCONTINUED | OUTPATIENT
Start: 2018-10-29 | End: 2018-11-05 | Stop reason: HOSPADM

## 2018-10-29 RX ORDER — ESCITALOPRAM OXALATE 10 MG/1
20 TABLET ORAL DAILY
Status: DISCONTINUED | OUTPATIENT
Start: 2018-10-30 | End: 2018-11-05 | Stop reason: HOSPADM

## 2018-10-29 RX ORDER — NITROGLYCERIN 0.4 MG/1
0.4 TABLET SUBLINGUAL EVERY 5 MIN PRN
Status: DISCONTINUED | OUTPATIENT
Start: 2018-10-29 | End: 2018-11-05 | Stop reason: HOSPADM

## 2018-10-29 RX ORDER — SIMVASTATIN 40 MG
40 TABLET ORAL NIGHTLY
Status: DISCONTINUED | OUTPATIENT
Start: 2018-10-29 | End: 2018-11-02

## 2018-10-29 RX ORDER — ASPIRIN 81 MG/1
81 TABLET ORAL DAILY
Status: DISCONTINUED | OUTPATIENT
Start: 2018-10-30 | End: 2018-11-05 | Stop reason: HOSPADM

## 2018-10-29 RX ORDER — NICOTINE POLACRILEX 4 MG
15 LOZENGE BUCCAL PRN
Status: DISCONTINUED | OUTPATIENT
Start: 2018-10-29 | End: 2018-11-05 | Stop reason: HOSPADM

## 2018-10-29 RX ORDER — HYDRALAZINE HYDROCHLORIDE 20 MG/ML
10 INJECTION INTRAMUSCULAR; INTRAVENOUS EVERY 6 HOURS PRN
Status: DISCONTINUED | OUTPATIENT
Start: 2018-10-29 | End: 2018-11-05 | Stop reason: HOSPADM

## 2018-10-29 RX ORDER — INSULIN GLARGINE 100 [IU]/ML
25 INJECTION, SOLUTION SUBCUTANEOUS NIGHTLY
Status: DISCONTINUED | OUTPATIENT
Start: 2018-10-29 | End: 2018-11-05 | Stop reason: HOSPADM

## 2018-10-29 RX ORDER — LABETALOL 20 MG/4 ML (5 MG/ML) INTRAVENOUS SYRINGE
10 ONCE
Status: COMPLETED | OUTPATIENT
Start: 2018-10-29 | End: 2018-10-29

## 2018-10-29 RX ORDER — NIFEDIPINE 30 MG/1
30 TABLET, EXTENDED RELEASE ORAL 2 TIMES DAILY
Status: DISCONTINUED | OUTPATIENT
Start: 2018-10-30 | End: 2018-10-30

## 2018-10-29 RX ORDER — DEXTROSE MONOHYDRATE 50 MG/ML
100 INJECTION, SOLUTION INTRAVENOUS PRN
Status: DISCONTINUED | OUTPATIENT
Start: 2018-10-29 | End: 2018-11-05 | Stop reason: HOSPADM

## 2018-10-29 RX ORDER — CLOPIDOGREL BISULFATE 75 MG/1
75 TABLET ORAL DAILY
Status: DISCONTINUED | OUTPATIENT
Start: 2018-10-30 | End: 2018-11-05 | Stop reason: HOSPADM

## 2018-10-29 RX ORDER — DOXEPIN HYDROCHLORIDE 10 MG/1
10 CAPSULE ORAL NIGHTLY
Status: DISCONTINUED | OUTPATIENT
Start: 2018-10-29 | End: 2018-11-05 | Stop reason: HOSPADM

## 2018-10-29 RX ORDER — LISINOPRIL 10 MG/1
10 TABLET ORAL ONCE
Status: COMPLETED | OUTPATIENT
Start: 2018-10-29 | End: 2018-10-29

## 2018-10-29 RX ORDER — LISINOPRIL 20 MG/1
20 TABLET ORAL 2 TIMES DAILY
Status: DISCONTINUED | OUTPATIENT
Start: 2018-10-29 | End: 2018-11-01

## 2018-10-29 RX ORDER — ISOSORBIDE MONONITRATE 30 MG/1
30 TABLET, EXTENDED RELEASE ORAL DAILY
Status: DISCONTINUED | OUTPATIENT
Start: 2018-10-29 | End: 2018-11-05 | Stop reason: HOSPADM

## 2018-10-29 RX ORDER — ASPIRIN 325 MG
325 TABLET ORAL ONCE
Status: COMPLETED | OUTPATIENT
Start: 2018-10-29 | End: 2018-10-29

## 2018-10-29 RX ADMIN — LABETALOL 20 MG/4 ML (5 MG/ML) INTRAVENOUS SYRINGE 10 MG: at 10:42

## 2018-10-29 RX ADMIN — DOXEPIN HYDROCHLORIDE 10 MG: 10 CAPSULE ORAL at 20:52

## 2018-10-29 RX ADMIN — LISINOPRIL 10 MG: 10 TABLET ORAL at 10:43

## 2018-10-29 RX ADMIN — ISOSORBIDE MONONITRATE 30 MG: 30 TABLET, EXTENDED RELEASE ORAL at 10:42

## 2018-10-29 RX ADMIN — SIMVASTATIN 40 MG: 40 TABLET, FILM COATED ORAL at 20:52

## 2018-10-29 RX ADMIN — LISINOPRIL 20 MG: 20 TABLET ORAL at 17:37

## 2018-10-29 RX ADMIN — INSULIN LISPRO 2 UNITS: 100 INJECTION, SOLUTION INTRAVENOUS; SUBCUTANEOUS at 20:53

## 2018-10-29 RX ADMIN — INSULIN GLARGINE 25 UNITS: 100 INJECTION, SOLUTION SUBCUTANEOUS at 20:52

## 2018-10-29 RX ADMIN — LEVETIRACETAM 250 MG: 250 TABLET, FILM COATED ORAL at 20:52

## 2018-10-29 RX ADMIN — ASPIRIN 325 MG ORAL TABLET 325 MG: 325 PILL ORAL at 10:42

## 2018-10-29 RX ADMIN — INSULIN LISPRO 4 UNITS: 100 INJECTION, SOLUTION INTRAVENOUS; SUBCUTANEOUS at 17:38

## 2018-10-29 ASSESSMENT — ENCOUNTER SYMPTOMS
RHINORRHEA: 0
EYE PAIN: 0
COLOR CHANGE: 0
SHORTNESS OF BREATH: 0
PHOTOPHOBIA: 0
CHOKING: 0
APNEA: 0
NAUSEA: 0
WHEEZING: 0
SORE THROAT: 0
ABDOMINAL DISTENTION: 0
ABDOMINAL PAIN: 0
BACK PAIN: 0
COUGH: 0
EYE DISCHARGE: 0

## 2018-10-29 ASSESSMENT — PAIN SCALES - GENERAL
PAINLEVEL_OUTOF10: 0

## 2018-10-29 NOTE — ED NOTES
Pt placed on stretcher, side rails x 2 raised, call light given, instructed pt to call before attempting to ambulate      Diana Ramirez RN  10/29/18 9838

## 2018-10-29 NOTE — ED PROVIDER NOTES
[address] [facility] EMERGENCY DEPT  eMERGENCYdEPARTMENT eNCOUnter      Pt Name: Maycol Vieira  MRN: [id number]  [place] 1940  Date of evaluation: 10/29/2018  Provider:ALEXI Riley    CHIEF COMPLAINT       Chief Complaint   Patient presents with    Chest Pain     presents with c/o chest pain         HISTORY OF PRESENT ILLNESS  (Location/Symptom, Timing/Onset, Context/Setting, Quality, Duration, Modifying Factors, Severity.)   Maycol Vieira is a [de-identified] y.o. female who presents to the emergency department With chief complaint of chest pain. Patient has a prior cardiac history to infarcts and one stent placement over 2 years ago per patient and son who is present in the room. The patient states she woke up this morning at 8 AM and had chest pain to her lateral to her left breast.  This is any type of pain that she has experienced. Her 2 prior heart attacks she experienced back pain specific to the left scapula. The patient admits that the pain did go down her left arm it was sharp stabbing in character. She took sublingual nitro and came in. The pain has since resolved. She admits to a shortness of breath. She was taking metoprolol that was discontinued when she was seen 2 weeks ago for a syncopal episode. There was concern that the metoprolol was creating bradycardia. She has a pulse of 72 when working up the HPI. Systolic in the 386W by the automatic BP cuff. Denies any fever or any congestion or cough denies any belly pain or changes in her stool. Denies lightheadedness or syncope or fatigue. She sees Dr. Nelson Sesay. HPI    Nursing Notes were reviewed and I agree. REVIEW OF SYSTEMS    (2-9 systems for level 4, 10 or more for level 5)     Review of Systems   Constitutional: Negative for activity change, appetite change, chills and fever. HENT: Negative for congestion, postnasal drip, rhinorrhea and sore throat. Eyes: Negative for photophobia, pain, discharge and visual disturbance.    Respiratory: Negative for apnea, cough, choking, shortness of breath and wheezing. Cardiovascular: Negative for chest pain and leg swelling. Gastrointestinal: Negative for abdominal distention, abdominal pain and nausea. Genitourinary: Negative for vaginal bleeding. Musculoskeletal: Negative for arthralgias, back pain, joint swelling, neck pain and neck stiffness. Skin: Negative for color change and rash. Neurological: Negative for dizziness, syncope, facial asymmetry and headaches. Hematological: Negative for adenopathy. Does not bruise/bleed easily. Psychiatric/Behavioral: Negative for agitation, behavioral problems and confusion. Except as noted above the remainder of the review of systems was reviewed and negative. PAST MEDICAL HISTORY     Past Medical History:   Diagnosis Date    [facility]     CAD (coronary artery disease), native coronary artery     Carotid artery stenosis 2014    [facility] cancer ([facility]     Diabetes mellitus type II     Hyperlipidemia     Cholesterol management per Cristy Camara M.D.    Hypertension     S/P cardiac cath 11    Syncope 14    s/p loop recorder          SURGICAL HISTORY       Past Surgical History:   Procedure Laterality Date    APPENDECTOMY      CATARACT REMOVAL Left 10/2015     SECTION      CHOLECYSTECTOMY      CORONARY ARTERY BYPASS GRAFT  [id number]    LIMA to LAD (patent), SVG to intermediate branch (occluded); SVG to LAD diagonal (ocluded); SVG to RCA (subtotally occluded-stent placement )    DIAGNOSTIC CARDIAC CATH LAB PROCEDURE  878321    EF  60%    DIAGNOSTIC CARDIAC CATH LAB PROCEDURE  443069    EF 60%    DIAGNOSTIC CARDIAC CATH LAB PROCEDURE  00    EF 50%    MASTECTOMY       Right Breast    VASCULAR SURGERY  [facility]    Left carotid endarterectomy with vascular heart patch repair. Left cervical carotid arteriograms after endarterectomy.     VASCULAR SURGERY  [facility]    Right carotid endarterectomy with vascular patch repair. Right cervical carotid arteriograms after endarterectomy. CURRENT MEDICATIONS       Previous Medications    ASPIRIN EC 81 MG EC TABLET    Take 1 tablet by mouth daily    CHOLECALCIFEROL (VITAMIN D3) 5000 UNITS TABS    Take by mouth    CLOPIDOGREL (PLAVIX) 75 MG TABLET    Take 75 mg by mouth daily    DOXEPIN (SINEQUAN) 10 MG CAPSULE    Take 10 mg by mouth nightly    ESCITALOPRAM (LEXAPRO) 10 MG TABLET    Take 20 mg by mouth daily     INSULIN GLARGINE (BASAGLAR KWIKPEN SC)    Inject 25 Units into the skin nightly     ISOSORBIDE MONONITRATE (IMDUR) 30 MG CR TABLET    Take 30 mg by mouth daily. LEVETIRACETAM (KEPPRA) 250 MG TABLET    Take 1 tablet by mouth 2 times daily    LISINOPRIL (PRINIVIL;ZESTRIL) 20 MG TABLET    Take 10 mg by mouth Daily     NITROGLYCERIN (NITROSTAT) 0.4 MG SL TABLET    Place 1 tablet under the tongue every 5 minutes as needed    SIMVASTATIN (ZOCOR) 40 MG TABLET    Take 40 mg by mouth nightly     SITAGLIPTIN (JANUVIA) 50 MG TABLET    Take 0.5 tablets by mouth daily       ALLERGIES     Tetanus toxoids    FAMILY HISTORY       Family History   Problem Relation Age of Onset    Coronary Art Dis Mother     Diabetes Mother     Hypertension Mother     Cancer Mother     Cancer Sister     Coronary Art Dis Brother     Cancer Brother     Coronary Art Dis Maternal Grandmother     Coronary Art Dis Maternal Grandfather     Diabetes Daughter           SOCIAL HISTORY       Social History     Social History    Marital status:      Spouse name: N/A    Number of children: N/A    Years of education: N/A     Social History Main Topics    Smoking status: Former Smoker    Smokeless tobacco: Never Used      Comment: quit around 1976.     Alcohol use No    Drug use: No    Sexual activity: Not Asked     Other Topics Concern    None     Social History Narrative    None       SCREENINGS           PHYSICAL EXAM    (up to 7 forlevel 4, 8 or more for level 5) the anterior   left chest wall. IMPRESSION:    1. No radiographic evidence of acute cardiopulmonary process. .   Signed by Dr Indigo Sheikh on 10/29/2018 10:40 AM          LABS:  Labs Reviewed   COMPREHENSIVE METABOLIC PANEL - Abnormal; Notable for the following:        Result Value    Glucose 240 (*)     CREATININE 1.0 (*)     GFR Non- 53 (*)     All other components within normal limits   LIPASE - Abnormal; Notable for the following:     Lipase 129 (*)     All other components within normal limits   TROPONIN   BRAIN NATRIURETIC PEPTIDE   CBC WITH AUTO DIFFERENTIAL   TROPONIN   POCT VENOUS       All other labs were within normal range or notreturned as of this dictation. RE-ASSESSMENT          EMERGENCY DEPARTMENT COURSE and DIFFERENTIAL DIAGNOSIS/MDM:   Vitals:    Vitals:    10/29/18 0956 10/29/18 1043 10/29/18 1112   BP: (!) 178/90 (!) 236/76 (!) 180/72   Pulse: 89  70   Resp: 20  20   Temp: 98.2 °F (36.8 °C)     SpO2: 92%  92%   Weight: 150 lb (68 kg)     Height: 5' 5\" (1.651 m)       Benign EKG per Dr. Cierra Monique with no st segment elevation, no t wave abnormalities. Normal sinus rhythm. MDM  Number of Diagnoses or Management Options  Diagnosis management comments: Decision to admit this patient. Dr. Cierra Monique spoke with Dr. Loreto Mccauley who agreed to admit the patient. At 1135 blood pressure is 176/66. Patient continues to do well after getting labetalol and nitrate. O2 sats and pulse remained within normal limits throughout stay. Clinical diagnosis of unstable angina. PROCEDURES:    Procedures      FINAL IMPRESSION      1. Coronary artery disease involving native heart with unstable angina pectoris, unspecified vessel or lesion type ([address]    2. Hypertensive urgency          DISPOSITION/PLAN   DISPOSITION Decision To Admit 10/29/2018 11:17:49 AM      PATIENT REFERRED TO:  No follow-up provider specified.     DISCHARGE MEDICATIONS:  New Prescriptions    No medications on file       (Please

## 2018-10-30 LAB
ANION GAP SERPL CALCULATED.3IONS-SCNC: 13 MMOL/L (ref 7–19)
BUN BLDV-MCNC: 12 MG/DL (ref 8–23)
CALCIUM SERPL-MCNC: 9 MG/DL (ref 8.8–10.2)
CHLORIDE BLD-SCNC: 101 MMOL/L (ref 98–111)
CO2: 27 MMOL/L (ref 22–29)
CREAT SERPL-MCNC: 1.2 MG/DL (ref 0.5–0.9)
EKG P AXIS: 71 DEGREES
EKG P AXIS: 73 DEGREES
EKG P-R INTERVAL: 164 MS
EKG P-R INTERVAL: 164 MS
EKG Q-T INTERVAL: 394 MS
EKG Q-T INTERVAL: 446 MS
EKG QRS DURATION: 90 MS
EKG QRS DURATION: 94 MS
EKG QTC CALCULATION (BAZETT): 429 MS
EKG QTC CALCULATION (BAZETT): 449 MS
EKG T AXIS: 118 DEGREES
EKG T AXIS: 162 DEGREES
GFR NON-AFRICAN AMERICAN: 43
GLUCOSE BLD-MCNC: 147 MG/DL (ref 74–109)
GLUCOSE BLD-MCNC: 176 MG/DL (ref 70–99)
GLUCOSE BLD-MCNC: 186 MG/DL (ref 70–99)
GLUCOSE BLD-MCNC: 261 MG/DL (ref 70–99)
GLUCOSE BLD-MCNC: 294 MG/DL (ref 70–99)
LV EF: 58 %
LVEF MODALITY: NORMAL
PERFORMED ON: ABNORMAL
POTASSIUM SERPL-SCNC: 3.9 MMOL/L (ref 3.5–5)
SODIUM BLD-SCNC: 141 MMOL/L (ref 136–145)

## 2018-10-30 PROCEDURE — 93306 TTE W/DOPPLER COMPLETE: CPT

## 2018-10-30 PROCEDURE — 0298T PR EXT ECG > 48HR TO 21 DAY REVIEW AND INTERPRETATN: CPT | Performed by: INTERNAL MEDICINE

## 2018-10-30 PROCEDURE — 80048 BASIC METABOLIC PNL TOTAL CA: CPT

## 2018-10-30 PROCEDURE — 6370000000 HC RX 637 (ALT 250 FOR IP): Performed by: EMERGENCY MEDICINE

## 2018-10-30 PROCEDURE — 36415 COLL VENOUS BLD VENIPUNCTURE: CPT

## 2018-10-30 PROCEDURE — 2140000000 HC CCU INTERMEDIATE R&B

## 2018-10-30 PROCEDURE — 82948 REAGENT STRIP/BLOOD GLUCOSE: CPT

## 2018-10-30 PROCEDURE — 6370000000 HC RX 637 (ALT 250 FOR IP): Performed by: FAMILY MEDICINE

## 2018-10-30 RX ADMIN — LEVETIRACETAM 250 MG: 250 TABLET, FILM COATED ORAL at 08:47

## 2018-10-30 RX ADMIN — INSULIN LISPRO 6 UNITS: 100 INJECTION, SOLUTION INTRAVENOUS; SUBCUTANEOUS at 11:44

## 2018-10-30 RX ADMIN — INSULIN GLARGINE 25 UNITS: 100 INJECTION, SOLUTION SUBCUTANEOUS at 20:48

## 2018-10-30 RX ADMIN — DOXEPIN HYDROCHLORIDE 10 MG: 10 CAPSULE ORAL at 20:48

## 2018-10-30 RX ADMIN — INSULIN LISPRO 3 UNITS: 100 INJECTION, SOLUTION INTRAVENOUS; SUBCUTANEOUS at 20:49

## 2018-10-30 RX ADMIN — SIMVASTATIN 40 MG: 40 TABLET, FILM COATED ORAL at 20:48

## 2018-10-30 RX ADMIN — LEVETIRACETAM 250 MG: 250 TABLET, FILM COATED ORAL at 20:48

## 2018-10-30 RX ADMIN — CLOPIDOGREL BISULFATE 75 MG: 75 TABLET ORAL at 08:47

## 2018-10-30 RX ADMIN — ASPIRIN 81 MG: 81 TABLET, COATED ORAL at 08:47

## 2018-10-30 RX ADMIN — ISOSORBIDE MONONITRATE 30 MG: 30 TABLET, EXTENDED RELEASE ORAL at 08:47

## 2018-10-30 RX ADMIN — LINAGLIPTIN 5 MG: 5 TABLET, FILM COATED ORAL at 08:47

## 2018-10-30 RX ADMIN — INSULIN LISPRO 2 UNITS: 100 INJECTION, SOLUTION INTRAVENOUS; SUBCUTANEOUS at 17:47

## 2018-10-30 RX ADMIN — ESCITALOPRAM OXALATE 20 MG: 10 TABLET ORAL at 08:47

## 2018-10-30 RX ADMIN — INSULIN LISPRO 2 UNITS: 100 INJECTION, SOLUTION INTRAVENOUS; SUBCUTANEOUS at 08:50

## 2018-10-30 ASSESSMENT — PAIN SCALES - GENERAL
PAINLEVEL_OUTOF10: 0

## 2018-10-30 NOTE — PROGRESS NOTES
Pt received medication and is resting. No complaints at this time. Will continue to monitor.  Electronically signed by Nadira Ruelas RN on 10/29/2018 at 11:57 PM

## 2018-10-30 NOTE — CONSULTS
Problem Relation Age of Onset    Coronary Art Dis Mother     Diabetes Mother     Hypertension Mother     Cancer Mother     Cancer Sister     Coronary Art Dis Brother     Cancer Brother     Coronary Art Dis Maternal Grandmother     Coronary Art Dis Maternal Grandfather     Diabetes Daughter          REVIEW OF SYSTEMS:     Except as noted in the HPI, all other systems are negative        PHYSICAL EXAMINATION:     BP (!) 90/56   Pulse 77   Temp 97.2 °F (36.2 °C) (Temporal)   Resp 16   Ht 5' 5\" (1.651 m)   Wt 135 lb 6.4 oz (61.4 kg)   SpO2 95%   BMI 22.53 kg/m²     GENERAL - well developed and well nourished, in no amount of generalized distress; is an active participant in this examination  HEENT -  PERRLA, Hearing appears normal, conjunctiva and lids are normal, ears and nose appear normal  NECK - no thyromegaly, no JVD, trachea is in the midline  CARDIOVASCULAR - PMI is in the left mid line clavicular position, Normal S1 and S2 with no systolic murmur. No S3 or S4    PULMONARY - No respiratory distress. no wheezes and rales.   Breath sounds in both  lung fields are Normal  ABDOMEN  - soft, non tender, no rebound, no hepatomegaly or splenomegaly  MUSCULOSKELETAL  - Sitting, digitals and nails are without clubbing or cyanosis  EXTREMITIES - no  edema  NEUROLOGIC - cranial nerves, II-XII, are normal  SKIN - turgor is normal, no rash  PSYCHIATRIC - normal mood and affect, alert and orientated x 3, judgement and insight appear appropriate      LABORATORY EVALUATION & TESTING:    I have personally reviewed and interpreted the results of the following diagnostic testing      EKG and or Telemetry:  which was personally reviewed me:  Sinus rhythm, 77 bpm,  without Acute changes    Troponin:  negative myocardial necrosis (  0.00); the creatinine is abnormal 1.2    CBC:   Recent Labs      10/29/18   1002   WBC  7.4   HGB  14.9   HCT  44.6   MCV  85.6   PLT  210     BMP:   Recent Labs      10/29/18   1002 mediastinal surgery. Device implanted within the anterior  left chest wall. IMPRESSION:   1. No radiographic evidence of acute cardiopulmonary process. . Yes: 2D echo today. Lena Gore tomorrow. Continue current medications:     Yes:            2. CAD Initial presentation during this evaluation   Review and summation of old records:    2015 DSE - negative  2011 CATH - LIMA to LAD (patent), SVG to intermediate branch (occluded); SVG to LAD diagonal (ocluded); SVG to RCA (subtotally occluded-stent placement )   Yes: Lexiscan tomorrow  Continue current medications:    Yes:            3. Systemic arterial hypertension Initial presentation during this evaluation Systolic (94ZUJ), LEC:885 , Min:90 , CTK:805    Diastolic (79ZBT), QII:70, Min:50, Max:90      Currently hypotensive. No Will need to monitor blood pressure and if needed would recommend Norvasc and not a beta blocker due to her bradycardia. PLAN:    1. Continue present medications except for changes as noted above  2. Continue to monitor rhythm  3. Further orders per clinical course. 4. 2D echo, Lena Gore in the morning. Discussed with patient and nursing. I greatly appreciate the opportunity and your confidence in allowing me to participate in the care of Danielle Morgan    Electronically signed by LELE Fang NP on 10/30/18     [id number] [facility]    ______________________________________________________________________  I personally saw the patient and rounded with:  Olive Melendez have independently interviewed and examined Danielle Morgan. I have discussed key elements of the care plan with the APRN and I agree with the findings and care plan as stated above unless otherwise noted.       Cardiac Specific Problems:    Specialty Problems        Cardiology Problems    Coronary atherosclerosis of artery bypass graft        Coronary atherosclerosis of native coronary artery        Essential hypertension, cardiolyte, tomorrow  Yes: as per the lexiscan Continue current medications:     Yes:            2. CAD Initial presentation during this evaluation   Review and summation of old records:    13 DSE - negative  2011 CATH - LIMA to LAD (patent), SVG to intermediate branch (occluded); SVG to LAD diagonal (ocluded); SVG to RCA (subtotally occluded-stent placement ) Yes: as per the lexiscan Continue current medications:    Yes:            3. Systemic arterial hypertension Initial presentation during this evaluation Systolic (78JZS), FBJ:596 , Min:99 , OIV:812    Diastolic (67DJX), YWA:50, Min:52, Max:74   No Continue current medications:       Yes:        PLAN:    1. Continue present medications except for changes as noted above  2. Continue to monitor rhythm  3. Further orders per clinical course. 4. lexiscan / cardiolyte, tomorrow   5. I have discussed the risks, benefits and options with the patient and her family. They appear to understand, have no questions, and wish to proceed. This procedure is scheduled for tomorrow. Discussed with patient and nursing.     Electronically signed by Demi Cabral MD on 11/2/18    [facility]

## 2018-10-31 ENCOUNTER — APPOINTMENT (OUTPATIENT)
Dept: NUCLEAR MEDICINE | Age: 78
DRG: 249 | End: 2018-10-31
Payer: MEDICARE

## 2018-10-31 LAB
GLUCOSE BLD-MCNC: 123 MG/DL (ref 70–99)
GLUCOSE BLD-MCNC: 142 MG/DL (ref 70–99)
GLUCOSE BLD-MCNC: 173 MG/DL (ref 70–99)
GLUCOSE BLD-MCNC: 256 MG/DL (ref 70–99)
PERFORMED ON: ABNORMAL

## 2018-10-31 PROCEDURE — 93017 CV STRESS TEST TRACING ONLY: CPT

## 2018-10-31 PROCEDURE — A9500 TC99M SESTAMIBI: HCPCS | Performed by: INTERNAL MEDICINE

## 2018-10-31 PROCEDURE — 82948 REAGENT STRIP/BLOOD GLUCOSE: CPT

## 2018-10-31 PROCEDURE — 2140000000 HC CCU INTERMEDIATE R&B

## 2018-10-31 PROCEDURE — 78452 HT MUSCLE IMAGE SPECT MULT: CPT

## 2018-10-31 PROCEDURE — 6360000002 HC RX W HCPCS: Performed by: INTERNAL MEDICINE

## 2018-10-31 PROCEDURE — 6370000000 HC RX 637 (ALT 250 FOR IP): Performed by: FAMILY MEDICINE

## 2018-10-31 PROCEDURE — 3430000000 HC RX DIAGNOSTIC RADIOPHARMACEUTICAL: Performed by: INTERNAL MEDICINE

## 2018-10-31 PROCEDURE — 6360000002 HC RX W HCPCS: Performed by: FAMILY MEDICINE

## 2018-10-31 PROCEDURE — 6370000000 HC RX 637 (ALT 250 FOR IP): Performed by: EMERGENCY MEDICINE

## 2018-10-31 RX ORDER — SODIUM CHLORIDE 9 MG/ML
INJECTION, SOLUTION INTRAVENOUS CONTINUOUS
Status: DISCONTINUED | OUTPATIENT
Start: 2018-11-01 | End: 2018-11-03

## 2018-10-31 RX ORDER — SODIUM CHLORIDE 0.9 % (FLUSH) 0.9 %
10 SYRINGE (ML) INJECTION EVERY 12 HOURS SCHEDULED
Status: DISCONTINUED | OUTPATIENT
Start: 2018-11-01 | End: 2018-11-03 | Stop reason: SDUPTHER

## 2018-10-31 RX ORDER — SODIUM CHLORIDE 0.9 % (FLUSH) 0.9 %
10 SYRINGE (ML) INJECTION PRN
Status: DISCONTINUED | OUTPATIENT
Start: 2018-11-01 | End: 2018-11-03 | Stop reason: SDUPTHER

## 2018-10-31 RX ADMIN — REGADENOSON 0.4 MG: 0.08 INJECTION, SOLUTION INTRAVENOUS at 10:38

## 2018-10-31 RX ADMIN — ISOSORBIDE MONONITRATE 30 MG: 30 TABLET, EXTENDED RELEASE ORAL at 10:04

## 2018-10-31 RX ADMIN — CLOPIDOGREL BISULFATE 75 MG: 75 TABLET ORAL at 10:04

## 2018-10-31 RX ADMIN — ASPIRIN 81 MG: 81 TABLET, COATED ORAL at 10:04

## 2018-10-31 RX ADMIN — ENOXAPARIN SODIUM 40 MG: 40 INJECTION SUBCUTANEOUS at 10:04

## 2018-10-31 RX ADMIN — LINAGLIPTIN 5 MG: 5 TABLET, FILM COATED ORAL at 10:05

## 2018-10-31 RX ADMIN — TETRAKIS(2-METHOXYISOBUTYLISOCYANIDE)COPPER(I) TETRAFLUOROBORATE 30 MILLICURIE: 1 INJECTION, POWDER, LYOPHILIZED, FOR SOLUTION INTRAVENOUS at 10:38

## 2018-10-31 RX ADMIN — ESCITALOPRAM OXALATE 20 MG: 10 TABLET ORAL at 10:05

## 2018-10-31 RX ADMIN — DOXEPIN HYDROCHLORIDE 10 MG: 10 CAPSULE ORAL at 21:12

## 2018-10-31 RX ADMIN — SIMVASTATIN 40 MG: 40 TABLET, FILM COATED ORAL at 21:12

## 2018-10-31 RX ADMIN — TETRAKIS(2-METHOXYISOBUTYLISOCYANIDE)COPPER(I) TETRAFLUOROBORATE 10 MILLICURIE: 1 INJECTION, POWDER, LYOPHILIZED, FOR SOLUTION INTRAVENOUS at 10:37

## 2018-10-31 RX ADMIN — INSULIN LISPRO 6 UNITS: 100 INJECTION, SOLUTION INTRAVENOUS; SUBCUTANEOUS at 18:11

## 2018-10-31 RX ADMIN — INSULIN GLARGINE 25 UNITS: 100 INJECTION, SOLUTION SUBCUTANEOUS at 21:12

## 2018-10-31 RX ADMIN — LEVETIRACETAM 250 MG: 250 TABLET, FILM COATED ORAL at 21:12

## 2018-10-31 RX ADMIN — LEVETIRACETAM 250 MG: 250 TABLET, FILM COATED ORAL at 10:05

## 2018-10-31 ASSESSMENT — PAIN SCALES - GENERAL
PAINLEVEL_OUTOF10: 0

## 2018-10-31 NOTE — PROGRESS NOTES
sub-Q daily. Discharge planning: ? Active Problems:    Angina pectoris ([address]  Resolved Problems:    * No resolved hospital problems.  Chasidy Howard MD

## 2018-11-01 LAB
ANION GAP SERPL CALCULATED.3IONS-SCNC: 14 MMOL/L (ref 7–19)
BUN BLDV-MCNC: 29 MG/DL (ref 8–23)
CALCIUM SERPL-MCNC: 8.9 MG/DL (ref 8.8–10.2)
CHLORIDE BLD-SCNC: 101 MMOL/L (ref 98–111)
CO2: 23 MMOL/L (ref 22–29)
CREAT SERPL-MCNC: 1.9 MG/DL (ref 0.5–0.9)
EKG P AXIS: 83 DEGREES
EKG P-R INTERVAL: 190 MS
EKG Q-T INTERVAL: 422 MS
EKG QRS DURATION: 98 MS
EKG QTC CALCULATION (BAZETT): 422 MS
EKG T AXIS: 133 DEGREES
GFR NON-AFRICAN AMERICAN: 25
GLUCOSE BLD-MCNC: 110 MG/DL (ref 74–109)
GLUCOSE BLD-MCNC: 122 MG/DL (ref 70–99)
GLUCOSE BLD-MCNC: 160 MG/DL (ref 70–99)
GLUCOSE BLD-MCNC: 274 MG/DL (ref 70–99)
GLUCOSE BLD-MCNC: 97 MG/DL (ref 70–99)
HCT VFR BLD CALC: 34.7 % (ref 37–47)
HEMOGLOBIN: 11.6 G/DL (ref 12–16)
LV EF: 64 %
LVEF MODALITY: NORMAL
MCH RBC QN AUTO: 29.2 PG (ref 27–31)
MCHC RBC AUTO-ENTMCNC: 33.4 G/DL (ref 33–37)
MCV RBC AUTO: 87.4 FL (ref 81–99)
PDW BLD-RTO: 12.4 % (ref 11.5–14.5)
PERFORMED ON: ABNORMAL
PERFORMED ON: NORMAL
PLATELET # BLD: 170 K/UL (ref 130–400)
PMV BLD AUTO: 10.4 FL (ref 9.4–12.3)
POTASSIUM SERPL-SCNC: 4.5 MMOL/L (ref 3.5–5)
RBC # BLD: 3.97 M/UL (ref 4.2–5.4)
SODIUM BLD-SCNC: 138 MMOL/L (ref 136–145)
WBC # BLD: 6.1 K/UL (ref 4.8–10.8)

## 2018-11-01 PROCEDURE — 93005 ELECTROCARDIOGRAM TRACING: CPT

## 2018-11-01 PROCEDURE — 85027 COMPLETE CBC AUTOMATED: CPT

## 2018-11-01 PROCEDURE — 82948 REAGENT STRIP/BLOOD GLUCOSE: CPT

## 2018-11-01 PROCEDURE — 2140000000 HC CCU INTERMEDIATE R&B

## 2018-11-01 PROCEDURE — 6360000002 HC RX W HCPCS: Performed by: FAMILY MEDICINE

## 2018-11-01 PROCEDURE — 6370000000 HC RX 637 (ALT 250 FOR IP): Performed by: FAMILY MEDICINE

## 2018-11-01 PROCEDURE — 80048 BASIC METABOLIC PNL TOTAL CA: CPT

## 2018-11-01 PROCEDURE — 36415 COLL VENOUS BLD VENIPUNCTURE: CPT

## 2018-11-01 PROCEDURE — 6370000000 HC RX 637 (ALT 250 FOR IP): Performed by: EMERGENCY MEDICINE

## 2018-11-01 PROCEDURE — 2580000003 HC RX 258: Performed by: INTERNAL MEDICINE

## 2018-11-01 RX ADMIN — ASPIRIN 81 MG: 81 TABLET, COATED ORAL at 09:28

## 2018-11-01 RX ADMIN — INSULIN LISPRO 6 UNITS: 100 INJECTION, SOLUTION INTRAVENOUS; SUBCUTANEOUS at 12:42

## 2018-11-01 RX ADMIN — CLOPIDOGREL BISULFATE 75 MG: 75 TABLET ORAL at 09:28

## 2018-11-01 RX ADMIN — ISOSORBIDE MONONITRATE 30 MG: 30 TABLET, EXTENDED RELEASE ORAL at 09:28

## 2018-11-01 RX ADMIN — LINAGLIPTIN 5 MG: 5 TABLET, FILM COATED ORAL at 09:28

## 2018-11-01 RX ADMIN — LEVETIRACETAM 250 MG: 250 TABLET, FILM COATED ORAL at 09:28

## 2018-11-01 RX ADMIN — INSULIN GLARGINE 25 UNITS: 100 INJECTION, SOLUTION SUBCUTANEOUS at 19:37

## 2018-11-01 RX ADMIN — LEVETIRACETAM 250 MG: 250 TABLET, FILM COATED ORAL at 19:35

## 2018-11-01 RX ADMIN — SIMVASTATIN 40 MG: 40 TABLET, FILM COATED ORAL at 19:35

## 2018-11-01 RX ADMIN — SODIUM CHLORIDE: 9 INJECTION, SOLUTION INTRAVENOUS at 05:26

## 2018-11-01 RX ADMIN — ESCITALOPRAM OXALATE 20 MG: 10 TABLET ORAL at 09:28

## 2018-11-01 RX ADMIN — DOXEPIN HYDROCHLORIDE 10 MG: 10 CAPSULE ORAL at 19:35

## 2018-11-01 RX ADMIN — ENOXAPARIN SODIUM 30 MG: 30 INJECTION SUBCUTANEOUS at 09:27

## 2018-11-01 ASSESSMENT — PAIN SCALES - GENERAL
PAINLEVEL_OUTOF10: 0

## 2018-11-01 NOTE — CONSULTS
%.    Intake/Output Summary (Last 24 hours) at 11/01/18 1001  Last data filed at 11/01/18 0902   Gross per 24 hour   Intake              660 ml   Output             1200 ml   Net             -540 ml     General: awake/alert   Chest:  clear to auscultation bilaterally without respiratory distress  CVS: regular rate and rhythm  Abdominal: soft, nontender, normal bowel sounds  Extremities: no cyanosis, tr ble edema  Skin: warm and dry without rash      Labs:  BMP: Recent Labs      10/29/18   1002  10/30/18   0300  11/01/18 0221   NA  141  141  138   K  4.2  3.9  4.5   CL  100  101  101   CO2  29  27  23   BUN  9  12  29*   CREATININE  1.0*  1.2*  1.9*   CALCIUM  9.9  9.0  8.9     CBC: Recent Labs      10/29/18   1002  11/01/18 0221   WBC  7.4  6.1   HGB  14.9  11.6*   HCT  44.6  34.7*   MCV  85.6  87.4   PLT  210  170     LIVER PROFILE:   Recent Labs      10/29/18   1002   AST  21   ALT  14   LIPASE  129*   BILITOT  0.7   ALKPHOS  93     PT/INR: No results for input(s): PROTIME, INR in the last 72 hours. APTT: No results for input(s): APTT in the last 72 hours. BNP:  No results for input(s): BNP in the last 72 hours. Ionized Calcium:No results for input(s): IONCA in the last 72 hours. Magnesium:No results for input(s): MG in the last 72 hours. Phosphorus:No results for input(s): PHOS in the last 72 hours. HgbA1C: No results for input(s): LABA1C in the last 72 hours. Hepatic: Recent Labs      10/29/18   1002   ALKPHOS  93   ALT  14   AST  21   PROT  7.8   BILITOT  0.7   LABALBU  4.5     Lactic Acid: No results for input(s): LACTA in the last 72 hours. Troponin: No results for input(s): CKTOTAL, CKMB, TROPONINT in the last 72 hours. ABGs: No results for input(s): PH, PCO2, PO2, HCO3, O2SAT in the last 72 hours. CRP:  No results for input(s): CRP in the last 72 hours. Sed Rate:  No results for input(s): SEDRATE in the last 72 hours. Cultures:   No results for input(s): CULTURE in the last 72 hours.   No

## 2018-11-01 NOTE — PROGRESS NOTES
Ok per nephrology to increase fluids to ns 125cc/hr. Will see  Labs in am per  for poss cath tomm. tgreeno rn

## 2018-11-01 NOTE — PROGRESS NOTES
atraumatic, no cyanosis or edema  Neurologic: No obvious focal neurologic deficits. Assessment and Plan:   1. CP: No cath today as her renal function is not good. Ask nephrology to see. 2. HTN: Better control    Advance Directive: Full Code  DVT prophylaxis with enoxaparin 40 mg sub-Q daily. Discharge planning: ? Active Problems:    Angina pectoris ([address]  Resolved Problems:    * No resolved hospital problems.  Pepito Moulton MD

## 2018-11-02 ENCOUNTER — APPOINTMENT (OUTPATIENT)
Dept: ULTRASOUND IMAGING | Age: 78
DRG: 249 | End: 2018-11-02
Payer: MEDICARE

## 2018-11-02 LAB
ANION GAP SERPL CALCULATED.3IONS-SCNC: 11 MMOL/L (ref 7–19)
BILIRUBIN URINE: NEGATIVE
BLOOD, URINE: NEGATIVE
BUN BLDV-MCNC: 22 MG/DL (ref 8–23)
CALCIUM SERPL-MCNC: 8.5 MG/DL (ref 8.8–10.2)
CHLORIDE BLD-SCNC: 107 MMOL/L (ref 98–111)
CLARITY: CLEAR
CO2: 24 MMOL/L (ref 22–29)
COLOR: YELLOW
CREAT SERPL-MCNC: 1.3 MG/DL (ref 0.5–0.9)
CREATININE URINE: 51.8 MG/DL (ref 4.2–622)
EOSINOPHIL,URINE: NORMAL
GFR NON-AFRICAN AMERICAN: 39
GLUCOSE BLD-MCNC: 105 MG/DL (ref 70–99)
GLUCOSE BLD-MCNC: 132 MG/DL (ref 70–99)
GLUCOSE BLD-MCNC: 138 MG/DL (ref 70–99)
GLUCOSE BLD-MCNC: 220 MG/DL (ref 74–109)
GLUCOSE BLD-MCNC: 91 MG/DL (ref 70–99)
GLUCOSE URINE: NEGATIVE MG/DL
KETONES, URINE: NEGATIVE MG/DL
LEUKOCYTE ESTERASE, URINE: NEGATIVE
MAGNESIUM: 2.1 MG/DL (ref 1.6–2.4)
NITRITE, URINE: NEGATIVE
PARATHYROID HORMONE INTACT: 55.5 PG/ML (ref 15–65)
PERFORMED ON: ABNORMAL
PERFORMED ON: NORMAL
PH UA: 5.5
PHOSPHORUS: 3.5 MG/DL (ref 2.5–4.5)
POTASSIUM SERPL-SCNC: 4.5 MMOL/L (ref 3.5–5)
PROTEIN PROTEIN: 5 MG/DL (ref 15–45)
PROTEIN UA: NEGATIVE MG/DL
SODIUM BLD-SCNC: 142 MMOL/L (ref 136–145)
SODIUM URINE: 91 MMOL/L
SPECIFIC GRAVITY UA: 1.03
UREA NITROGEN, UR: 307 MG/DL
URIC ACID, SERUM: 7 MG/DL (ref 2.4–5.7)
UROBILINOGEN, URINE: 0.2 E.U./DL
VITAMIN D 25-HYDROXY: 45 NG/ML

## 2018-11-02 PROCEDURE — C1760 CLOSURE DEV, VASC: HCPCS

## 2018-11-02 PROCEDURE — 84100 ASSAY OF PHOSPHORUS: CPT

## 2018-11-02 PROCEDURE — 83735 ASSAY OF MAGNESIUM: CPT

## 2018-11-02 PROCEDURE — C1876 STENT, NON-COA/NON-COV W/DEL: HCPCS

## 2018-11-02 PROCEDURE — 93459 L HRT ART/GRFT ANGIO: CPT | Performed by: INTERNAL MEDICINE

## 2018-11-02 PROCEDURE — 6370000000 HC RX 637 (ALT 250 FOR IP)

## 2018-11-02 PROCEDURE — 92928 PRQ TCAT PLMT NTRAC ST 1 LES: CPT | Performed by: INTERNAL MEDICINE

## 2018-11-02 PROCEDURE — 02703DZ DILATION OF CORONARY ARTERY, ONE ARTERY WITH INTRALUMINAL DEVICE, PERCUTANEOUS APPROACH: ICD-10-PCS | Performed by: INTERNAL MEDICINE

## 2018-11-02 PROCEDURE — 84550 ASSAY OF BLOOD/URIC ACID: CPT

## 2018-11-02 PROCEDURE — 2580000003 HC RX 258: Performed by: INTERNAL MEDICINE

## 2018-11-02 PROCEDURE — C1887 CATHETER, GUIDING: HCPCS

## 2018-11-02 PROCEDURE — 6360000002 HC RX W HCPCS: Performed by: FAMILY MEDICINE

## 2018-11-02 PROCEDURE — 36415 COLL VENOUS BLD VENIPUNCTURE: CPT

## 2018-11-02 PROCEDURE — 2709999900 HC NON-CHARGEABLE SUPPLY

## 2018-11-02 PROCEDURE — C1769 GUIDE WIRE: HCPCS

## 2018-11-02 PROCEDURE — 82948 REAGENT STRIP/BLOOD GLUCOSE: CPT

## 2018-11-02 PROCEDURE — B211YZZ FLUOROSCOPY OF MULTIPLE CORONARY ARTERIES USING OTHER CONTRAST: ICD-10-PCS | Performed by: INTERNAL MEDICINE

## 2018-11-02 PROCEDURE — 84300 ASSAY OF URINE SODIUM: CPT

## 2018-11-02 PROCEDURE — 4A023N7 MEASUREMENT OF CARDIAC SAMPLING AND PRESSURE, LEFT HEART, PERCUTANEOUS APPROACH: ICD-10-PCS | Performed by: INTERNAL MEDICINE

## 2018-11-02 PROCEDURE — 6370000000 HC RX 637 (ALT 250 FOR IP): Performed by: EMERGENCY MEDICINE

## 2018-11-02 PROCEDURE — B215YZZ FLUOROSCOPY OF LEFT HEART USING OTHER CONTRAST: ICD-10-PCS | Performed by: INTERNAL MEDICINE

## 2018-11-02 PROCEDURE — 80048 BASIC METABOLIC PNL TOTAL CA: CPT

## 2018-11-02 PROCEDURE — C1894 INTRO/SHEATH, NON-LASER: HCPCS

## 2018-11-02 PROCEDURE — 82306 VITAMIN D 25 HYDROXY: CPT

## 2018-11-02 PROCEDURE — 84540 ASSAY OF URINE/UREA-N: CPT

## 2018-11-02 PROCEDURE — 82570 ASSAY OF URINE CREATININE: CPT

## 2018-11-02 PROCEDURE — 93458 L HRT ARTERY/VENTRICLE ANGIO: CPT | Performed by: INTERNAL MEDICINE

## 2018-11-02 PROCEDURE — 2140000000 HC CCU INTERMEDIATE R&B

## 2018-11-02 PROCEDURE — 83970 ASSAY OF PARATHORMONE: CPT

## 2018-11-02 PROCEDURE — 6370000000 HC RX 637 (ALT 250 FOR IP): Performed by: FAMILY MEDICINE

## 2018-11-02 PROCEDURE — 6360000002 HC RX W HCPCS

## 2018-11-02 PROCEDURE — 76770 US EXAM ABDO BACK WALL COMP: CPT

## 2018-11-02 PROCEDURE — 87205 SMEAR GRAM STAIN: CPT

## 2018-11-02 PROCEDURE — 84156 ASSAY OF PROTEIN URINE: CPT

## 2018-11-02 PROCEDURE — 6370000000 HC RX 637 (ALT 250 FOR IP): Performed by: INTERNAL MEDICINE

## 2018-11-02 PROCEDURE — B41FYZZ FLUOROSCOPY OF RIGHT LOWER EXTREMITY ARTERIES USING OTHER CONTRAST: ICD-10-PCS | Performed by: INTERNAL MEDICINE

## 2018-11-02 PROCEDURE — 6360000002 HC RX W HCPCS: Performed by: INTERNAL MEDICINE

## 2018-11-02 PROCEDURE — 81003 URINALYSIS AUTO W/O SCOPE: CPT

## 2018-11-02 RX ORDER — METOPROLOL SUCCINATE 25 MG/1
25 TABLET, EXTENDED RELEASE ORAL DAILY
Status: DISCONTINUED | OUTPATIENT
Start: 2018-11-02 | End: 2018-11-05 | Stop reason: HOSPADM

## 2018-11-02 RX ORDER — SODIUM CHLORIDE 0.9 % (FLUSH) 0.9 %
10 SYRINGE (ML) INJECTION EVERY 12 HOURS SCHEDULED
Status: DISCONTINUED | OUTPATIENT
Start: 2018-11-02 | End: 2018-11-05 | Stop reason: HOSPADM

## 2018-11-02 RX ORDER — SODIUM CHLORIDE 0.9 % (FLUSH) 0.9 %
10 SYRINGE (ML) INJECTION PRN
Status: DISCONTINUED | OUTPATIENT
Start: 2018-11-02 | End: 2018-11-05 | Stop reason: HOSPADM

## 2018-11-02 RX ORDER — ACETAMINOPHEN 325 MG/1
650 TABLET ORAL EVERY 4 HOURS PRN
Status: DISCONTINUED | OUTPATIENT
Start: 2018-11-02 | End: 2018-11-05 | Stop reason: HOSPADM

## 2018-11-02 RX ORDER — SIMVASTATIN 40 MG
80 TABLET ORAL NIGHTLY
Qty: 30 TABLET | Refills: 5 | Status: SHIPPED | OUTPATIENT
Start: 2018-11-02

## 2018-11-02 RX ORDER — ATORVASTATIN CALCIUM 40 MG/1
40 TABLET, FILM COATED ORAL NIGHTLY
Status: DISCONTINUED | OUTPATIENT
Start: 2018-11-02 | End: 2018-11-05 | Stop reason: HOSPADM

## 2018-11-02 RX ORDER — METOPROLOL SUCCINATE 25 MG/1
25 TABLET, EXTENDED RELEASE ORAL DAILY
Qty: 30 TABLET | Refills: 5 | Status: SHIPPED | OUTPATIENT
Start: 2018-11-02 | End: 2019-01-01 | Stop reason: SDUPTHER

## 2018-11-02 RX ORDER — ONDANSETRON 2 MG/ML
4 INJECTION INTRAMUSCULAR; INTRAVENOUS EVERY 6 HOURS PRN
Status: DISCONTINUED | OUTPATIENT
Start: 2018-11-02 | End: 2018-11-05 | Stop reason: HOSPADM

## 2018-11-02 RX ADMIN — LEVETIRACETAM 250 MG: 250 TABLET, FILM COATED ORAL at 19:33

## 2018-11-02 RX ADMIN — SODIUM CHLORIDE: 9 INJECTION, SOLUTION INTRAVENOUS at 18:19

## 2018-11-02 RX ADMIN — METOPROLOL SUCCINATE 25 MG: 25 TABLET, EXTENDED RELEASE ORAL at 13:38

## 2018-11-02 RX ADMIN — BIVALIRUDIN 1 MG/KG/HR: 250 INJECTION, POWDER, LYOPHILIZED, FOR SOLUTION INTRAVENOUS at 13:11

## 2018-11-02 RX ADMIN — LINAGLIPTIN 5 MG: 5 TABLET, FILM COATED ORAL at 08:33

## 2018-11-02 RX ADMIN — ESCITALOPRAM OXALATE 20 MG: 10 TABLET ORAL at 08:33

## 2018-11-02 RX ADMIN — INSULIN GLARGINE 25 UNITS: 100 INJECTION, SOLUTION SUBCUTANEOUS at 19:35

## 2018-11-02 RX ADMIN — DOXEPIN HYDROCHLORIDE 10 MG: 10 CAPSULE ORAL at 19:33

## 2018-11-02 RX ADMIN — ISOSORBIDE MONONITRATE 30 MG: 30 TABLET, EXTENDED RELEASE ORAL at 08:33

## 2018-11-02 RX ADMIN — ASPIRIN 81 MG: 81 TABLET, COATED ORAL at 08:33

## 2018-11-02 RX ADMIN — HYDRALAZINE HYDROCHLORIDE 10 MG: 20 INJECTION INTRAMUSCULAR; INTRAVENOUS at 18:22

## 2018-11-02 RX ADMIN — CLOPIDOGREL BISULFATE 75 MG: 75 TABLET ORAL at 08:33

## 2018-11-02 RX ADMIN — ATORVASTATIN CALCIUM 40 MG: 40 TABLET, FILM COATED ORAL at 19:33

## 2018-11-02 RX ADMIN — LEVETIRACETAM 250 MG: 250 TABLET, FILM COATED ORAL at 08:33

## 2018-11-02 RX ADMIN — Medication 10 ML: at 08:32

## 2018-11-02 ASSESSMENT — PAIN SCALES - GENERAL: PAINLEVEL_OUTOF10: 0

## 2018-11-02 NOTE — PROGRESS NOTES
on the GFR and she probably can tolerate a left heart cath. I will see what Dr Zee Devries thinks. 2. Diabetic nephropahy with MANOJ on CKD3: Dr Zee Devries seeing her and she has improved. 3. HTN: Better control    Advance Directive: Full Code  DVT prophylaxis with enoxaparin 30 mg sub-Q daily. Discharge planning: ? Active Problems:    Angina pectoris ([address]  Resolved Problems:    * No resolved hospital problems.  Adair Palma MD

## 2018-11-02 NOTE — PROGRESS NOTES
Objective:    BP (!) 154/69   Pulse 70   Temp 97.4 °F (36.3 °C) (Temporal)   Resp 18   Ht 5' 5\" (1.651 m)   Wt 135 lb 11.2 oz (61.6 kg)   SpO2 95%   BMI 22.58 kg/m² ,   Intake/Output Summary (Last 24 hours) at 11/02/18 1219  Last data filed at 11/02/18 0451   Gross per 24 hour   Intake             2130 ml   Output              800 ml   Net             1330 ml       GENERAL - well developed and well nourished, is an active participant in this examination  HEENT -  PERRLA, Hearing appears normal, conjunctiva and lids are normal, ears and nose appear normal  NECK - no thyromegaly, no JVD, trachea is in the midline  CARDIOVASCULAR - PMI is in the left mid line clavicular position, Normal S1 and S2 with a grade 1/6 systolic murmur. No S3 or S4    PULMONARY - No respiratory distress. scattered wheezes and rales. Breath sounds in both  lung fields are Decreased  ABDOMEN  - soft, non tender, no rebound, no hepatomegaly or splenomegaly  MUSCULOSKELETAL  - Prone/Supine, digitals and nails are without clubbing or cyanosis  EXTREMITIES - trace edema  NEUROLOGIC - cranial nerves, II-XII, are normal  SKIN - turgor is normal, no rash  PSYCHIATRIC - normal mood and affect, alert and orientated x 3, judgement and insight appear appropriate      ASSESSMENT:    ALL THE CARDIOLOGY PROBLEMS ARE LISTED ABOVE; HOWEVER, THE FOLLOWING SPECIFIC CARDIAC PROBLEMS / CONDITIONS WERE ADDRESSED AND TREATED DURING THE OFFICE VISIT TODAY:                                                                                            MEDICAL DECISION MAKING                   Cardiac Specific Problem / Diagnosis   Discussion and Data Reviewed Diagnostic Procedures Ordered Management Options Selected                 1. Presenting problem / symptom     Chest discomfort  are worsening    lexiscan / cardiolyte, tomorrow  Yes: as per the lexiscan Continue current medications:      Yes:                  2.  CAD Initial presentation during this evaluation    Review and summation of old records:     15 DSE - negative  2011 CATH - LIMA to LAD (patent), SVG to intermediate branch (occluded); SVG to LAD diagonal (ocluded); SVG to RCA (subtotally occluded-stent placement ) Yes: as per the lexiscan Continue current medications:     Yes:                  3. Systemic arterial hypertension Initial presentation during this evaluation The blood pressure for the lastr 36 hours has been:  Systolic (97WCE), GLY:336 , Min:120 , EKJ:630    Diastolic (74DFN), FNY:82, Min:66, Max:74       No Continue current medications:        Yes:          CONSIDERATIONS, THOUGHTS, AND PLANS:    1. Continue present medications except for changes as noted above  2. Continue to monitor rhythm  3. Further orders per clinical course. 4. Post angioplasty orders           Discussed with patient, family and adult child and nursing.     Electronically signed by Kadie Wong MD on 11/2/18        [facility]

## 2018-11-02 NOTE — PROGRESS NOTES
Nephrology ([facility]) Progress Note    Patient:  Sharyl Cowden  YOB: 1940  Date of Service: 11/2/2018  MRN: [id number]   Acct: [de-identified]   Primary Care Physician: Theresa Kennedy MD  Advance Directive: Full Code  Admit Date: 10/29/2018       Hospital Day: 4  Referring Provider: Theresa Kennedy MD    Patient independently seen and examined, Chart, Consults, Notes, Operative notes, Labs, Cardiology, and Radiology studies reviewed as able. Subjective:  Sharyl Cowden is a [de-identified] y.o. female  whom we were consulted for MANOJ. No prior renal issues per pt. No hematuria/dysuria. Recalls no NSAIDs. Admitted for CP with cardiology w/u. Proteinuria noted on prior labs. Today, just back from cath. No c/o per pt. No events per RN. No n/v/d.     Allergies:  Tetanus toxoids    Medicines:  Current Facility-Administered Medications   Medication Dose Route Frequency Provider Last Rate Last Dose    bivalirudin trifluoroacetate (ANGIOMAX) 250 mg in sodium chloride 0.9 % 50 mL infusion  1 mg/kg/hr Intravenous Continuous Seth Zhong MD        metoprolol succinate (TOPROL XL) extended release tablet 25 mg  25 mg Oral Daily Seth Zhong MD        atorvastatin (LIPITOR) tablet 40 mg  40 mg Oral Nightly Seth Zhong MD        enoxaparin (LOVENOX) injection 30 mg  30 mg Subcutaneous Daily Theresa Kennedy MD   30 mg at 11/01/18 0927    0.9 % sodium chloride infusion   Intravenous Continuous Seth Zhong  mL/hr at 11/02/18 3032      sodium chloride flush 0.9 % injection 10 mL  10 mL Intravenous 2 times per day Seth Zhong MD   10 mL at 11/02/18 5481    sodium chloride flush 0.9 % injection 10 mL  10 mL Intravenous PRN Seth Zhong MD        isosorbide mononitrate (IMDUR) extended release tablet 30 mg  30 mg Oral Daily Mariano Albrecht MD   30 mg at 11/02/18 9331    aspirin EC tablet 81 mg  81 mg Oral Daily Tehresa Kennedy MD   81 mg at 11/02/18 4544    clopidogrel (PLAVIX) tablet 75 mg  75 mg Oral Daily Theresa Kennedy MD   75 mg at 11/02/18 4038    doxepin (SINEQUAN) capsule 10 mg  10 mg Oral Nightly Theresa Kennedy MD   10 mg at 11/01/18 1935    escitalopram (LEXAPRO) tablet 20 mg  20 mg Oral Daily Theresa Kennedy MD   20 mg at 11/02/18 9074    insulin glargine (LANTUS) injection vial 25 Units  25 Units Subcutaneous Nightly Theresa Kennedy MD   25 Units at 11/01/18 1937    levETIRAcetam (KEPPRA) tablet 250 mg  250 mg Oral BID Theresa Kennedy MD   250 mg at 11/02/18 1285    nitroGLYCERIN (NITROSTAT) SL tablet 0.4 mg  0.4 mg Sublingual Q5 Min PRN Theresa Kennedy MD        linagliptin (TRADJENTA) tablet 5 mg  5 mg Oral Daily Theresa Kennedy MD   5 mg at 11/02/18 4229    insulin lispro (HUMALOG) injection vial 0-12 Units  0-12 Units Subcutaneous TID WC Theresa Kennedy MD   6 Units at 11/01/18 1242    insulin lispro (HUMALOG) injection vial 0-6 Units  0-6 Units Subcutaneous Nightly Theresa Kennedy MD   3 Units at 10/30/18 2049    glucose (GLUTOSE) 40 % oral gel 15 g  15 g Oral PRN Theresa Kennedy MD        dextrose 50 % solution 12.5 g  12.5 g Intravenous PRN Theresa Kennedy MD        glucagon (rDNA) injection 1 mg  1 mg Intramuscular PRN Theresa Kennedy MD        dextrose 5 % solution  100 mL/hr Intravenous PRN Theresa Kennedy MD        hydrALAZINE (APRESOLINE) injection 10 mg  10 mg Intravenous Q6H PRN Theresa Kennedy MD           Past Medical History:  Past Medical History:   Diagnosis Date    [facility]     CAD (coronary artery disease), native coronary artery     Carotid artery stenosis 1/22/2014    Colon cancer ([facility]     Diabetes mellitus type II     Hyperlipidemia     Cholesterol management per pcp, Sharri Amato M.D.    Hypertension     S/P cardiac cath 07/22/11    Syncope 1/9/14    s/p loop recorder        Past Surgical History:  Past Surgical History:   Procedure Laterality Date    APPENDECTOMY      CATARACT

## 2018-11-02 NOTE — CARE COORDINATION
BIANCA received a voicemail from Maninder Jaimes at [facility]. Maninder Jaimes advised she is unable to offer to Pt. BIANCA contacted Pt's son by phone. SW advised of Mariel Holland being unable to offer. Pt's son would like to look in [facility]. SW read the choice list to Pt's son. Son would like referrals sent to [facility] and [facility]. Pt's son advised he is working to obtain a more permit housing situation. BIANCA faxed referral to [facility] and [facility].      [facility]  P: [phone]  F: [phone]    [facility] (formerly [facility])   B  [phone] F  Electronically signed by Max Lagunas on 11/2/2018 at 3:48 PM

## 2018-11-03 LAB
ANION GAP SERPL CALCULATED.3IONS-SCNC: 12 MMOL/L (ref 7–19)
BUN BLDV-MCNC: 11 MG/DL (ref 8–23)
CALCIUM SERPL-MCNC: 7.7 MG/DL (ref 8.8–10.2)
CHLORIDE BLD-SCNC: 112 MMOL/L (ref 98–111)
CO2: 20 MMOL/L (ref 22–29)
CREAT SERPL-MCNC: 0.9 MG/DL (ref 0.5–0.9)
GFR NON-AFRICAN AMERICAN: >60
GLUCOSE BLD-MCNC: 107 MG/DL (ref 74–109)
GLUCOSE BLD-MCNC: 111 MG/DL (ref 70–99)
GLUCOSE BLD-MCNC: 154 MG/DL (ref 70–99)
GLUCOSE BLD-MCNC: 246 MG/DL (ref 70–99)
GLUCOSE BLD-MCNC: 89 MG/DL (ref 70–99)
PERFORMED ON: ABNORMAL
PERFORMED ON: NORMAL
POTASSIUM SERPL-SCNC: 3.9 MMOL/L (ref 3.5–5)
SODIUM BLD-SCNC: 144 MMOL/L (ref 136–145)

## 2018-11-03 PROCEDURE — 99232 SBSQ HOSP IP/OBS MODERATE 35: CPT | Performed by: INTERNAL MEDICINE

## 2018-11-03 PROCEDURE — 6360000002 HC RX W HCPCS: Performed by: FAMILY MEDICINE

## 2018-11-03 PROCEDURE — G8978 MOBILITY CURRENT STATUS: HCPCS

## 2018-11-03 PROCEDURE — 97116 GAIT TRAINING THERAPY: CPT

## 2018-11-03 PROCEDURE — 82948 REAGENT STRIP/BLOOD GLUCOSE: CPT

## 2018-11-03 PROCEDURE — 2580000003 HC RX 258: Performed by: INTERNAL MEDICINE

## 2018-11-03 PROCEDURE — 97161 PT EVAL LOW COMPLEX 20 MIN: CPT

## 2018-11-03 PROCEDURE — G8979 MOBILITY GOAL STATUS: HCPCS

## 2018-11-03 PROCEDURE — 36415 COLL VENOUS BLD VENIPUNCTURE: CPT

## 2018-11-03 PROCEDURE — 2140000000 HC CCU INTERMEDIATE R&B

## 2018-11-03 PROCEDURE — 93005 ELECTROCARDIOGRAM TRACING: CPT

## 2018-11-03 PROCEDURE — 80048 BASIC METABOLIC PNL TOTAL CA: CPT

## 2018-11-03 PROCEDURE — 6370000000 HC RX 637 (ALT 250 FOR IP): Performed by: EMERGENCY MEDICINE

## 2018-11-03 PROCEDURE — 6370000000 HC RX 637 (ALT 250 FOR IP): Performed by: INTERNAL MEDICINE

## 2018-11-03 PROCEDURE — 6370000000 HC RX 637 (ALT 250 FOR IP): Performed by: FAMILY MEDICINE

## 2018-11-03 RX ORDER — VALSARTAN 40 MG/1
40 TABLET ORAL 2 TIMES DAILY
Status: DISCONTINUED | OUTPATIENT
Start: 2018-11-03 | End: 2018-11-05 | Stop reason: HOSPADM

## 2018-11-03 RX ADMIN — ISOSORBIDE MONONITRATE 30 MG: 30 TABLET, EXTENDED RELEASE ORAL at 08:55

## 2018-11-03 RX ADMIN — INSULIN LISPRO 2 UNITS: 100 INJECTION, SOLUTION INTRAVENOUS; SUBCUTANEOUS at 13:13

## 2018-11-03 RX ADMIN — ATORVASTATIN CALCIUM 40 MG: 40 TABLET, FILM COATED ORAL at 19:50

## 2018-11-03 RX ADMIN — ESCITALOPRAM OXALATE 20 MG: 10 TABLET ORAL at 08:55

## 2018-11-03 RX ADMIN — Medication 10 ML: at 08:55

## 2018-11-03 RX ADMIN — LEVETIRACETAM 250 MG: 250 TABLET, FILM COATED ORAL at 08:55

## 2018-11-03 RX ADMIN — INSULIN GLARGINE 25 UNITS: 100 INJECTION, SOLUTION SUBCUTANEOUS at 19:50

## 2018-11-03 RX ADMIN — VALSARTAN 40 MG: 40 TABLET, FILM COATED ORAL at 19:50

## 2018-11-03 RX ADMIN — LINAGLIPTIN 5 MG: 5 TABLET, FILM COATED ORAL at 08:55

## 2018-11-03 RX ADMIN — METOPROLOL SUCCINATE 25 MG: 25 TABLET, EXTENDED RELEASE ORAL at 08:55

## 2018-11-03 RX ADMIN — Medication 10 ML: at 19:52

## 2018-11-03 RX ADMIN — VALSARTAN 40 MG: 40 TABLET, FILM COATED ORAL at 13:09

## 2018-11-03 RX ADMIN — INSULIN LISPRO 2 UNITS: 100 INJECTION, SOLUTION INTRAVENOUS; SUBCUTANEOUS at 19:52

## 2018-11-03 RX ADMIN — ASPIRIN 81 MG: 81 TABLET, COATED ORAL at 08:55

## 2018-11-03 RX ADMIN — DOXEPIN HYDROCHLORIDE 10 MG: 10 CAPSULE ORAL at 19:53

## 2018-11-03 RX ADMIN — LEVETIRACETAM 250 MG: 250 TABLET, FILM COATED ORAL at 19:50

## 2018-11-03 RX ADMIN — CLOPIDOGREL BISULFATE 75 MG: 75 TABLET ORAL at 08:55

## 2018-11-03 RX ADMIN — ENOXAPARIN SODIUM 40 MG: 40 INJECTION SUBCUTANEOUS at 08:56

## 2018-11-03 ASSESSMENT — PAIN SCALES - GENERAL
PAINLEVEL_OUTOF10: 0
PAINLEVEL_OUTOF10: 0

## 2018-11-03 NOTE — PROGRESS NOTES
Cardiology Daily Note Estefani Rdz MD      Patient:  Reymundo Sue  [id number]    Patient Active Problem List    Diagnosis Date Noted    Angina pectoris ([facility] 10/29/2018     Priority: Low    Spells of decreased attentiveness      Priority: Low    Difficulty with speech      Priority: Low    History of stroke      Priority: Low    History of carotid artery disease      Priority: Low    Near syncope 10/07/2018     Priority: Low    Syncope and collapse 01/25/2018     Priority: Low    Hypoesthesia of skin 01/25/2018     Priority: Low    Diabetes mellitus ([facility]      Priority: Low    Essential hypertension      Priority: Low    Late onset Alzheimer's disease without behavioral disturbance      Priority: Low    Stage 3 chronic kidney disease ([facility]      Priority: Low    TIA (transient ischemic attack) 12/30/2017     Priority: Low    Acute diarrhea 03/28/2016     Priority: Low    Gastroenteritis      Priority: Low    Nausea and vomiting      Priority: Low    Status post placement of implantable loop recorder 10/12/2015     Priority: Low    Carotid artery stenosis 01/22/2014     Priority: Low    Syncope 01/09/2014     Priority: Low    Coronary atherosclerosis of native coronary artery 06/23/2011     Priority: Low    Mixed hyperlipidemia 06/23/2011     Priority: Low    Essential hypertension, benign 06/23/2011     Priority: Low    Coronary atherosclerosis of artery bypass graft 06/23/2011     Priority: Low       Admit Date:  10/29/2018    Admission Problem List: Present on Admission:   Angina pectoris [facility])      Cardiac Specific Data:  Specialty Problems        Cardiology Problems    Coronary atherosclerosis of artery bypass graft        Coronary atherosclerosis of native coronary artery        Essential hypertension, benign        Syncope        Carotid artery stenosis        TIA (transient ischemic attack)        Essential hypertension        Syncope and collapse        Near syncope        Angina device is implanted within the anterior left chest wall. Chronic peribronchial thickening. There is no acute consolidation or convincing edema. There is no pleural effusion or pneumothorax. There is thoracic aortic calcification. There are advanced chronic changes of the left shoulder. 1. COPD with chronic peribronchial thickening. No acute consolidation. 2. Prior mediastinal surgery. Device implanted within the anterior left chest wall. IMPRESSION: 1. No radiographic evidence of acute cardiopulmonary process. . Signed by Dr Tigist Saleem on 10/29/2018 10:40 AM    Ct Head Wo Contrast    Result Date: 10/7/2018  CT HEAD WO CONTRAST 10/7/2018 12:30 PM History: Confusion Comparisons: 12/30/2017 head CT Technique: Radiation dose equals  mGy cm. AUTOMATED EXPOSURE CONTROL dose reduction technique was implemented CT evaluation of the head without intravenous contrast. 5 mm transaxial images were obtained. 2-D sagittal and coronal reconstruction images were generated. Findings: There is central and cortical atrophy. Decreased attenuation in the periventricular white matter observed, mild chronic ischemic changes suspected. There is no intra or extra-axial hemorrhage. There is no mass effect or midline shift. There is no hydrocephalus. Paranasal sinuses imaged in part are clear. No acute osseous abnormalities identified. Impression: 1. No CT evidence of an acute intracranial process. Signed by Dr Jhon Zhong on 10/7/2018 2:01 PM    Us Renal Complete    Result Date: 11/2/2018  EXAMINATION: US RENAL COMPLETE 11/2/2018 9:33 AM HISTORY: Acute renal failure COMPARISON: None FINDINGS: Transabdominal sonographic evaluation of the kidneys and urinary bladder was performed in the transverse and longitudinal projections. The right kidney appears normal in size and echogenicity, measuring 9.5 cm in length. There is no sign of collecting system dilatation or solid renal mass.  The left kidney ----------------------------------------------------------------  Blood Pressure:Right arm 116/61 mmHg. Left arm 111/52 mmHg. Velocities are measured in cm/s ; Diameters are measured in mm Carotid Right Measurements +------------+-------+-------+--------+-------+------------+---------------+ ! Location    ! PSV    ! EDV    ! Angle   ! RI     !%Stenosis   ! Tortuosity     ! +------------+-------+-------+--------+-------+------------+---------------+ ! Prox CCA    !91.5   !11.1   ! 60      !0.88   !            !               ! +------------+-------+-------+--------+-------+------------+---------------+ ! Mid CCA     !75.6   !10.6   ! 60      !0.86   !            !               ! +------------+-------+-------+--------+-------+------------+---------------+ ! Prox ICA    !54.2   !9.43   !60      !0.83   !            !               ! +------------+-------+-------+--------+-------+------------+---------------+ ! Mid ICA     !60.8   !17     !54      !0.72   !            !               ! +------------+-------+-------+--------+-------+------------+---------------+ ! Dist ICA    !83.8   !25.8   !60      !0.69   !            !               ! +------------+-------+-------+--------+-------+------------+---------------+ ! Prox ECA    !166    !       !61      !       !            !               ! +------------+-------+-------+--------+-------+------------+---------------+ ! Vertebral   !70.3   !15.7   !60      !0.78   !            !               ! +------------+-------+-------+--------+-------+------------+---------------+   - There is antegrade vertebral flow noted on the right side. - Additional Measurements:ICAPSV/CCAPSV 0.92. ICAEDV/CCAEDV 2.32. Carotid Left Measurements +------------+-------+-------+--------+-------+------------+---------------+ ! Location    ! PSV    ! EDV    ! Angle   ! RI     !%Stenosis   ! Tortuosity     ! +------------+-------+-------+--------+-------+------------+---------------+ ! Prox CCA    !98.2   !14.9   ! 61 cardiac catheterization Signed by Dr María Chapa on 10/31/2018 1:50 PM        Assessment:  1. Complaints of chest pressure on admission  2. Echocardiogram 10/30/18 normal left ventricular function and estimated ejection fraction 55-60% and no wall motion abnormalities  3. Lexiscan stress test and 30/1/18 evidence of anterolateral scar plus ischemia estimated ejection fraction 64% and 15% ischemia burden  4. Status post catheterization this admission with stent placement circumflex bare metal stent  5. Hyperlipidemia  6. Hypertension  7. Carotid artery stenosis  8. History of loop recorder  9. Diabetes  10. History of Alzheimer's  11. Chronic stage III kidney disease  12. Anemia hemoglobin 11.6    Plan:  1.  Continue current treatment cardiac status appears stable    Nia Johnson MD 11/3/2018 10:35 AM

## 2018-11-03 NOTE — PROGRESS NOTES
palpitations  Gastrointestinal ROS: No abdominal pain or melena  Genito-Urinary ROS: No dysuria or hematuria  Musculoskeletal ROS: No joint pain or swelling         Objective:  Blood pressure (!) 147/65, pulse 66, temperature 98.4 °F (36.9 °C), temperature source Temporal, resp. rate 16, height 5' 5\" (1.651 m), weight 139 lb (63 kg), SpO2 96 %. Intake/Output Summary (Last 24 hours) at 11/03/18 1040  Last data filed at 11/03/18 0852   Gross per 24 hour   Intake              690 ml   Output             1600 ml   Net             -910 ml     General: awake/alert   Chest:  clear to auscultation bilaterally without respiratory distress  CVS: regular rate and rhythm  Abdominal: soft, nontender, normal bowel sounds  Extremities: no cyanosis or edema  Skin: warm and dry without rash    Labs:  BMP:   Recent Labs      11/01/18 0221 11/02/18 0157 11/03/18   0125   NA  138  142  144   K  4.5  4.5  3.9   CL  101  107  112*   CO2  23  24  20*   PHOS   --   3.5   --    BUN  29*  22  11   CREATININE  1.9*  1.3*  0.9   CALCIUM  8.9  8.5*  7.7*     CBC:   Recent Labs      11/01/18 0221   WBC  6.1   HGB  11.6*   HCT  34.7*   MCV  87.4   PLT  170     LIVER PROFILE: No results for input(s): AST, ALT, LIPASE, BILIDIR, BILITOT, ALKPHOS in the last 72 hours. Invalid input(s): AMYLASE,  ALB  PT/INR: No results for input(s): PROTIME, INR in the last 72 hours. APTT: No results for input(s): APTT in the last 72 hours. BNP:  No results for input(s): BNP in the last 72 hours. Ionized Calcium:No results for input(s): IONCA in the last 72 hours. Magnesium:  Recent Labs      11/02/18 0157   MG  2.1     Phosphorus:  Recent Labs      11/02/18 0157   PHOS  3.5     HgbA1C: No results for input(s): LABA1C in the last 72 hours. Hepatic: No results for input(s): ALKPHOS, ALT, AST, PROT, BILITOT, BILIDIR, LABALBU in the last 72 hours. Lactic Acid: No results for input(s): LACTA in the last 72 hours.   Troponin: No results for ! +------------+-------+-------+--------+-------+------------+---------------+ ! Vertebral   !70.3   !15.7   !60      !0.78   !            !               ! +------------+-------+-------+--------+-------+------------+---------------+   - There is antegrade vertebral flow noted on the right side. - Additional Measurements:ICAPSV/CCAPSV 0.92. ICAEDV/CCAEDV 2.32. Carotid Left Measurements +------------+-------+-------+--------+-------+------------+---------------+ ! Location    ! PSV    ! EDV    ! Angle   ! RI     !%Stenosis   ! Tortuosity     ! +------------+-------+-------+--------+-------+------------+---------------+ ! Prox CCA    !98.2   !14.9   !60      !0.85   !            !               ! +------------+-------+-------+--------+-------+------------+---------------+ ! Mid CCA     !98.5   !14.1   ! 60      !0.86   !            !               ! +------------+-------+-------+--------+-------+------------+---------------+ ! Prox ICA    !78.6   !17.6   ! 60      !0.78   !            !               ! +------------+-------+-------+--------+-------+------------+---------------+ ! Mid ICA     !114    !34     !60      !0.7    ! !               ! +------------+-------+-------+--------+-------+------------+---------------+ ! Dist ICA    ! 155    !37.3   ! 60      !0.76   !            !               ! +------------+-------+-------+--------+-------+------------+---------------+ ! Prox ECA    !928    !       !61      !       !            !               ! +------------+-------+-------+--------+-------+------------+---------------+ ! Vertebral   !63.8   !17.7   ! 60      !0.72   !            !               ! +------------+-------+-------+--------+-------+------------+---------------+   - There is antegrade vertebral flow noted on the left side. - Additional Measurements:ICAPSV/CCAPSV 1.58. ICAEDV/CCAEDV 2.5.     Mri Brain Wo Contrast    Result Date: 10/8/2018  EXAMINATION:  MRI BRAIN WO CONTRAST  10/8/2018 8:59 AM HISTORY:

## 2018-11-04 LAB
ANION GAP SERPL CALCULATED.3IONS-SCNC: 12 MMOL/L (ref 7–19)
BUN BLDV-MCNC: 10 MG/DL (ref 8–23)
CALCIUM SERPL-MCNC: 8.6 MG/DL (ref 8.8–10.2)
CHLORIDE BLD-SCNC: 108 MMOL/L (ref 98–111)
CO2: 20 MMOL/L (ref 22–29)
CREAT SERPL-MCNC: 1 MG/DL (ref 0.5–0.9)
GFR NON-AFRICAN AMERICAN: 53
GLUCOSE BLD-MCNC: 108 MG/DL (ref 74–109)
GLUCOSE BLD-MCNC: 146 MG/DL (ref 70–99)
GLUCOSE BLD-MCNC: 158 MG/DL (ref 70–99)
GLUCOSE BLD-MCNC: 192 MG/DL (ref 70–99)
GLUCOSE BLD-MCNC: 75 MG/DL (ref 70–99)
PERFORMED ON: ABNORMAL
PERFORMED ON: NORMAL
POTASSIUM SERPL-SCNC: 4 MMOL/L (ref 3.5–5)
SODIUM BLD-SCNC: 140 MMOL/L (ref 136–145)

## 2018-11-04 PROCEDURE — 2140000000 HC CCU INTERMEDIATE R&B

## 2018-11-04 PROCEDURE — 6360000002 HC RX W HCPCS: Performed by: FAMILY MEDICINE

## 2018-11-04 PROCEDURE — 80048 BASIC METABOLIC PNL TOTAL CA: CPT

## 2018-11-04 PROCEDURE — 97110 THERAPEUTIC EXERCISES: CPT

## 2018-11-04 PROCEDURE — 6370000000 HC RX 637 (ALT 250 FOR IP): Performed by: INTERNAL MEDICINE

## 2018-11-04 PROCEDURE — 82948 REAGENT STRIP/BLOOD GLUCOSE: CPT

## 2018-11-04 PROCEDURE — 36415 COLL VENOUS BLD VENIPUNCTURE: CPT

## 2018-11-04 PROCEDURE — 99231 SBSQ HOSP IP/OBS SF/LOW 25: CPT | Performed by: INTERNAL MEDICINE

## 2018-11-04 PROCEDURE — 6370000000 HC RX 637 (ALT 250 FOR IP): Performed by: EMERGENCY MEDICINE

## 2018-11-04 PROCEDURE — 97116 GAIT TRAINING THERAPY: CPT

## 2018-11-04 PROCEDURE — 6370000000 HC RX 637 (ALT 250 FOR IP): Performed by: FAMILY MEDICINE

## 2018-11-04 PROCEDURE — 2580000003 HC RX 258: Performed by: INTERNAL MEDICINE

## 2018-11-04 RX ORDER — SODIUM BICARBONATE 325 MG/1
325 TABLET ORAL 2 TIMES DAILY
Status: DISCONTINUED | OUTPATIENT
Start: 2018-11-04 | End: 2018-11-05

## 2018-11-04 RX ADMIN — VALSARTAN 40 MG: 40 TABLET, FILM COATED ORAL at 22:18

## 2018-11-04 RX ADMIN — CLOPIDOGREL BISULFATE 75 MG: 75 TABLET ORAL at 08:55

## 2018-11-04 RX ADMIN — INSULIN LISPRO 2 UNITS: 100 INJECTION, SOLUTION INTRAVENOUS; SUBCUTANEOUS at 11:32

## 2018-11-04 RX ADMIN — INSULIN GLARGINE 25 UNITS: 100 INJECTION, SOLUTION SUBCUTANEOUS at 22:19

## 2018-11-04 RX ADMIN — ISOSORBIDE MONONITRATE 30 MG: 30 TABLET, EXTENDED RELEASE ORAL at 08:55

## 2018-11-04 RX ADMIN — ASPIRIN 81 MG: 81 TABLET, COATED ORAL at 08:55

## 2018-11-04 RX ADMIN — DOXEPIN HYDROCHLORIDE 10 MG: 10 CAPSULE ORAL at 22:18

## 2018-11-04 RX ADMIN — SODIUM BICARBONATE 325 MG: 325 TABLET ORAL at 15:14

## 2018-11-04 RX ADMIN — Medication 10 ML: at 08:55

## 2018-11-04 RX ADMIN — ESCITALOPRAM OXALATE 20 MG: 10 TABLET ORAL at 08:55

## 2018-11-04 RX ADMIN — LEVETIRACETAM 250 MG: 250 TABLET, FILM COATED ORAL at 22:18

## 2018-11-04 RX ADMIN — LEVETIRACETAM 250 MG: 250 TABLET, FILM COATED ORAL at 08:55

## 2018-11-04 RX ADMIN — ATORVASTATIN CALCIUM 40 MG: 40 TABLET, FILM COATED ORAL at 22:18

## 2018-11-04 RX ADMIN — SODIUM BICARBONATE 325 MG: 325 TABLET ORAL at 22:18

## 2018-11-04 RX ADMIN — VALSARTAN 40 MG: 40 TABLET, FILM COATED ORAL at 08:55

## 2018-11-04 RX ADMIN — INSULIN LISPRO 2 UNITS: 100 INJECTION, SOLUTION INTRAVENOUS; SUBCUTANEOUS at 17:09

## 2018-11-04 RX ADMIN — LINAGLIPTIN 5 MG: 5 TABLET, FILM COATED ORAL at 08:55

## 2018-11-04 RX ADMIN — METOPROLOL SUCCINATE 25 MG: 25 TABLET, EXTENDED RELEASE ORAL at 08:55

## 2018-11-04 RX ADMIN — ENOXAPARIN SODIUM 40 MG: 40 INJECTION SUBCUTANEOUS at 08:55

## 2018-11-04 RX ADMIN — Medication 10 ML: at 22:18

## 2018-11-04 RX ADMIN — INSULIN LISPRO 1 UNITS: 100 INJECTION, SOLUTION INTRAVENOUS; SUBCUTANEOUS at 22:19

## 2018-11-04 ASSESSMENT — PAIN SCALES - GENERAL
PAINLEVEL_OUTOF10: 0

## 2018-11-04 NOTE — PROGRESS NOTES
Physical Therapy  Name: Gris Leal  MRN:  [id number]  Date of service:  11/4/2018 11/04/18 0854   General   Family / Caregiver Present No   Subjective   Subjective Pt willing to walk; would like to go to BR to start   General Comment   Comments in bed; wishes to return there despite suggestion that she sit up in recliner   Pain Screening   Patient Currently in Pain No   Bed Mobility   Supine to Sit Modified independent   Sit to Supine Modified independent   Comment use of rails and extra time to perform bed mob   Transfers   Sit to Stand Contact guard assistance;Stand by assistance   Stand to sit Contact guard assistance;Stand by assistance   Comment sit to stand from toilet and bed   Ambulation   Ambulation? Yes   Ambulation 1   Surface level tile   Device Meyer rail   Assistance Contact guard assistance   Quality of Gait Slow pace and small steps   Distance 100'   Exercises   Hip Flexion 15x   Hip Abduction ADD sets 15x   Knee Long Arc Quad 15x   Ankle Pumps 15x   Comments ex performed sitting eob for B LE   Other Activities   Comment pt in/out of BR with cg-stby@ and managed toileting hygiene indep   Short term goals   Time Frame for Short term goals 2 weeks   Short term goal 1 Independent with all bed mobility and transfers   Short term goal 2 Ambulate 400 feet independently   Conditions Requiring Skilled Therapeutic Intervention   Assessment inc dist some but cont with slow pace and very small steps; pt weak and deconditioned   Safety Devices   Type of devices Call light within reach; Left in bed  (nsg present to administer meds so left eob)       Electronically signed by Nohemy Mcghee PTA on 11/4/2018 at 9:48 AM

## 2018-11-04 NOTE — PROGRESS NOTES
were 77 and  83 respectively. Lexiscan/Cardiolyte Nuclear Medicine Report Date of Procedure: 10/31/2018 The patient was injected with 9.8 millicuries (mCi) of Technetium (Tc99m). After an appropriate level of stress the patient was re-injected with 28 millicuries (mCi) of Technetium (Tc99m). Repeat gated images were then performed per standard protocol. Findings: 1. Analysis of the the stress and rest images reveals anterior lateral MI + ischemia. 2.  Analysis of the gated images reveals grossly normal left ventricular function with a calculated ejection fraction of 64 %. 3.  There is 43 % ischemic myocardium at risk on stress and 28 % ischemic myocardium at risk on rest.      Impression: There is anterior lateral MI + ischemia, with a calculated ejection fraction of 64 % with a 15 % of ischemic burden.  Suggest: cardiac catheterization Signed by Dr Gerry Gutierrez on 10/31/2018 1:50 PM       Assessment   MANOJ  CKD 3  HTN  DM2  Persistent proteinuria  Acidosis    Plan:  Likely underlying DM2 nephropathy, uncontrolled, on insulin  US ok  Monitor labs  D/w pt/family  bicarb    Saji Velásquez MD  11/04/18  11:55 AM

## 2018-11-04 NOTE — PROGRESS NOTES
cardiac catheterization Signed by Dr Adrian Bermudez on 10/31/2018 1:50 PM        Assessment:  1. Complaints of chest pressure on admission  2. Echocardiogram 10/30/18 normal left ventricular function and estimated ejection fraction 55-60% and no wall motion abnormalities  3. Lexiscan stress test and 30/1/18 evidence of anterolateral scar plus ischemia estimated ejection fraction 64% and 15% ischemia burden  4. Status post catheterization this admission with stent placement circumflex bare metal stent  5. Hyperlipidemia  6. Hypertension  7. Carotid artery stenosis  8. History of loop recorder  9. Diabetes  10. History of Alzheimer's  11. Chronic stage III kidney disease  12. Anemia hemoglobin 11.6    Plan:  1.  Continue current treatment stable from a cardiac standpoint at this time    José Miguel Montero MD 11/4/2018 10:47 AM

## 2018-11-04 NOTE — PLAN OF CARE
Problem: Risk for Impaired Skin Integrity  Goal: Tissue integrity - skin and mucous membranes  Structural intactness and normal physiological function of skin and  mucous membranes.    Outcome: Met This Shift      Problem: Cardiac Output - Decreased:  Goal: Hemodynamic stability will improve  Hemodynamic stability will improve   Outcome: Met This Shift

## 2018-11-05 VITALS
SYSTOLIC BLOOD PRESSURE: 128 MMHG | BODY MASS INDEX: 23.13 KG/M2 | HEART RATE: 59 BPM | DIASTOLIC BLOOD PRESSURE: 65 MMHG | TEMPERATURE: 97.4 F | OXYGEN SATURATION: 96 % | RESPIRATION RATE: 16 BRPM | WEIGHT: 138.8 LBS | HEIGHT: 65 IN

## 2018-11-05 LAB
ANION GAP SERPL CALCULATED.3IONS-SCNC: 11 MMOL/L (ref 7–19)
BASOPHILS ABSOLUTE: 0.1 K/UL (ref 0–0.2)
BASOPHILS RELATIVE PERCENT: 0.9 % (ref 0–1)
BUN BLDV-MCNC: 10 MG/DL (ref 8–23)
CALCIUM SERPL-MCNC: 8.7 MG/DL (ref 8.8–10.2)
CHLORIDE BLD-SCNC: 107 MMOL/L (ref 98–111)
CO2: 24 MMOL/L (ref 22–29)
CREAT SERPL-MCNC: 1 MG/DL (ref 0.5–0.9)
EKG P AXIS: 74 DEGREES
EKG P-R INTERVAL: 182 MS
EKG Q-T INTERVAL: 426 MS
EKG QRS DURATION: 94 MS
EKG QTC CALCULATION (BAZETT): 434 MS
EKG T AXIS: 139 DEGREES
EOSINOPHILS ABSOLUTE: 0.3 K/UL (ref 0–0.6)
EOSINOPHILS RELATIVE PERCENT: 3.6 % (ref 0–5)
GFR NON-AFRICAN AMERICAN: 53
GLUCOSE BLD-MCNC: 114 MG/DL (ref 74–109)
GLUCOSE BLD-MCNC: 156 MG/DL (ref 70–99)
GLUCOSE BLD-MCNC: 186 MG/DL (ref 70–99)
GLUCOSE BLD-MCNC: 99 MG/DL (ref 70–99)
HCT VFR BLD CALC: 32 % (ref 37–47)
HEMOGLOBIN: 10.7 G/DL (ref 12–16)
LYMPHOCYTES ABSOLUTE: 1.7 K/UL (ref 1.1–4.5)
LYMPHOCYTES RELATIVE PERCENT: 24 % (ref 20–40)
MCH RBC QN AUTO: 29.2 PG (ref 27–31)
MCHC RBC AUTO-ENTMCNC: 33.4 G/DL (ref 33–37)
MCV RBC AUTO: 87.2 FL (ref 81–99)
MONOCYTES ABSOLUTE: 0.7 K/UL (ref 0–0.9)
MONOCYTES RELATIVE PERCENT: 10.5 % (ref 0–10)
NEUTROPHILS ABSOLUTE: 4.2 K/UL (ref 1.5–7.5)
NEUTROPHILS RELATIVE PERCENT: 60.9 % (ref 50–65)
PDW BLD-RTO: 12.4 % (ref 11.5–14.5)
PERFORMED ON: ABNORMAL
PERFORMED ON: ABNORMAL
PERFORMED ON: NORMAL
PLATELET # BLD: 175 K/UL (ref 130–400)
PMV BLD AUTO: 10.2 FL (ref 9.4–12.3)
POTASSIUM SERPL-SCNC: 3.9 MMOL/L (ref 3.5–5)
RBC # BLD: 3.67 M/UL (ref 4.2–5.4)
SODIUM BLD-SCNC: 142 MMOL/L (ref 136–145)
WBC # BLD: 6.9 K/UL (ref 4.8–10.8)

## 2018-11-05 PROCEDURE — 97110 THERAPEUTIC EXERCISES: CPT

## 2018-11-05 PROCEDURE — 82948 REAGENT STRIP/BLOOD GLUCOSE: CPT

## 2018-11-05 PROCEDURE — 97116 GAIT TRAINING THERAPY: CPT

## 2018-11-05 PROCEDURE — 36415 COLL VENOUS BLD VENIPUNCTURE: CPT

## 2018-11-05 PROCEDURE — 6370000000 HC RX 637 (ALT 250 FOR IP): Performed by: INTERNAL MEDICINE

## 2018-11-05 PROCEDURE — 6370000000 HC RX 637 (ALT 250 FOR IP): Performed by: EMERGENCY MEDICINE

## 2018-11-05 PROCEDURE — 85025 COMPLETE CBC W/AUTO DIFF WBC: CPT

## 2018-11-05 PROCEDURE — 2580000003 HC RX 258: Performed by: INTERNAL MEDICINE

## 2018-11-05 PROCEDURE — 80048 BASIC METABOLIC PNL TOTAL CA: CPT

## 2018-11-05 PROCEDURE — 6370000000 HC RX 637 (ALT 250 FOR IP): Performed by: FAMILY MEDICINE

## 2018-11-05 PROCEDURE — 6360000002 HC RX W HCPCS: Performed by: FAMILY MEDICINE

## 2018-11-05 RX ADMIN — ASPIRIN 81 MG: 81 TABLET, COATED ORAL at 09:07

## 2018-11-05 RX ADMIN — CLOPIDOGREL BISULFATE 75 MG: 75 TABLET ORAL at 09:08

## 2018-11-05 RX ADMIN — VALSARTAN 40 MG: 40 TABLET, FILM COATED ORAL at 09:08

## 2018-11-05 RX ADMIN — ISOSORBIDE MONONITRATE 30 MG: 30 TABLET, EXTENDED RELEASE ORAL at 09:08

## 2018-11-05 RX ADMIN — ENOXAPARIN SODIUM 40 MG: 40 INJECTION SUBCUTANEOUS at 09:07

## 2018-11-05 RX ADMIN — INSULIN LISPRO 2 UNITS: 100 INJECTION, SOLUTION INTRAVENOUS; SUBCUTANEOUS at 12:31

## 2018-11-05 RX ADMIN — Medication 10 ML: at 09:07

## 2018-11-05 RX ADMIN — SODIUM BICARBONATE 325 MG: 325 TABLET ORAL at 09:07

## 2018-11-05 RX ADMIN — LINAGLIPTIN 5 MG: 5 TABLET, FILM COATED ORAL at 09:07

## 2018-11-05 RX ADMIN — METOPROLOL SUCCINATE 25 MG: 25 TABLET, EXTENDED RELEASE ORAL at 09:08

## 2018-11-05 RX ADMIN — ESCITALOPRAM OXALATE 20 MG: 10 TABLET ORAL at 09:07

## 2018-11-05 RX ADMIN — LEVETIRACETAM 250 MG: 250 TABLET, FILM COATED ORAL at 09:08

## 2018-11-05 ASSESSMENT — PAIN SCALES - GENERAL: PAINLEVEL_OUTOF10: 0

## 2018-11-05 NOTE — PROGRESS NOTES
BILITOT, BILIDIR, LABALBU in the last 72 hours. Lactic Acid: No results for input(s): LACTA in the last 72 hours. Troponin: No results for input(s): CKTOTAL, CKMB, TROPONINT in the last 72 hours. ABGs: No results found for: PHART, PO2ART, KKC9MYH  CRP:  No results for input(s): CRP in the last 72 hours. Sed Rate:  No results for input(s): SEDRATE in the last 72 hours. Culture Results:   Blood Culture Recent: No results for input(s): BC in the last 720 hours. Urine Culture Recent : No results for input(s): LABURIN in the last 720 hours. Radiology reports as per the Radiologist  Radiology: Xr Chest Standard (2 Vw)    Result Date: 10/29/2018  HISTORY: Shortness breath CXR: 2 views of the chest are obtained. COMPARISON: 10/7/2018. FINDINGS: Lungs are hyperinflated. There is evidence prior mediastinal surgery. A device is implanted within the anterior left chest wall. Chronic peribronchial thickening. There is no acute consolidation or convincing edema. There is no pleural effusion or pneumothorax. There is thoracic aortic calcification. There are advanced chronic changes of the left shoulder. 1. COPD with chronic peribronchial thickening. No acute consolidation. 2. Prior mediastinal surgery. Device implanted within the anterior left chest wall. IMPRESSION: 1. No radiographic evidence of acute cardiopulmonary process. . Signed by Dr Sofy Lorenzo on 10/29/2018 10:40 AM    Ct Head Wo Contrast    Result Date: 10/7/2018  CT HEAD WO CONTRAST 10/7/2018 12:30 PM History: Confusion Comparisons: 12/30/2017 head CT Technique: Radiation dose equals  mGy cm. AUTOMATED EXPOSURE CONTROL dose reduction technique was implemented CT evaluation of the head without intravenous contrast. 5 mm transaxial images were obtained. 2-D sagittal and coronal reconstruction images were generated. Findings: There is central and cortical atrophy.  Decreased attenuation in the periventricular white matter observed, mild chronic Physician Primo Costa MD   Date of Birth    1940    Referring Physician    Roshan Culp   Accession Number [id number]     [id number]            YGFJHH, RVT [place]  Procedure Type of Study:   Cerebral:Carotid, VL CAROTID BILATERAL. Indications for Study: Altered mental status, Slurred Speech and Syncope. Impression   There is smooth plaque visualized in the left internal carotid  artery(ies). There is 50% - 69% stenosis of the left internal carotid artery. Well healed right carotid endarterectomy site with no recurrent stenosis  or residual plaque. There is normal antegrade flow in the bilateral vertebral artery(ies). Signature   ----------------------------------------------------------------  Electronically signed by Primo Costa MD(Interpreting  physician) on 10/08/2018 12:34 PM  ----------------------------------------------------------------  Blood Pressure:Right arm 116/61 mmHg. Left arm 111/52 mmHg. Velocities are measured in cm/s ; Diameters are measured in mm Carotid Right Measurements +------------+-------+-------+--------+-------+------------+---------------+ ! Location    ! PSV    ! EDV    ! Angle   ! RI     !%Stenosis   ! Tortuosity     ! +------------+-------+-------+--------+-------+------------+---------------+ ! Prox CCA    !91.5   !11.1   ! 60      !0.88   !            !               ! +------------+-------+-------+--------+-------+------------+---------------+ ! Mid CCA     !75.6   !10.6   ! 60      !0.86   !            !               ! +------------+-------+-------+--------+-------+------------+---------------+ ! Prox ICA    !54.2   !9.43   !60      !0.83   !            !               ! +------------+-------+-------+--------+-------+------------+---------------+ ! Mid ICA     !60.8   !17     !54      !0.72   !            !               ! +------------+-------+-------+--------+-------+------------+---------------+ ! Dist ICA    !83.8   !25.8   !60      !0.69   !            !               !

## 2018-11-05 NOTE — PROGRESS NOTES
11/05/18 1017   General   Chart Reviewed Yes   Family / Caregiver Present No   Subjective   Subjective Patient in bed agrees to walk   Pain Screening   Patient Currently in Pain No   Vital Signs   Level of Consciousness 0   Oxygen Therapy   O2 Device None (Room air)   Orientation   Overall Orientation Status WFL   Bed Mobility   Supine to Sit Modified independent   Sit to Supine Stand by assistance   Transfers   Sit to Stand Minimal Assistance   Stand to sit Contact guard assistance   Ambulation   Ambulation? Yes   Ambulation 1   Surface level tile   Device Hand-Held Assist   Assistance Contact guard assistance   Quality of Gait Slow pace and small steps   Distance 150'   Comments One minor LOB   Balance   Posture Good   Sitting - Static Good   Sitting - Dynamic Good   Standing - Static Good;-   Standing - Dynamic Good;-   Exercises   Heelslides 10   Hip Flexion 10   Hip Abduction 10   Knee Long Arc Quad 10   Knee Active Range of Motion yes   Ankle Pumps 10   Comments Patient returned to supine post gait   Short term goals   Time Frame for Short term goals 2 weeks   Short term goal 1 Independent with all bed mobility and transfers   Short term goal 2 Ambulate 400 feet independently   Conditions Requiring Skilled Therapeutic Intervention   Body structures, Functions, Activity limitations Decreased functional mobility    Activity Tolerance   Activity Tolerance Patient Tolerated treatment well   Safety Devices   Type of devices Left in bed;Call light within reach; Bed alarm in place   Physical Therapy    Electronically signed by Xu Jimenez PTA on 11/5/2018 at 10:22 AM

## 2018-11-05 NOTE — PROGRESS NOTES
Per Dr Stevan Quinn progress note 11/4/18, stable from cardiac standpoint.   Electronically signed by Ahmet Stout RN on 11/5/2018 at 10:51 AM

## 2018-11-05 NOTE — DISCHARGE SUMMARY
[facility] [employer] [facility] [address], [address] [facility]                               DISCHARGE SUMMARY    PATIENT NAME: Tahir Pete                      :        1940  MED REC NO:   [id number]                              ROOM:       [facility]  ACCOUNT NO:   [de-identified]                           ADMIT DATE: 10/29/2018  PROVIDER:     Scott Denis MD                   [facility] DATE:    DIAGNOSES:  1. Ischemic heart disease status post angioplasty and bare-metal stent  placement. 2.  Acute kidney injury on chronic kidney disease. 3.  Chronic kidney disease, stage III. 4.  Hypertension. 5.  Diabetes. 6.  Persistent proteinuria. 7.  Acidosis, resolved. 8.  Mild Alzheimer dementia. HOSPITAL COURSE:  The patient was seen by Renal and Cardiology. She had  stent placed by Dr. Toan Oro. She has been stable. He recommended 1  month's worth of Plavix along with the aspirin and to continue aspirin  after the month was up, as it was a bare-metal stent. She has improved  and she is without any major issues at this point. Her hospital stay  shows that she has improved. Her vitals have been stable and she is  felt ready for discharge to nursing home. During this admission, she  had ultrasound of the kidneys, which showed a normal renal ultrasound. She had a perfusion SPECT scan, which showed ischemia. She had a heart  cath by Dr. Toan Oro with angioplasty and stent with good results. She  had a chest x-ray, which was negative for any acute finding. Her meds  on discharge will be,  1. Tylenol 650 every 4 hours as needed. 2.  Aspirin 81 mg daily. 3.  Imdur 30 mg daily. 4.  Nitroglycerin 0.4 mg sublingual as needed. 5.  Keppra 250 twice daily. 6.  Doxepin 10 mg at bedtime. 7.  Lexapro 20 mg daily. 8.  Januvia 50 mg daily, actually she takes half of 50 mg tablet daily. 9.  Simvastatin 40 mg daily, actually she is taking 80 mg daily of  simvastatin. 10.   She

## 2018-11-05 NOTE — PROGRESS NOTES
Faxed dc summary and AVS to [facility]. Received confirmation.   Electronically signed by Marco Moyer RN on 11/5/2018 at 3:35 PM

## 2018-11-05 NOTE — PROGRESS NOTES
Report called to Lauro Garsia at [facility].    Electronically signed by Inés Cuellar RN on 11/5/2018 at 3:43 PM

## 2018-11-05 NOTE — PROGRESS NOTES
Progress Note  11/5/2018 6:52 AM  Subjective:   Admit Date: 10/29/2018  PCP: Torie Ledezma MD    Interval History: No complaints this am.    DIET CARDIAC; Low Sodium (2 GM)    Intake/Output Summary (Last 24 hours) at 11/05/18 0652  Last data filed at 11/05/18 0434   Gross per 24 hour   Intake              980 ml   Output             1400 ml   Net             -420 ml     Medications:      dextrose        sodium bicarbonate  325 mg Oral BID    enoxaparin  40 mg Subcutaneous Daily    valsartan  40 mg Oral BID    sodium chloride flush  10 mL Intravenous 2 times per day    metoprolol succinate  25 mg Oral Daily    atorvastatin  40 mg Oral Nightly    isosorbide mononitrate  30 mg Oral Daily    aspirin EC  81 mg Oral Daily    clopidogrel  75 mg Oral Daily    doxepin  10 mg Oral Nightly    escitalopram  20 mg Oral Daily    insulin glargine  25 Units Subcutaneous Nightly    levETIRAcetam  250 mg Oral BID    linagliptin  5 mg Oral Daily    insulin lispro  0-12 Units Subcutaneous TID WC    insulin lispro  0-6 Units Subcutaneous Nightly     Recent Labs      11/05/18   0114   WBC  6.9   HGB  10.7*   PLT  175     Recent Labs      11/03/18   0125  11/04/18   0205  11/05/18   0114   NA  144  140  142   K  3.9  4.0  3.9   CL  112*  108  107   CO2  20*  20*  24   BUN  11  10  10   CREATININE  0.9  1.0*  1.0*   GLUCOSE  107  108  114*     No results for input(s): AST, ALT, ALB, BILITOT, ALKPHOS in the last 72 hours.     Objective:   Vitals: /61   Pulse 65   Temp 97.9 °F (36.6 °C) (Temporal)   Resp 16   Ht 5' 5\" (1.651 m)   Wt 138 lb 12.8 oz (63 kg)   SpO2 96%   BMI 23.10 kg/m²   General appearance: alert and cooperative with exam  Lungs: clear to auscultation bilaterally  Heart: regular rate and rhythm, S1, S2 normal, no murmur, click, rub or gallop  Abdomen: soft, non-tender; bowel sounds normal; no masses,  no organomegaly  Extremities: extremities normal, atraumatic, no cyanosis or edema  Neurologic:

## 2018-11-09 ENCOUNTER — NURSE TRIAGE (OUTPATIENT)
Dept: CALL CENTER | Facility: HOSPITAL | Age: 78
End: 2018-11-09

## 2018-11-09 NOTE — TELEPHONE ENCOUNTER
"Asked caller to contact office during normal operating hours and he refused stating it was their mistake and she will not be there as she is in rehab. Thank you. Told him I will fax note to office. Please cancel 8am appt for today.    Reason for Disposition  • Caller requesting an appointment, triage offered and declined    Additional Information  • Negative: Lab calling with strep throat test results and triager can call in prescription  • Negative: Lab calling with urinalysis test results and triager can call in prescription  • Negative: Medication questions  • Negative: ED call to PCP  • Negative: Physician call to PCP  • Negative: Call about patient who is currently hospitalized  • Negative: Lab or radiology calling with CRITICAL test results  • Negative: [1] Prescription not at pharmacy AND [2] was prescribed today by PCP  • Negative: [1] Follow-up call from patient regarding patient's clinical status AND [2] information urgent  • Negative: [1] Caller requests to speak ONLY to PCP AND [2] URGENT question  • Negative: [1] Caller requests to speak to PCP now AND [2] won't tell us reason for call  (Exception: if 10 pm to 6 am, caller must first discuss reason for the call)  • Negative: Notification of hospital admission  • Negative: Notification of death  • Negative: Caller requesting lab results  • Negative: Lab or radiology calling with test results  • Negative: [1] Follow-up call from patient regarding patient's clinical status AND [2] information NON-URGENT  • Negative: [1] Caller requests to speak ONLY to PCP AND [2] NON-URGENT question    Answer Assessment - Initial Assessment Questions  1. REASON FOR CALL or QUESTION: \"What is your reason for calling today?\" or \"How can I best  help you?\" or \"What question do you have that I can help answer?\"      Cancel appt today 8am, appt was made by office, not patient according to caller.  2. CALLER: Document the source of call. (e.g., laboratory, patient).      Nura" [place]    Protocols used: PCP CALL - NO TRIAGE-ADULT-AH

## 2018-12-06 LAB — HBA1C MFR BLD: 9.7 % (ref 4–6)

## 2018-12-19 ENCOUNTER — OFFICE VISIT (OUTPATIENT)
Dept: CARDIOLOGY | Age: 78
End: 2018-12-19
Payer: MEDICARE

## 2018-12-19 VITALS
BODY MASS INDEX: 22.82 KG/M2 | WEIGHT: 137 LBS | HEART RATE: 68 BPM | DIASTOLIC BLOOD PRESSURE: 82 MMHG | HEIGHT: 65 IN | SYSTOLIC BLOOD PRESSURE: 138 MMHG

## 2018-12-19 DIAGNOSIS — E78.2 MIXED HYPERLIPIDEMIA: ICD-10-CM

## 2018-12-19 DIAGNOSIS — R55 SYNCOPE AND COLLAPSE: ICD-10-CM

## 2018-12-19 DIAGNOSIS — I25.10 ATHEROSCLEROSIS OF NATIVE CORONARY ARTERY OF NATIVE HEART WITHOUT ANGINA PECTORIS: Primary | ICD-10-CM

## 2018-12-19 DIAGNOSIS — I10 ESSENTIAL HYPERTENSION: ICD-10-CM

## 2018-12-19 DIAGNOSIS — G30.1 LATE ONSET ALZHEIMER'S DISEASE WITHOUT BEHAVIORAL DISTURBANCE (HCC): ICD-10-CM

## 2018-12-19 DIAGNOSIS — F02.80 LATE ONSET ALZHEIMER'S DISEASE WITHOUT BEHAVIORAL DISTURBANCE (HCC): ICD-10-CM

## 2018-12-19 PROCEDURE — G8399 PT W/DXA RESULTS DOCUMENT: HCPCS | Performed by: CLINICAL NURSE SPECIALIST

## 2018-12-19 PROCEDURE — 99213 OFFICE O/P EST LOW 20 MIN: CPT | Performed by: CLINICAL NURSE SPECIALIST

## 2018-12-19 PROCEDURE — G8484 FLU IMMUNIZE NO ADMIN: HCPCS | Performed by: CLINICAL NURSE SPECIALIST

## 2018-12-19 PROCEDURE — 1123F ACP DISCUSS/DSCN MKR DOCD: CPT | Performed by: CLINICAL NURSE SPECIALIST

## 2018-12-19 PROCEDURE — G8427 DOCREV CUR MEDS BY ELIG CLIN: HCPCS | Performed by: CLINICAL NURSE SPECIALIST

## 2018-12-19 PROCEDURE — G8598 ASA/ANTIPLAT THER USED: HCPCS | Performed by: CLINICAL NURSE SPECIALIST

## 2018-12-19 PROCEDURE — G8420 CALC BMI NORM PARAMETERS: HCPCS | Performed by: CLINICAL NURSE SPECIALIST

## 2018-12-19 PROCEDURE — 1090F PRES/ABSN URINE INCON ASSESS: CPT | Performed by: CLINICAL NURSE SPECIALIST

## 2018-12-19 PROCEDURE — 1036F TOBACCO NON-USER: CPT | Performed by: CLINICAL NURSE SPECIALIST

## 2018-12-19 PROCEDURE — 1101F PT FALLS ASSESS-DOCD LE1/YR: CPT | Performed by: CLINICAL NURSE SPECIALIST

## 2018-12-19 PROCEDURE — 4040F PNEUMOC VAC/ADMIN/RCVD: CPT | Performed by: CLINICAL NURSE SPECIALIST

## 2018-12-19 RX ORDER — ESCITALOPRAM OXALATE 20 MG/1
20 TABLET ORAL DAILY
COMMUNITY

## 2018-12-19 ASSESSMENT — ENCOUNTER SYMPTOMS
COUGH: 0
HEARTBURN: 0
BACK PAIN: 1
VOMITING: 0
NAUSEA: 0
BLOOD IN STOOL: 0

## 2018-12-19 NOTE — PROGRESS NOTES
 Coronary atherosclerosis of artery bypass graft 2011     Past Medical History:   Diagnosis Date    [facility] [address]     CAD (coronary artery disease), native coronary artery     Carotid artery stenosis 2014    Colon cancer [facility] [address]     Diabetes mellitus type II     Hyperlipidemia     Cholesterol management per pcp, Hilary Melton M.D.    Hypertension     S/P cardiac cath 11    Syncope 14    s/p loop recorder      Past Surgical History:   Procedure Laterality Date    APPENDECTOMY      CATARACT REMOVAL Left 10/2015     SECTION      CHOLECYSTECTOMY      CORONARY ANGIOPLASTY WITH STENT PLACEMENT  2018    BMS to prox Circ;  3 occluded vein grafts    CORONARY ARTERY BYPASS GRAFT  754731    LIMA to LAD (patent), SVG to intermediate branch (occluded); SVG to LAD diagonal (ocluded); SVG to RCA (subtotally occluded-stent placement )    DIAGNOSTIC CARDIAC CATH LAB PROCEDURE  099412    EF  60%    DIAGNOSTIC CARDIAC CATH LAB PROCEDURE  567223    EF 60%    DIAGNOSTIC CARDIAC CATH LAB PROCEDURE  00    EF 50%    MASTECTOMY       Right Breast    VASCULAR SURGERY  14 [facility]    Left carotid endarterectomy with vascular heart patch repair. Left cervical carotid arteriograms after endarterectomy.  VASCULAR SURGERY  14 [facility]    Right carotid endarterectomy with vascular patch repair. Right cervical carotid arteriograms after endarterectomy. Family History   Problem Relation Age of Onset    Coronary Art Dis Mother     Diabetes Mother     Hypertension Mother     Cancer Mother     Cancer Sister     Coronary Art Dis Brother     Cancer Brother     Coronary Art Dis Maternal Grandmother     Coronary Art Dis Maternal Grandfather     Diabetes Daughter      Social History   Substance Use Topics    Smoking status: Former Smoker    Smokeless tobacco: Never Used      Comment: quit around .     Alcohol use No      Current Outpatient Prescriptions   Medication Sig Dispense Refill    escitalopram (LEXAPRO) 20 MG tablet Take 20 mg by mouth daily      simvastatin (ZOCOR) 40 MG tablet Take 2 tablets by mouth nightly 30 tablet 5    metoprolol succinate (TOPROL XL) 25 MG extended release tablet Take 1 tablet by mouth daily 30 tablet 5    levETIRAcetam (KEPPRA) 250 MG tablet Take 1 tablet by mouth 2 times daily 60 tablet 3    clopidogrel (PLAVIX) 75 MG tablet Take 75 mg by mouth daily      Insulin Glargine (BASAGLAR KWIKPEN SC) Inject 25 Units into the skin nightly       aspirin EC 81 MG EC tablet Take 1 tablet by mouth daily 30 tablet 3    SITagliptin (JANUVIA) 50 MG tablet Take 0.5 tablets by mouth daily 30 tablet 3    doxepin (SINEQUAN) 10 MG capsule Take 10 mg by mouth nightly      Cholecalciferol (VITAMIN D3) 5000 units TABS Take by mouth      nitroGLYCERIN (NITROSTAT) 0.4 MG SL tablet Place 1 tablet under the tongue every 5 minutes as needed 25 tablet 3    lisinopril (PRINIVIL;ZESTRIL) 20 MG tablet Take 20 mg by mouth Daily       isosorbide mononitrate (IMDUR) 30 MG CR tablet Take 30 mg by mouth daily. No current facility-administered medications for this visit. Allergies: Tetanus toxoids    Review of Systems  Review of Systems   Constitutional: Negative for chills and fever. HENT: Negative for congestion. Respiratory: Negative for cough. Gastrointestinal: Negative for blood in stool, heartburn, nausea and vomiting. Musculoskeletal: Positive for back pain. Skin: Negative for rash. Neurological: Negative for sensory change, speech change and focal weakness. Episodes of numbness throughout total body   Endo/Heme/Allergies: Does not bruise/bleed easily. Psychiatric/Behavioral: Positive for depression. Negative for suicidal ideas. The patient is not nervous/anxious and does not have insomnia.         Objective  Vital Signs - /82   Pulse 68   Ht 5' 5\" (1.651 m)   Wt 137 lb (62.1 kg)   BMI 22.80 kg/m²   Physical Exam Constitutional: She is oriented to person, place, and time. She appears well-developed and well-nourished. No distress. disheveled   HENT:   Head: Normocephalic and atraumatic. Eyes: Pupils are equal, round, and reactive to light. Right eye exhibits no discharge. Left eye exhibits no discharge. Neck: No JVD present. No tracheal deviation present. Cardiovascular: Normal rate, regular rhythm, normal heart sounds and intact distal pulses. Exam reveals no gallop and no friction rub. No murmur heard. No carotid bruit   Pulmonary/Chest: Effort normal and breath sounds normal. No respiratory distress. She has no wheezes. She has no rales. Abdominal: Soft. There is no tenderness. Musculoskeletal: She exhibits no edema. Neurological: She is alert and oriented to person, place, and time. No cranial nerve deficit. Skin: Skin is warm and dry. No rash noted. Psychiatric: She has a normal mood and affect. Her behavior is normal.   Mild confusion and memory issues   Nursing note and vitals reviewed. Zio Patch 10/30/18  Impression:  This ZIO Patch shows no evidence of significant   supraventricular or ventricular ectopy and is without prolonged   Pauses. Cardiac Cath 11/2/18  Summary:     1.  Successful femoral artery ultrasound  2. Successful femoral artery arterogram  3. Triple vessel coronary artery disease involving the proximal LAD, proximal circumflex and RCA  4. Left ventricular function is normal   5.  80% lesion in the proximal circumflex  6. Successful percutaneous coronary intervention utilizing a single bare metal stent to the proximal circumflex. 7.  Occluded vein graft to the ramus intermedius  8. Occluded vein graft to the obtuse marginal  9. Occluded vein graft to the RCA  10. Patent, un grafted, right AGUSTIN     Echo 12/31/17  Summary   1. Normal bubble study   2.  Normal LV systolic function (estimated LV EF 60-65%)   3. Mild mitral regurgitation   4. Mild tricuspid regurgitation

## 2018-12-29 ENCOUNTER — HOSPITAL ENCOUNTER (INPATIENT)
Age: 78
LOS: 3 days | Discharge: HOME HEALTH | DRG: 684 | End: 2019-01-01
Attending: EMERGENCY MEDICINE | Admitting: FAMILY MEDICINE
Payer: MEDICARE

## 2018-12-29 DIAGNOSIS — N17.9 AKI (ACUTE KIDNEY INJURY) (HCC): Primary | ICD-10-CM

## 2018-12-29 DIAGNOSIS — R19.7 NAUSEA VOMITING AND DIARRHEA: ICD-10-CM

## 2018-12-29 DIAGNOSIS — R11.2 NAUSEA VOMITING AND DIARRHEA: ICD-10-CM

## 2018-12-29 LAB
ALBUMIN SERPL-MCNC: 4.2 G/DL (ref 3.5–5.2)
ALP BLD-CCNC: 74 U/L (ref 35–104)
ALT SERPL-CCNC: 12 U/L (ref 5–33)
ANION GAP SERPL CALCULATED.3IONS-SCNC: 15 MMOL/L (ref 7–19)
AST SERPL-CCNC: 17 U/L (ref 5–32)
BACTERIA: NEGATIVE /HPF
BASOPHILS ABSOLUTE: 0.1 K/UL (ref 0–0.2)
BASOPHILS RELATIVE PERCENT: 0.4 % (ref 0–1)
BILIRUB SERPL-MCNC: 0.7 MG/DL (ref 0.2–1.2)
BILIRUBIN URINE: ABNORMAL
BLOOD, URINE: NEGATIVE
BUN BLDV-MCNC: 26 MG/DL (ref 8–23)
CALCIUM SERPL-MCNC: 9.1 MG/DL (ref 8.8–10.2)
CHLORIDE BLD-SCNC: 100 MMOL/L (ref 98–111)
CLARITY: ABNORMAL
CO2: 20 MMOL/L (ref 22–29)
COLOR: ABNORMAL
CREAT SERPL-MCNC: 2 MG/DL (ref 0.5–0.9)
EOSINOPHILS ABSOLUTE: 0 K/UL (ref 0–0.6)
EOSINOPHILS RELATIVE PERCENT: 0.2 % (ref 0–5)
EPITHELIAL CELLS, UA: 1 /HPF (ref 0–5)
GFR NON-AFRICAN AMERICAN: 24
GLUCOSE BLD-MCNC: 245 MG/DL (ref 74–109)
GLUCOSE URINE: 250 MG/DL
HCT VFR BLD CALC: 42.9 % (ref 37–47)
HEMOGLOBIN: 14.1 G/DL (ref 12–16)
HYALINE CASTS: 2 /HPF (ref 0–8)
KETONES, URINE: ABNORMAL MG/DL
LEUKOCYTE ESTERASE, URINE: NEGATIVE
LYMPHOCYTES ABSOLUTE: 0.6 K/UL (ref 1.1–4.5)
LYMPHOCYTES RELATIVE PERCENT: 5.6 % (ref 20–40)
MCH RBC QN AUTO: 28.9 PG (ref 27–31)
MCHC RBC AUTO-ENTMCNC: 32.9 G/DL (ref 33–37)
MCV RBC AUTO: 87.9 FL (ref 81–99)
MONOCYTES ABSOLUTE: 0.5 K/UL (ref 0–0.9)
MONOCYTES RELATIVE PERCENT: 4 % (ref 0–10)
NEUTROPHILS ABSOLUTE: 10.1 K/UL (ref 1.5–7.5)
NEUTROPHILS RELATIVE PERCENT: 89.3 % (ref 50–65)
NITRITE, URINE: NEGATIVE
PDW BLD-RTO: 12.2 % (ref 11.5–14.5)
PH UA: 5.5
PLATELET # BLD: 208 K/UL (ref 130–400)
PMV BLD AUTO: 10.8 FL (ref 9.4–12.3)
POTASSIUM SERPL-SCNC: 5.1 MMOL/L (ref 3.5–5)
PROTEIN UA: 30 MG/DL
RBC # BLD: 4.88 M/UL (ref 4.2–5.4)
RBC UA: 1 /HPF (ref 0–4)
SODIUM BLD-SCNC: 135 MMOL/L (ref 136–145)
SPECIFIC GRAVITY UA: 1.02
TOTAL PROTEIN: 7.2 G/DL (ref 6.6–8.7)
URINE REFLEX TO CULTURE: ABNORMAL
UROBILINOGEN, URINE: 0.2 E.U./DL
WBC # BLD: 11.3 K/UL (ref 4.8–10.8)
WBC UA: 0 /HPF (ref 0–5)

## 2018-12-29 PROCEDURE — 85025 COMPLETE CBC W/AUTO DIFF WBC: CPT

## 2018-12-29 PROCEDURE — 2580000003 HC RX 258: Performed by: EMERGENCY MEDICINE

## 2018-12-29 PROCEDURE — 99284 EMERGENCY DEPT VISIT MOD MDM: CPT

## 2018-12-29 PROCEDURE — 99285 EMERGENCY DEPT VISIT HI MDM: CPT | Performed by: EMERGENCY MEDICINE

## 2018-12-29 PROCEDURE — 80053 COMPREHEN METABOLIC PANEL: CPT

## 2018-12-29 PROCEDURE — 36415 COLL VENOUS BLD VENIPUNCTURE: CPT

## 2018-12-29 PROCEDURE — 81001 URINALYSIS AUTO W/SCOPE: CPT

## 2018-12-29 PROCEDURE — 1210000000 HC MED SURG R&B

## 2018-12-29 RX ORDER — SODIUM CHLORIDE 9 MG/ML
INJECTION, SOLUTION INTRAVENOUS CONTINUOUS
Status: DISCONTINUED | OUTPATIENT
Start: 2018-12-29 | End: 2018-12-30 | Stop reason: ALTCHOICE

## 2018-12-29 RX ORDER — SODIUM CHLORIDE 0.9 % (FLUSH) 0.9 %
10 SYRINGE (ML) INJECTION EVERY 12 HOURS SCHEDULED
Status: DISCONTINUED | OUTPATIENT
Start: 2018-12-30 | End: 2019-01-01 | Stop reason: HOSPADM

## 2018-12-29 RX ORDER — SODIUM CHLORIDE 0.9 % (FLUSH) 0.9 %
10 SYRINGE (ML) INJECTION PRN
Status: DISCONTINUED | OUTPATIENT
Start: 2018-12-29 | End: 2019-01-01 | Stop reason: HOSPADM

## 2018-12-29 RX ORDER — ONDANSETRON 2 MG/ML
4 INJECTION INTRAMUSCULAR; INTRAVENOUS EVERY 8 HOURS PRN
Status: DISCONTINUED | OUTPATIENT
Start: 2018-12-29 | End: 2019-01-01 | Stop reason: HOSPADM

## 2018-12-29 RX ADMIN — SODIUM CHLORIDE: 9 INJECTION, SOLUTION INTRAVENOUS at 21:25

## 2018-12-29 ASSESSMENT — ENCOUNTER SYMPTOMS
NAUSEA: 1
DIARRHEA: 1
SHORTNESS OF BREATH: 0
VOMITING: 1
COUGH: 0
ABDOMINAL DISTENTION: 0

## 2018-12-29 ASSESSMENT — PAIN SCALES - GENERAL: PAINLEVEL_OUTOF10: 0

## 2018-12-30 PROBLEM — R11.10 VOMITING AND DIARRHEA: Status: ACTIVE | Noted: 2018-12-30

## 2018-12-30 PROBLEM — R19.7 VOMITING AND DIARRHEA: Status: ACTIVE | Noted: 2018-12-30

## 2018-12-30 LAB
ANION GAP SERPL CALCULATED.3IONS-SCNC: 16 MMOL/L (ref 7–19)
BUN BLDV-MCNC: 30 MG/DL (ref 8–23)
C DIFFICILE TOXIN, EIA: NORMAL
CALCIUM SERPL-MCNC: 8.4 MG/DL (ref 8.8–10.2)
CHLORIDE BLD-SCNC: 102 MMOL/L (ref 98–111)
CO2: 19 MMOL/L (ref 22–29)
CREAT SERPL-MCNC: 2.1 MG/DL (ref 0.5–0.9)
GFR NON-AFRICAN AMERICAN: 23
GLUCOSE BLD-MCNC: 157 MG/DL (ref 70–99)
GLUCOSE BLD-MCNC: 169 MG/DL (ref 70–99)
GLUCOSE BLD-MCNC: 186 MG/DL (ref 74–109)
GLUCOSE BLD-MCNC: 191 MG/DL (ref 70–99)
GLUCOSE BLD-MCNC: 197 MG/DL (ref 70–99)
GLUCOSE BLD-MCNC: 199 MG/DL (ref 70–99)
PERFORMED ON: ABNORMAL
POTASSIUM SERPL-SCNC: 4.4 MMOL/L (ref 3.5–5)
SODIUM BLD-SCNC: 137 MMOL/L (ref 136–145)

## 2018-12-30 PROCEDURE — 2580000003 HC RX 258: Performed by: FAMILY MEDICINE

## 2018-12-30 PROCEDURE — 80048 BASIC METABOLIC PNL TOTAL CA: CPT

## 2018-12-30 PROCEDURE — 82948 REAGENT STRIP/BLOOD GLUCOSE: CPT

## 2018-12-30 PROCEDURE — 36415 COLL VENOUS BLD VENIPUNCTURE: CPT

## 2018-12-30 PROCEDURE — 87324 CLOSTRIDIUM AG IA: CPT

## 2018-12-30 PROCEDURE — 6360000002 HC RX W HCPCS: Performed by: FAMILY MEDICINE

## 2018-12-30 PROCEDURE — 1210000000 HC MED SURG R&B

## 2018-12-30 PROCEDURE — 6370000000 HC RX 637 (ALT 250 FOR IP): Performed by: FAMILY MEDICINE

## 2018-12-30 PROCEDURE — 87449 NOS EACH ORGANISM AG IA: CPT

## 2018-12-30 RX ORDER — LEVETIRACETAM 250 MG/1
250 TABLET ORAL 2 TIMES DAILY
Status: DISCONTINUED | OUTPATIENT
Start: 2018-12-30 | End: 2019-01-01 | Stop reason: HOSPADM

## 2018-12-30 RX ORDER — ASPIRIN 81 MG/1
81 TABLET ORAL DAILY
Status: DISCONTINUED | OUTPATIENT
Start: 2018-12-30 | End: 2019-01-01 | Stop reason: HOSPADM

## 2018-12-30 RX ORDER — METOPROLOL SUCCINATE 25 MG/1
25 TABLET, EXTENDED RELEASE ORAL DAILY
Status: DISCONTINUED | OUTPATIENT
Start: 2018-12-30 | End: 2019-01-01 | Stop reason: HOSPADM

## 2018-12-30 RX ORDER — NICOTINE POLACRILEX 4 MG
15 LOZENGE BUCCAL PRN
Status: DISCONTINUED | OUTPATIENT
Start: 2018-12-30 | End: 2019-01-01 | Stop reason: HOSPADM

## 2018-12-30 RX ORDER — SIMVASTATIN 40 MG
80 TABLET ORAL NIGHTLY
Status: DISCONTINUED | OUTPATIENT
Start: 2018-12-30 | End: 2019-01-01 | Stop reason: HOSPADM

## 2018-12-30 RX ORDER — ESCITALOPRAM OXALATE 10 MG/1
20 TABLET ORAL DAILY
Status: DISCONTINUED | OUTPATIENT
Start: 2018-12-30 | End: 2019-01-01 | Stop reason: HOSPADM

## 2018-12-30 RX ORDER — DOXEPIN HYDROCHLORIDE 10 MG/1
10 CAPSULE ORAL NIGHTLY
Status: DISCONTINUED | OUTPATIENT
Start: 2018-12-30 | End: 2019-01-01 | Stop reason: HOSPADM

## 2018-12-30 RX ORDER — DEXTROSE MONOHYDRATE 50 MG/ML
100 INJECTION, SOLUTION INTRAVENOUS PRN
Status: DISCONTINUED | OUTPATIENT
Start: 2018-12-30 | End: 2019-01-01 | Stop reason: HOSPADM

## 2018-12-30 RX ORDER — INSULIN GLARGINE 100 [IU]/ML
25 INJECTION, SOLUTION SUBCUTANEOUS NIGHTLY
Status: DISCONTINUED | OUTPATIENT
Start: 2018-12-30 | End: 2019-01-01 | Stop reason: HOSPADM

## 2018-12-30 RX ORDER — SODIUM CHLORIDE 9 MG/ML
INJECTION, SOLUTION INTRAVENOUS CONTINUOUS
Status: DISCONTINUED | OUTPATIENT
Start: 2018-12-30 | End: 2019-01-01 | Stop reason: HOSPADM

## 2018-12-30 RX ORDER — ISOSORBIDE MONONITRATE 30 MG/1
30 TABLET, EXTENDED RELEASE ORAL DAILY
Status: DISCONTINUED | OUTPATIENT
Start: 2018-12-30 | End: 2019-01-01 | Stop reason: HOSPADM

## 2018-12-30 RX ORDER — DEXTROSE MONOHYDRATE 25 G/50ML
12.5 INJECTION, SOLUTION INTRAVENOUS PRN
Status: DISCONTINUED | OUTPATIENT
Start: 2018-12-30 | End: 2019-01-01 | Stop reason: HOSPADM

## 2018-12-30 RX ORDER — CLOPIDOGREL BISULFATE 75 MG/1
75 TABLET ORAL DAILY
Status: DISCONTINUED | OUTPATIENT
Start: 2018-12-30 | End: 2019-01-01 | Stop reason: HOSPADM

## 2018-12-30 RX ORDER — NITROGLYCERIN 0.4 MG/1
0.4 TABLET SUBLINGUAL EVERY 5 MIN PRN
Status: DISCONTINUED | OUTPATIENT
Start: 2018-12-30 | End: 2019-01-01 | Stop reason: HOSPADM

## 2018-12-30 RX ADMIN — SODIUM CHLORIDE: 9 INJECTION, SOLUTION INTRAVENOUS at 14:01

## 2018-12-30 RX ADMIN — LEVETIRACETAM 250 MG: 250 TABLET ORAL at 21:51

## 2018-12-30 RX ADMIN — SIMVASTATIN 80 MG: 40 TABLET, FILM COATED ORAL at 01:07

## 2018-12-30 RX ADMIN — SIMVASTATIN 80 MG: 40 TABLET, FILM COATED ORAL at 21:50

## 2018-12-30 RX ADMIN — CLOPIDOGREL BISULFATE 75 MG: 75 TABLET ORAL at 14:36

## 2018-12-30 RX ADMIN — ENOXAPARIN SODIUM 30 MG: 30 INJECTION SUBCUTANEOUS at 01:06

## 2018-12-30 RX ADMIN — INSULIN GLARGINE 25 UNITS: 100 INJECTION, SOLUTION SUBCUTANEOUS at 01:06

## 2018-12-30 RX ADMIN — LEVETIRACETAM 250 MG: 250 TABLET ORAL at 09:09

## 2018-12-30 RX ADMIN — ESCITALOPRAM OXALATE 20 MG: 10 TABLET ORAL at 14:36

## 2018-12-30 RX ADMIN — DOXEPIN HYDROCHLORIDE 10 MG: 10 CAPSULE ORAL at 21:51

## 2018-12-30 RX ADMIN — SODIUM CHLORIDE: 9 INJECTION, SOLUTION INTRAVENOUS at 01:07

## 2018-12-30 RX ADMIN — INSULIN GLARGINE 25 UNITS: 100 INJECTION, SOLUTION SUBCUTANEOUS at 21:50

## 2018-12-30 RX ADMIN — DOXEPIN HYDROCHLORIDE 10 MG: 10 CAPSULE ORAL at 01:07

## 2018-12-30 RX ADMIN — ASPIRIN 81 MG: 81 TABLET, COATED ORAL at 14:36

## 2018-12-30 RX ADMIN — INSULIN LISPRO 1 UNITS: 100 INJECTION, SOLUTION INTRAVENOUS; SUBCUTANEOUS at 21:50

## 2018-12-30 RX ADMIN — Medication 10 ML: at 09:00

## 2018-12-30 RX ADMIN — LEVETIRACETAM 250 MG: 250 TABLET ORAL at 01:07

## 2018-12-30 RX ADMIN — SODIUM CHLORIDE: 9 INJECTION, SOLUTION INTRAVENOUS at 21:50

## 2018-12-30 RX ADMIN — Medication 10 ML: at 21:51

## 2018-12-30 ASSESSMENT — PAIN SCALES - GENERAL
PAINLEVEL_OUTOF10: 0

## 2018-12-31 LAB
ANION GAP SERPL CALCULATED.3IONS-SCNC: 9 MMOL/L (ref 7–19)
BASOPHILS ABSOLUTE: 0 K/UL (ref 0–0.2)
BASOPHILS RELATIVE PERCENT: 1 % (ref 0–1)
BUN BLDV-MCNC: 24 MG/DL (ref 8–23)
CALCIUM SERPL-MCNC: 7.9 MG/DL (ref 8.8–10.2)
CHLORIDE BLD-SCNC: 114 MMOL/L (ref 98–111)
CO2: 19 MMOL/L (ref 22–29)
CREAT SERPL-MCNC: 1.4 MG/DL (ref 0.5–0.9)
EOSINOPHILS ABSOLUTE: 0.2 K/UL (ref 0–0.6)
EOSINOPHILS RELATIVE PERCENT: 6.2 % (ref 0–5)
GFR NON-AFRICAN AMERICAN: 36
GLUCOSE BLD-MCNC: 130 MG/DL (ref 70–99)
GLUCOSE BLD-MCNC: 173 MG/DL (ref 70–99)
GLUCOSE BLD-MCNC: 209 MG/DL (ref 70–99)
GLUCOSE BLD-MCNC: 91 MG/DL (ref 70–99)
GLUCOSE BLD-MCNC: 99 MG/DL (ref 74–109)
HCT VFR BLD CALC: 33.3 % (ref 37–47)
HEMOGLOBIN: 11 G/DL (ref 12–16)
LYMPHOCYTES ABSOLUTE: 1.3 K/UL (ref 1.1–4.5)
LYMPHOCYTES RELATIVE PERCENT: 34 % (ref 20–40)
MCH RBC QN AUTO: 28.9 PG (ref 27–31)
MCHC RBC AUTO-ENTMCNC: 33 G/DL (ref 33–37)
MCV RBC AUTO: 87.4 FL (ref 81–99)
MONOCYTES ABSOLUTE: 0.4 K/UL (ref 0–0.9)
MONOCYTES RELATIVE PERCENT: 11.4 % (ref 0–10)
NEUTROPHILS ABSOLUTE: 1.8 K/UL (ref 1.5–7.5)
NEUTROPHILS RELATIVE PERCENT: 46.9 % (ref 50–65)
PDW BLD-RTO: 12.6 % (ref 11.5–14.5)
PERFORMED ON: ABNORMAL
PERFORMED ON: NORMAL
PLATELET # BLD: 145 K/UL (ref 130–400)
PMV BLD AUTO: 10.6 FL (ref 9.4–12.3)
POTASSIUM SERPL-SCNC: 4.1 MMOL/L (ref 3.5–5)
RBC # BLD: 3.81 M/UL (ref 4.2–5.4)
SODIUM BLD-SCNC: 142 MMOL/L (ref 136–145)
WBC # BLD: 3.9 K/UL (ref 4.8–10.8)

## 2018-12-31 PROCEDURE — 80048 BASIC METABOLIC PNL TOTAL CA: CPT

## 2018-12-31 PROCEDURE — 6360000002 HC RX W HCPCS: Performed by: FAMILY MEDICINE

## 2018-12-31 PROCEDURE — 6370000000 HC RX 637 (ALT 250 FOR IP): Performed by: FAMILY MEDICINE

## 2018-12-31 PROCEDURE — 82948 REAGENT STRIP/BLOOD GLUCOSE: CPT

## 2018-12-31 PROCEDURE — 1210000000 HC MED SURG R&B

## 2018-12-31 PROCEDURE — 85025 COMPLETE CBC W/AUTO DIFF WBC: CPT

## 2018-12-31 PROCEDURE — 36415 COLL VENOUS BLD VENIPUNCTURE: CPT

## 2018-12-31 PROCEDURE — 2580000003 HC RX 258: Performed by: FAMILY MEDICINE

## 2018-12-31 RX ADMIN — LEVETIRACETAM 250 MG: 250 TABLET ORAL at 09:14

## 2018-12-31 RX ADMIN — Medication 10 ML: at 22:18

## 2018-12-31 RX ADMIN — INSULIN LISPRO 1 UNITS: 100 INJECTION, SOLUTION INTRAVENOUS; SUBCUTANEOUS at 22:21

## 2018-12-31 RX ADMIN — ISOSORBIDE MONONITRATE 30 MG: 30 TABLET, EXTENDED RELEASE ORAL at 09:14

## 2018-12-31 RX ADMIN — SODIUM CHLORIDE: 9 INJECTION, SOLUTION INTRAVENOUS at 21:50

## 2018-12-31 RX ADMIN — CLOPIDOGREL BISULFATE 75 MG: 75 TABLET ORAL at 09:14

## 2018-12-31 RX ADMIN — INSULIN GLARGINE 25 UNITS: 100 INJECTION, SOLUTION SUBCUTANEOUS at 22:18

## 2018-12-31 RX ADMIN — DOXEPIN HYDROCHLORIDE 10 MG: 10 CAPSULE ORAL at 22:18

## 2018-12-31 RX ADMIN — INSULIN LISPRO 2 UNITS: 100 INJECTION, SOLUTION INTRAVENOUS; SUBCUTANEOUS at 12:15

## 2018-12-31 RX ADMIN — LEVETIRACETAM 250 MG: 250 TABLET ORAL at 22:18

## 2018-12-31 RX ADMIN — SIMVASTATIN 80 MG: 40 TABLET, FILM COATED ORAL at 22:18

## 2018-12-31 RX ADMIN — ASPIRIN 81 MG: 81 TABLET, COATED ORAL at 09:14

## 2018-12-31 RX ADMIN — ENOXAPARIN SODIUM 30 MG: 30 INJECTION SUBCUTANEOUS at 09:14

## 2018-12-31 RX ADMIN — METOPROLOL SUCCINATE 25 MG: 25 TABLET, EXTENDED RELEASE ORAL at 09:14

## 2018-12-31 RX ADMIN — ESCITALOPRAM OXALATE 20 MG: 10 TABLET ORAL at 09:14

## 2018-12-31 ASSESSMENT — PAIN SCALES - GENERAL
PAINLEVEL_OUTOF10: 0

## 2019-01-01 ENCOUNTER — HOSPITAL ENCOUNTER (OUTPATIENT)
Age: 79
Setting detail: OBSERVATION
Discharge: HOME HEALTH | End: 2019-05-09
Attending: EMERGENCY MEDICINE | Admitting: FAMILY MEDICINE
Payer: MEDICARE

## 2019-01-01 ENCOUNTER — APPOINTMENT (OUTPATIENT)
Dept: GENERAL RADIOLOGY | Age: 79
End: 2019-01-01
Payer: MEDICARE

## 2019-01-01 ENCOUNTER — APPOINTMENT (OUTPATIENT)
Dept: CT IMAGING | Age: 79
End: 2019-01-01
Payer: MEDICARE

## 2019-01-01 ENCOUNTER — OFFICE VISIT (OUTPATIENT)
Dept: VASCULAR SURGERY | Age: 79
End: 2019-01-01
Payer: MEDICARE

## 2019-01-01 ENCOUNTER — HOSPITAL ENCOUNTER (EMERGENCY)
Age: 79
Discharge: HOME | End: 2019-07-06
Attending: FAMILY MEDICINE
Payer: MEDICARE

## 2019-01-01 ENCOUNTER — OFFICE VISIT (OUTPATIENT)
Dept: CARDIOLOGY | Age: 79
End: 2019-01-01
Payer: MEDICARE

## 2019-01-01 ENCOUNTER — APPOINTMENT (OUTPATIENT)
Dept: MRI IMAGING | Age: 79
End: 2019-01-01
Payer: MEDICARE

## 2019-01-01 ENCOUNTER — HOSPITAL ENCOUNTER (OUTPATIENT)
Age: 79
Setting detail: OBSERVATION
Discharge: HOME | End: 2019-07-04
Attending: EMERGENCY MEDICINE | Admitting: FAMILY MEDICINE
Payer: MEDICARE

## 2019-01-01 ENCOUNTER — HOSPITAL ENCOUNTER (EMERGENCY)
Age: 79
Discharge: HOME | End: 2019-11-11
Payer: MEDICARE

## 2019-01-01 ENCOUNTER — TELEPHONE (OUTPATIENT)
Dept: CARDIOLOGY | Age: 79
End: 2019-01-01

## 2019-01-01 VITALS
OXYGEN SATURATION: 95 % | RESPIRATION RATE: 18 BRPM | HEART RATE: 59 BPM | SYSTOLIC BLOOD PRESSURE: 120 MMHG | DIASTOLIC BLOOD PRESSURE: 64 MMHG

## 2019-01-01 VITALS
BODY MASS INDEX: 23.01 KG/M2 | OXYGEN SATURATION: 93 % | RESPIRATION RATE: 17 BRPM | SYSTOLIC BLOOD PRESSURE: 134 MMHG | TEMPERATURE: 97.4 F | HEART RATE: 57 BPM | DIASTOLIC BLOOD PRESSURE: 63 MMHG | HEIGHT: 65 IN | WEIGHT: 138.1 LBS

## 2019-01-01 VITALS
OXYGEN SATURATION: 97 % | TEMPERATURE: 97.7 F | WEIGHT: 134.6 LBS | DIASTOLIC BLOOD PRESSURE: 62 MMHG | HEART RATE: 59 BPM | HEIGHT: 65 IN | BODY MASS INDEX: 22.42 KG/M2 | SYSTOLIC BLOOD PRESSURE: 135 MMHG | RESPIRATION RATE: 16 BRPM

## 2019-01-01 VITALS
SYSTOLIC BLOOD PRESSURE: 155 MMHG | RESPIRATION RATE: 18 BRPM | WEIGHT: 140 LBS | HEIGHT: 65 IN | HEART RATE: 55 BPM | TEMPERATURE: 98 F | DIASTOLIC BLOOD PRESSURE: 48 MMHG | BODY MASS INDEX: 23.32 KG/M2 | OXYGEN SATURATION: 97 %

## 2019-01-01 VITALS
HEIGHT: 65 IN | BODY MASS INDEX: 21.99 KG/M2 | HEART RATE: 60 BPM | SYSTOLIC BLOOD PRESSURE: 128 MMHG | WEIGHT: 132 LBS | DIASTOLIC BLOOD PRESSURE: 64 MMHG

## 2019-01-01 VITALS
HEART RATE: 61 BPM | OXYGEN SATURATION: 96 % | RESPIRATION RATE: 18 BRPM | BODY MASS INDEX: 23.3 KG/M2 | TEMPERATURE: 97.3 F | DIASTOLIC BLOOD PRESSURE: 69 MMHG | SYSTOLIC BLOOD PRESSURE: 174 MMHG | WEIGHT: 140 LBS

## 2019-01-01 VITALS
HEIGHT: 65 IN | WEIGHT: 140 LBS | RESPIRATION RATE: 16 BRPM | SYSTOLIC BLOOD PRESSURE: 152 MMHG | TEMPERATURE: 97.4 F | DIASTOLIC BLOOD PRESSURE: 70 MMHG | BODY MASS INDEX: 23.32 KG/M2 | HEART RATE: 54 BPM | OXYGEN SATURATION: 97 %

## 2019-01-01 DIAGNOSIS — G30.1 LATE ONSET ALZHEIMER'S DISEASE WITHOUT BEHAVIORAL DISTURBANCE (HCC): ICD-10-CM

## 2019-01-01 DIAGNOSIS — R56.9 SEIZURE (HCC): Primary | ICD-10-CM

## 2019-01-01 DIAGNOSIS — R73.9 HYPERGLYCEMIA: Primary | ICD-10-CM

## 2019-01-01 DIAGNOSIS — F02.80 LATE ONSET ALZHEIMER'S DISEASE WITHOUT BEHAVIORAL DISTURBANCE (HCC): ICD-10-CM

## 2019-01-01 DIAGNOSIS — R73.9 HYPERGLYCEMIA: ICD-10-CM

## 2019-01-01 DIAGNOSIS — Z79.4 TYPE 2 DIABETES MELLITUS WITH COMPLICATION, WITH LONG-TERM CURRENT USE OF INSULIN (HCC): ICD-10-CM

## 2019-01-01 DIAGNOSIS — N39.0 URINARY TRACT INFECTION WITHOUT HEMATURIA, SITE UNSPECIFIED: ICD-10-CM

## 2019-01-01 DIAGNOSIS — I25.810 ATHEROSCLEROSIS OF CORONARY ARTERY BYPASS GRAFT OF NATIVE HEART WITHOUT ANGINA PECTORIS: Primary | ICD-10-CM

## 2019-01-01 DIAGNOSIS — R55 NEAR SYNCOPE: ICD-10-CM

## 2019-01-01 DIAGNOSIS — S00.83XA CONTUSION OF FACE, INITIAL ENCOUNTER: ICD-10-CM

## 2019-01-01 DIAGNOSIS — I10 ESSENTIAL HYPERTENSION: ICD-10-CM

## 2019-01-01 DIAGNOSIS — E11.8 TYPE 2 DIABETES MELLITUS WITH COMPLICATION, WITH LONG-TERM CURRENT USE OF INSULIN (HCC): ICD-10-CM

## 2019-01-01 DIAGNOSIS — R55 SYNCOPE, UNSPECIFIED SYNCOPE TYPE: Primary | ICD-10-CM

## 2019-01-01 DIAGNOSIS — S09.90XA INJURY OF HEAD, INITIAL ENCOUNTER: ICD-10-CM

## 2019-01-01 DIAGNOSIS — E87.1 HYPONATREMIA: ICD-10-CM
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
## 2019-01-01 DIAGNOSIS — I65.23 BILATERAL CAROTID ARTERY STENOSIS: Primary | ICD-10-CM

## 2019-01-01 LAB
ALBUMIN SERPL-MCNC: 3 G/DL (ref 3.5–5.2)
ALBUMIN SERPL-MCNC: 3.5 G/DL (ref 3.5–5.2)
ALBUMIN SERPL-MCNC: 3.6 G/DL (ref 3.5–5.2)
ALBUMIN SERPL-MCNC: 3.7 G/DL (ref 3.5–5.2)
ALP BLD-CCNC: 51 U/L (ref 35–104)
ALP BLD-CCNC: 56 U/L (ref 35–104)
ALP BLD-CCNC: 65 U/L (ref 35–104)
ALP BLD-CCNC: 96 U/L (ref 35–104)
ALT SERPL-CCNC: 10 U/L (ref 5–33)
ALT SERPL-CCNC: 15 U/L (ref 5–33)
ALT SERPL-CCNC: 7 U/L (ref 5–33)
ALT SERPL-CCNC: 9 U/L (ref 5–33)
ANION GAP SERPL CALCULATED.3IONS-SCNC: 10 MMOL/L (ref 7–19)
ANION GAP SERPL CALCULATED.3IONS-SCNC: 11 MMOL/L (ref 7–19)
ANION GAP SERPL CALCULATED.3IONS-SCNC: 12 MMOL/L (ref 7–19)
ANION GAP SERPL CALCULATED.3IONS-SCNC: 14 MMOL/L (ref 7–19)
ANION GAP SERPL CALCULATED.3IONS-SCNC: 7 MMOL/L (ref 7–19)
ANION GAP SERPL CALCULATED.3IONS-SCNC: 8 MMOL/L (ref 7–19)
ANION GAP SERPL CALCULATED.3IONS-SCNC: 8 MMOL/L (ref 7–19)
ANION GAP SERPL CALCULATED.3IONS-SCNC: 9 MMOL/L (ref 7–19)
ANION GAP SERPL CALCULATED.3IONS-SCNC: 9 MMOL/L (ref 7–19)
APTT: 22 SEC (ref 26–36.2)
APTT: 24.9 SEC (ref 26–36.2)
APTT: 27.1 SEC (ref 26–36.2)
AST SERPL-CCNC: 10 U/L (ref 5–32)
AST SERPL-CCNC: 15 U/L (ref 5–32)
AST SERPL-CCNC: 15 U/L (ref 5–32)
AST SERPL-CCNC: 17 U/L (ref 5–32)
BACTERIA: ABNORMAL /HPF
BACTERIA: NEGATIVE /HPF
BASE EXCESS ARTERIAL: -0.5 MMOL/L (ref -2–2)
BASOPHILS ABSOLUTE: 0.1 K/UL (ref 0–0.2)
BASOPHILS RELATIVE PERCENT: 0.9 % (ref 0–1)
BASOPHILS RELATIVE PERCENT: 1 % (ref 0–1)
BASOPHILS RELATIVE PERCENT: 1.1 % (ref 0–1)
BASOPHILS RELATIVE PERCENT: 1.1 % (ref 0–1)
BASOPHILS RELATIVE PERCENT: 1.4 % (ref 0–1)
BASOPHILS RELATIVE PERCENT: 1.5 % (ref 0–1)
BILIRUB SERPL-MCNC: 0.3 MG/DL (ref 0.2–1.2)
BILIRUB SERPL-MCNC: 0.4 MG/DL (ref 0.2–1.2)
BILIRUB SERPL-MCNC: 0.4 MG/DL (ref 0.2–1.2)
BILIRUB SERPL-MCNC: 0.5 MG/DL (ref 0.2–1.2)
BILIRUBIN URINE: ABNORMAL
BILIRUBIN URINE: NEGATIVE
BILIRUBIN URINE: NEGATIVE
BLOOD, URINE: NEGATIVE
BUN BLDV-MCNC: 11 MG/DL (ref 8–23)
BUN BLDV-MCNC: 14 MG/DL (ref 8–23)
BUN BLDV-MCNC: 16 MG/DL (ref 8–23)
BUN BLDV-MCNC: 17 MG/DL (ref 8–23)
BUN BLDV-MCNC: 17 MG/DL (ref 8–23)
BUN BLDV-MCNC: 19 MG/DL (ref 8–23)
BUN BLDV-MCNC: 20 MG/DL (ref 8–23)
BUN BLDV-MCNC: 21 MG/DL (ref 8–23)
BUN BLDV-MCNC: 22 MG/DL (ref 8–23)
CALCIUM SERPL-MCNC: 7.5 MG/DL (ref 8.8–10.2)
CALCIUM SERPL-MCNC: 7.5 MG/DL (ref 8.8–10.2)
CALCIUM SERPL-MCNC: 8.4 MG/DL (ref 8.8–10.2)
CALCIUM SERPL-MCNC: 8.5 MG/DL (ref 8.8–10.2)
CALCIUM SERPL-MCNC: 8.5 MG/DL (ref 8.8–10.2)
CALCIUM SERPL-MCNC: 8.7 MG/DL (ref 8.8–10.2)
CALCIUM SERPL-MCNC: 8.8 MG/DL (ref 8.8–10.2)
CALCIUM SERPL-MCNC: 9 MG/DL (ref 8.8–10.2)
CALCIUM SERPL-MCNC: 9.2 MG/DL (ref 8.8–10.2)
CALCIUM SERPL-MCNC: 9.2 MG/DL (ref 8.8–10.2)
CALCIUM SERPL-MCNC: 9.3 MG/DL (ref 8.8–10.2)
CARBOXYHEMOGLOBIN ARTERIAL: 1.2 % (ref 0–5)
CHLORIDE BLD-SCNC: 102 MMOL/L (ref 98–111)
CHLORIDE BLD-SCNC: 103 MMOL/L (ref 98–111)
CHLORIDE BLD-SCNC: 103 MMOL/L (ref 98–111)
CHLORIDE BLD-SCNC: 104 MMOL/L (ref 98–111)
CHLORIDE BLD-SCNC: 106 MMOL/L (ref 98–111)
CHLORIDE BLD-SCNC: 106 MMOL/L (ref 98–111)
CHLORIDE BLD-SCNC: 110 MMOL/L (ref 98–111)
CHLORIDE BLD-SCNC: 112 MMOL/L (ref 98–111)
CHLORIDE BLD-SCNC: 115 MMOL/L (ref 98–111)
CHLORIDE BLD-SCNC: 91 MMOL/L (ref 98–111)
CHLORIDE BLD-SCNC: 99 MMOL/L (ref 98–111)
CLARITY: ABNORMAL
CLARITY: CLEAR
CLARITY: CLEAR
CO2: 21 MMOL/L (ref 22–29)
CO2: 21 MMOL/L (ref 22–29)
CO2: 22 MMOL/L (ref 22–29)
CO2: 23 MMOL/L (ref 22–29)
CO2: 24 MMOL/L (ref 22–29)
CO2: 25 MMOL/L (ref 22–29)
CO2: 25 MMOL/L (ref 22–29)
CO2: 26 MMOL/L (ref 22–29)
CO2: 27 MMOL/L (ref 22–29)
COLOR: ABNORMAL
COLOR: YELLOW
COLOR: YELLOW
CREAT SERPL-MCNC: 0.9 MG/DL (ref 0.5–0.9)
CREAT SERPL-MCNC: 0.9 MG/DL (ref 0.5–0.9)
CREAT SERPL-MCNC: 1.1 MG/DL (ref 0.5–0.9)
CREAT SERPL-MCNC: 1.2 MG/DL (ref 0.5–0.9)
CREAT SERPL-MCNC: 1.4 MG/DL (ref 0.5–0.9)
EKG P AXIS: 66 DEGREES
EKG P-R INTERVAL: 166 MS
EKG Q-T INTERVAL: 384 MS
EKG QRS DURATION: 86 MS
EKG QTC CALCULATION (BAZETT): 426 MS
EKG T AXIS: 163 DEGREES
EOSINOPHILS ABSOLUTE: 0.1 K/UL (ref 0–0.6)
EOSINOPHILS ABSOLUTE: 0.3 K/UL (ref 0–0.6)
EOSINOPHILS ABSOLUTE: 0.6 K/UL (ref 0–0.6)
EOSINOPHILS RELATIVE PERCENT: 1.6 % (ref 0–5)
EOSINOPHILS RELATIVE PERCENT: 4.3 % (ref 0–5)
EOSINOPHILS RELATIVE PERCENT: 4.8 % (ref 0–5)
EOSINOPHILS RELATIVE PERCENT: 4.9 % (ref 0–5)
EOSINOPHILS RELATIVE PERCENT: 4.9 % (ref 0–5)
EOSINOPHILS RELATIVE PERCENT: 6.4 % (ref 0–5)
EPITHELIAL CELLS, UA: 2 /HPF (ref 0–5)
EPITHELIAL CELLS, UA: ABNORMAL /HPF
GFR NON-AFRICAN AMERICAN: 36
GFR NON-AFRICAN AMERICAN: 43
GFR NON-AFRICAN AMERICAN: 48
GFR NON-AFRICAN AMERICAN: 60
GFR NON-AFRICAN AMERICAN: >60
GLUCOSE BLD-MCNC: 102 MG/DL (ref 70–99)
GLUCOSE BLD-MCNC: 103 MG/DL (ref 74–109)
GLUCOSE BLD-MCNC: 136 MG/DL (ref 70–99)
GLUCOSE BLD-MCNC: 141 MG/DL (ref 70–99)
GLUCOSE BLD-MCNC: 148 MG/DL (ref 70–99)
GLUCOSE BLD-MCNC: 152 MG/DL (ref 70–99)
GLUCOSE BLD-MCNC: 163 MG/DL (ref 70–99)
GLUCOSE BLD-MCNC: 172 MG/DL (ref 70–99)
GLUCOSE BLD-MCNC: 174 MG/DL (ref 70–99)
GLUCOSE BLD-MCNC: 177 MG/DL (ref 74–109)
GLUCOSE BLD-MCNC: 177 MG/DL (ref 74–109)
GLUCOSE BLD-MCNC: 179 MG/DL (ref 70–99)
GLUCOSE BLD-MCNC: 179 MG/DL (ref 70–99)
GLUCOSE BLD-MCNC: 180 MG/DL (ref 74–109)
GLUCOSE BLD-MCNC: 183 MG/DL (ref 74–109)
GLUCOSE BLD-MCNC: 189 MG/DL (ref 70–99)
GLUCOSE BLD-MCNC: 191 MG/DL (ref 74–109)
GLUCOSE BLD-MCNC: 192 MG/DL (ref 70–99)
GLUCOSE BLD-MCNC: 197 MG/DL (ref 70–99)
GLUCOSE BLD-MCNC: 202 MG/DL (ref 74–109)
GLUCOSE BLD-MCNC: 207 MG/DL (ref 70–99)
GLUCOSE BLD-MCNC: 214 MG/DL (ref 70–99)
GLUCOSE BLD-MCNC: 223 MG/DL (ref 74–109)
GLUCOSE BLD-MCNC: 240 MG/DL (ref 70–99)
GLUCOSE BLD-MCNC: 251 MG/DL (ref 70–99)
GLUCOSE BLD-MCNC: 268 MG/DL (ref 74–109)
GLUCOSE BLD-MCNC: 274 MG/DL (ref 70–99)
GLUCOSE BLD-MCNC: 315 MG/DL (ref 70–99)
GLUCOSE BLD-MCNC: 322 MG/DL (ref 70–99)
GLUCOSE BLD-MCNC: 335 MG/DL (ref 74–109)
GLUCOSE BLD-MCNC: 355 MG/DL (ref 70–99)
GLUCOSE BLD-MCNC: 381 MG/DL (ref 70–99)
GLUCOSE BLD-MCNC: 768 MG/DL (ref 74–109)
GLUCOSE URINE: 100 MG/DL
GLUCOSE URINE: >=1000 MG/DL
GLUCOSE URINE: NEGATIVE MG/DL
HCO3 ARTERIAL: 24.2 MMOL/L (ref 22–26)
HCT VFR BLD CALC: 31.5 % (ref 37–47)
HCT VFR BLD CALC: 33 % (ref 37–47)
HCT VFR BLD CALC: 34.3 % (ref 37–47)
HCT VFR BLD CALC: 34.6 % (ref 37–47)
HCT VFR BLD CALC: 35.3 % (ref 37–47)
HCT VFR BLD CALC: 36.5 % (ref 37–47)
HCT VFR BLD CALC: 40.9 % (ref 37–47)
HEMOGLOBIN, ART, EXTENDED: 12.1 G/DL (ref 12–16)
HEMOGLOBIN: 10.2 G/DL (ref 12–16)
HEMOGLOBIN: 10.8 G/DL (ref 12–16)
HEMOGLOBIN: 11.1 G/DL (ref 12–16)
HEMOGLOBIN: 11.2 G/DL (ref 12–16)
HEMOGLOBIN: 11.2 G/DL (ref 12–16)
HEMOGLOBIN: 12.2 G/DL (ref 12–16)
HEMOGLOBIN: 13.9 G/DL (ref 12–16)
HYALINE CASTS: 8 /HPF (ref 0–8)
IMMATURE GRANULOCYTES #: 0 K/UL
INR BLD: 0.95 (ref 0.88–1.18)
INR BLD: 0.99 (ref 0.88–1.18)
INR BLD: 1.04 (ref 0.88–1.18)
KETONES, URINE: ABNORMAL MG/DL
KETONES, URINE: ABNORMAL MG/DL
KETONES, URINE: NEGATIVE MG/DL
LEUKOCYTE ESTERASE, URINE: ABNORMAL
LEUKOCYTE ESTERASE, URINE: ABNORMAL
LEUKOCYTE ESTERASE, URINE: NEGATIVE
LYMPHOCYTES ABSOLUTE: 1.1 K/UL (ref 1.1–4.5)
LYMPHOCYTES ABSOLUTE: 1.3 K/UL (ref 1.1–4.5)
LYMPHOCYTES ABSOLUTE: 1.4 K/UL (ref 1.1–4.5)
LYMPHOCYTES ABSOLUTE: 1.7 K/UL (ref 1.1–4.5)
LYMPHOCYTES ABSOLUTE: 1.9 K/UL (ref 1.1–4.5)
LYMPHOCYTES ABSOLUTE: 1.9 K/UL (ref 1.1–4.5)
LYMPHOCYTES RELATIVE PERCENT: 12.2 % (ref 20–40)
LYMPHOCYTES RELATIVE PERCENT: 14.8 % (ref 20–40)
LYMPHOCYTES RELATIVE PERCENT: 20.1 % (ref 20–40)
LYMPHOCYTES RELATIVE PERCENT: 31.8 % (ref 20–40)
LYMPHOCYTES RELATIVE PERCENT: 32 % (ref 20–40)
LYMPHOCYTES RELATIVE PERCENT: 32.2 % (ref 20–40)
MCH RBC QN AUTO: 28.4 PG (ref 27–31)
MCH RBC QN AUTO: 28.5 PG (ref 27–31)
MCH RBC QN AUTO: 28.8 PG (ref 27–31)
MCH RBC QN AUTO: 28.9 PG (ref 27–31)
MCH RBC QN AUTO: 29.1 PG (ref 27–31)
MCH RBC QN AUTO: 29.2 PG (ref 27–31)
MCH RBC QN AUTO: 29.3 PG (ref 27–31)
MCHC RBC AUTO-ENTMCNC: 31.7 G/DL (ref 33–37)
MCHC RBC AUTO-ENTMCNC: 32.1 G/DL (ref 33–37)
MCHC RBC AUTO-ENTMCNC: 32.4 G/DL (ref 33–37)
MCHC RBC AUTO-ENTMCNC: 32.7 G/DL (ref 33–37)
MCHC RBC AUTO-ENTMCNC: 32.7 G/DL (ref 33–37)
MCHC RBC AUTO-ENTMCNC: 33.4 G/DL (ref 33–37)
MCHC RBC AUTO-ENTMCNC: 34 G/DL (ref 33–37)
MCV RBC AUTO: 85.9 FL (ref 81–99)
MCV RBC AUTO: 87.5 FL (ref 81–99)
MCV RBC AUTO: 88.2 FL (ref 81–99)
MCV RBC AUTO: 88.7 FL (ref 81–99)
MCV RBC AUTO: 88.9 FL (ref 81–99)
MCV RBC AUTO: 89.2 FL (ref 81–99)
MCV RBC AUTO: 89.6 FL (ref 81–99)
METHEMOGLOBIN ARTERIAL: 1 %
MONOCYTES ABSOLUTE: 0.5 K/UL (ref 0–0.9)
MONOCYTES ABSOLUTE: 0.6 K/UL (ref 0–0.9)
MONOCYTES ABSOLUTE: 0.6 K/UL (ref 0–0.9)
MONOCYTES RELATIVE PERCENT: 5.4 % (ref 0–10)
MONOCYTES RELATIVE PERCENT: 7.3 % (ref 0–10)
MONOCYTES RELATIVE PERCENT: 8 % (ref 0–10)
MONOCYTES RELATIVE PERCENT: 8.4 % (ref 0–10)
MONOCYTES RELATIVE PERCENT: 8.5 % (ref 0–10)
MONOCYTES RELATIVE PERCENT: 8.7 % (ref 0–10)
NEUTROPHILS ABSOLUTE: 2.9 K/UL (ref 1.5–7.5)
NEUTROPHILS ABSOLUTE: 3.1 K/UL (ref 1.5–7.5)
NEUTROPHILS ABSOLUTE: 3.1 K/UL (ref 1.5–7.5)
NEUTROPHILS ABSOLUTE: 4.7 K/UL (ref 1.5–7.5)
NEUTROPHILS ABSOLUTE: 6 K/UL (ref 1.5–7.5)
NEUTROPHILS ABSOLUTE: 6.9 K/UL (ref 1.5–7.5)
NEUTROPHILS RELATIVE PERCENT: 52.7 % (ref 50–65)
NEUTROPHILS RELATIVE PERCENT: 52.9 % (ref 50–65)
NEUTROPHILS RELATIVE PERCENT: 53.8 % (ref 50–65)
NEUTROPHILS RELATIVE PERCENT: 66.2 % (ref 50–65)
NEUTROPHILS RELATIVE PERCENT: 70.2 % (ref 50–65)
NEUTROPHILS RELATIVE PERCENT: 79.6 % (ref 50–65)
NITRITE, URINE: NEGATIVE
O2 CONTENT ARTERIAL: 16.1 ML/DL
O2 SAT, ARTERIAL: 94.3 %
O2 THERAPY: ABNORMAL
ORGANISM: ABNORMAL
PCO2 ARTERIAL: 39 MMHG (ref 35–45)
PDW BLD-RTO: 12.1 % (ref 11.5–14.5)
PDW BLD-RTO: 12.2 % (ref 11.5–14.5)
PDW BLD-RTO: 12.3 % (ref 11.5–14.5)
PDW BLD-RTO: 12.3 % (ref 11.5–14.5)
PDW BLD-RTO: 12.4 % (ref 11.5–14.5)
PDW BLD-RTO: 12.4 % (ref 11.5–14.5)
PDW BLD-RTO: 12.6 % (ref 11.5–14.5)
PERFORMED ON: ABNORMAL
PH ARTERIAL: 7.4 (ref 7.35–7.45)
PH UA: 5.5 (ref 5–8)
PLATELET # BLD: 155 K/UL (ref 130–400)
PLATELET # BLD: 161 K/UL (ref 130–400)
PLATELET # BLD: 163 K/UL (ref 130–400)
PLATELET # BLD: 169 K/UL (ref 130–400)
PLATELET # BLD: 183 K/UL (ref 130–400)
PLATELET # BLD: 200 K/UL (ref 130–400)
PLATELET # BLD: 234 K/UL (ref 130–400)
PMV BLD AUTO: 10.1 FL (ref 9.4–12.3)
PMV BLD AUTO: 10.4 FL (ref 9.4–12.3)
PMV BLD AUTO: 10.5 FL (ref 9.4–12.3)
PMV BLD AUTO: 10.9 FL (ref 9.4–12.3)
PMV BLD AUTO: 11 FL (ref 9.4–12.3)
PMV BLD AUTO: 11.1 FL (ref 9.4–12.3)
PMV BLD AUTO: 9.9 FL (ref 9.4–12.3)
PO2 ARTERIAL: 71 MMHG (ref 80–100)
POTASSIUM REFLEX MAGNESIUM: 4.8 MMOL/L (ref 3.5–5)
POTASSIUM SERPL-SCNC: 3.8 MMOL/L (ref 3.5–5)
POTASSIUM SERPL-SCNC: 3.9 MMOL/L (ref 3.5–5)
POTASSIUM SERPL-SCNC: 4 MMOL/L (ref 3.5–5)
POTASSIUM SERPL-SCNC: 4.2 MMOL/L (ref 3.5–5)
POTASSIUM SERPL-SCNC: 4.3 MMOL/L (ref 3.5–5)
POTASSIUM SERPL-SCNC: 4.4 MMOL/L (ref 3.5–5)
POTASSIUM SERPL-SCNC: 4.4 MMOL/L (ref 3.5–5)
POTASSIUM SERPL-SCNC: 4.5 MMOL/L (ref 3.5–5)
POTASSIUM SERPL-SCNC: 4.5 MMOL/L (ref 3.5–5)
POTASSIUM SERPL-SCNC: 4.6 MMOL/L (ref 3.5–5)
POTASSIUM, WHOLE BLOOD: 4.4
PRO-BNP: 1025 PG/ML (ref 0–1800)
PRO-BNP: 1100 PG/ML (ref 0–1800)
PROTEIN UA: 100 MG/DL
PROTEIN UA: NEGATIVE MG/DL
PROTEIN UA: NEGATIVE MG/DL
PROTHROMBIN TIME: 12.1 SEC (ref 12–14.6)
PROTHROMBIN TIME: 12.5 SEC (ref 12–14.6)
PROTHROMBIN TIME: 13 SEC (ref 12–14.6)
RBC # BLD: 3.53 M/UL (ref 4.2–5.4)
RBC # BLD: 3.71 M/UL (ref 4.2–5.4)
RBC # BLD: 3.89 M/UL (ref 4.2–5.4)
RBC # BLD: 3.9 M/UL (ref 4.2–5.4)
RBC # BLD: 3.94 M/UL (ref 4.2–5.4)
RBC # BLD: 4.17 M/UL (ref 4.2–5.4)
RBC # BLD: 4.76 M/UL (ref 4.2–5.4)
RBC UA: 1 /HPF (ref 0–4)
RBC UA: ABNORMAL /HPF (ref 0–2)
SODIUM BLD-SCNC: 128 MMOL/L (ref 136–145)
SODIUM BLD-SCNC: 134 MMOL/L (ref 136–145)
SODIUM BLD-SCNC: 138 MMOL/L (ref 136–145)
SODIUM BLD-SCNC: 138 MMOL/L (ref 136–145)
SODIUM BLD-SCNC: 139 MMOL/L (ref 136–145)
SODIUM BLD-SCNC: 139 MMOL/L (ref 136–145)
SODIUM BLD-SCNC: 140 MMOL/L (ref 136–145)
SODIUM BLD-SCNC: 140 MMOL/L (ref 136–145)
SODIUM BLD-SCNC: 141 MMOL/L (ref 136–145)
SODIUM BLD-SCNC: 142 MMOL/L (ref 136–145)
SODIUM BLD-SCNC: 144 MMOL/L (ref 136–145)
SPECIFIC GRAVITY UA: 1.02 (ref 1–1.03)
SPECIFIC GRAVITY UA: 1.02 (ref 1–1.03)
SPECIFIC GRAVITY UA: 1.04 (ref 1–1.03)
T4 FREE: 0.8 NG/DL (ref 0.9–1.7)
T4 TOTAL: 6.5 UG/DL (ref 4.5–11.7)
TOTAL PROTEIN: 5.4 G/DL (ref 6.6–8.7)
TOTAL PROTEIN: 6.3 G/DL (ref 6.6–8.7)
TOTAL PROTEIN: 6.8 G/DL (ref 6.6–8.7)
TOTAL PROTEIN: 7.1 G/DL (ref 6.6–8.7)
TROPONIN: 0.03 NG/ML (ref 0–0.03)
TROPONIN: 0.04 NG/ML (ref 0–0.03)
TROPONIN: <0.01 NG/ML (ref 0–0.03)
TROPONIN: <0.01 NG/ML (ref 0–0.03)
TSH SERPL DL<=0.05 MIU/L-ACNC: 2.86 UIU/ML (ref 0.27–4.2)
TSH SERPL DL<=0.05 MIU/L-ACNC: 4.38 UIU/ML (ref 0.27–4.2)
URINE CULTURE, ROUTINE: ABNORMAL
URINE CULTURE, ROUTINE: ABNORMAL
URINE CULTURE, ROUTINE: NORMAL
URINE REFLEX TO CULTURE: ABNORMAL
URINE REFLEX TO CULTURE: YES
URINE REFLEX TO CULTURE: YES
UROBILINOGEN, URINE: 0.2 E.U./DL
VITAMIN B-12: 300 PG/ML (ref 211–946)
WBC # BLD: 5.4 K/UL (ref 4.8–10.8)
WBC # BLD: 5.9 K/UL (ref 4.8–10.8)
WBC # BLD: 5.9 K/UL (ref 4.8–10.8)
WBC # BLD: 6.2 K/UL (ref 4.8–10.8)
WBC # BLD: 7 K/UL (ref 4.8–10.8)
WBC # BLD: 8.5 K/UL (ref 4.8–10.8)
WBC # BLD: 8.7 K/UL (ref 4.8–10.8)
WBC UA: 16 /HPF (ref 0–5)
WBC UA: ABNORMAL /HPF (ref 0–5)

## 2019-01-01 PROCEDURE — G0378 HOSPITAL OBSERVATION PER HR: HCPCS

## 2019-01-01 PROCEDURE — 6370000000 HC RX 637 (ALT 250 FOR IP): Performed by: FAMILY MEDICINE

## 2019-01-01 PROCEDURE — 80053 COMPREHEN METABOLIC PANEL: CPT

## 2019-01-01 PROCEDURE — 99225 PR SBSQ OBSERVATION CARE/DAY 25 MINUTES: CPT | Performed by: PSYCHIATRY & NEUROLOGY

## 2019-01-01 PROCEDURE — 1036F TOBACCO NON-USER: CPT | Performed by: PHYSICIAN ASSISTANT

## 2019-01-01 PROCEDURE — 99226 PR SBSQ OBSERVATION CARE/DAY 35 MINUTES: CPT | Performed by: PSYCHIATRY & NEUROLOGY

## 2019-01-01 PROCEDURE — 80048 BASIC METABOLIC PNL TOTAL CA: CPT

## 2019-01-01 PROCEDURE — 84439 ASSAY OF FREE THYROXINE: CPT

## 2019-01-01 PROCEDURE — 6370000000 HC RX 637 (ALT 250 FOR IP): Performed by: PSYCHIATRY & NEUROLOGY

## 2019-01-01 PROCEDURE — 36415 COLL VENOUS BLD VENIPUNCTURE: CPT

## 2019-01-01 PROCEDURE — 1123F ACP DISCUSS/DSCN MKR DOCD: CPT | Performed by: CLINICAL NURSE SPECIALIST

## 2019-01-01 PROCEDURE — 82948 REAGENT STRIP/BLOOD GLUCOSE: CPT

## 2019-01-01 PROCEDURE — 99285 EMERGENCY DEPT VISIT HI MDM: CPT

## 2019-01-01 PROCEDURE — 85610 PROTHROMBIN TIME: CPT

## 2019-01-01 PROCEDURE — 85025 COMPLETE CBC W/AUTO DIFF WBC: CPT

## 2019-01-01 PROCEDURE — 82607 VITAMIN B-12: CPT

## 2019-01-01 PROCEDURE — 71045 X-RAY EXAM CHEST 1 VIEW: CPT

## 2019-01-01 PROCEDURE — G8420 CALC BMI NORM PARAMETERS: HCPCS | Performed by: CLINICAL NURSE SPECIALIST

## 2019-01-01 PROCEDURE — 81001 URINALYSIS AUTO W/SCOPE: CPT

## 2019-01-01 PROCEDURE — 6370000000 HC RX 637 (ALT 250 FOR IP): Performed by: EMERGENCY MEDICINE

## 2019-01-01 PROCEDURE — 70450 CT HEAD/BRAIN W/O DYE: CPT

## 2019-01-01 PROCEDURE — 96372 THER/PROPH/DIAG INJ SC/IM: CPT

## 2019-01-01 PROCEDURE — 99284 EMERGENCY DEPT VISIT MOD MDM: CPT | Performed by: FAMILY MEDICINE

## 2019-01-01 PROCEDURE — 2580000003 HC RX 258: Performed by: EMERGENCY MEDICINE

## 2019-01-01 PROCEDURE — 96361 HYDRATE IV INFUSION ADD-ON: CPT

## 2019-01-01 PROCEDURE — 84484 ASSAY OF TROPONIN QUANT: CPT

## 2019-01-01 PROCEDURE — 84443 ASSAY THYROID STIM HORMONE: CPT

## 2019-01-01 PROCEDURE — 93880 EXTRACRANIAL BILAT STUDY: CPT

## 2019-01-01 PROCEDURE — G8598 ASA/ANTIPLAT THER USED: HCPCS | Performed by: PHYSICIAN ASSISTANT

## 2019-01-01 PROCEDURE — G8427 DOCREV CUR MEDS BY ELIG CLIN: HCPCS | Performed by: PHYSICIAN ASSISTANT

## 2019-01-01 PROCEDURE — 83880 ASSAY OF NATRIURETIC PEPTIDE: CPT

## 2019-01-01 PROCEDURE — 96374 THER/PROPH/DIAG INJ IV PUSH: CPT

## 2019-01-01 PROCEDURE — 72125 CT NECK SPINE W/O DYE: CPT

## 2019-01-01 PROCEDURE — 6360000002 HC RX W HCPCS: Performed by: FAMILY MEDICINE

## 2019-01-01 PROCEDURE — 85027 COMPLETE CBC AUTOMATED: CPT

## 2019-01-01 PROCEDURE — 85730 THROMBOPLASTIN TIME PARTIAL: CPT

## 2019-01-01 PROCEDURE — 82803 BLOOD GASES ANY COMBINATION: CPT

## 2019-01-01 PROCEDURE — 99213 OFFICE O/P EST LOW 20 MIN: CPT | Performed by: CLINICAL NURSE SPECIALIST

## 2019-01-01 PROCEDURE — 36600 WITHDRAWAL OF ARTERIAL BLOOD: CPT

## 2019-01-01 PROCEDURE — 84132 ASSAY OF SERUM POTASSIUM: CPT

## 2019-01-01 PROCEDURE — 96376 TX/PRO/DX INJ SAME DRUG ADON: CPT

## 2019-01-01 PROCEDURE — 87086 URINE CULTURE/COLONY COUNT: CPT

## 2019-01-01 PROCEDURE — 99212 OFFICE O/P EST SF 10 MIN: CPT | Performed by: PHYSICIAN ASSISTANT

## 2019-01-01 PROCEDURE — 2580000003 HC RX 258: Performed by: FAMILY MEDICINE

## 2019-01-01 PROCEDURE — 87077 CULTURE AEROBIC IDENTIFY: CPT

## 2019-01-01 PROCEDURE — 4040F PNEUMOC VAC/ADMIN/RCVD: CPT | Performed by: CLINICAL NURSE SPECIALIST

## 2019-01-01 PROCEDURE — G8399 PT W/DXA RESULTS DOCUMENT: HCPCS | Performed by: CLINICAL NURSE SPECIALIST

## 2019-01-01 PROCEDURE — 99285 EMERGENCY DEPT VISIT HI MDM: CPT | Performed by: EMERGENCY MEDICINE

## 2019-01-01 PROCEDURE — 99284 EMERGENCY DEPT VISIT MOD MDM: CPT

## 2019-01-01 PROCEDURE — 1090F PRES/ABSN URINE INCON ASSESS: CPT | Performed by: CLINICAL NURSE SPECIALIST

## 2019-01-01 PROCEDURE — 71046 X-RAY EXAM CHEST 2 VIEWS: CPT

## 2019-01-01 PROCEDURE — 93005 ELECTROCARDIOGRAM TRACING: CPT

## 2019-01-01 PROCEDURE — G8598 ASA/ANTIPLAT THER USED: HCPCS | Performed by: CLINICAL NURSE SPECIALIST

## 2019-01-01 PROCEDURE — 81003 URINALYSIS AUTO W/O SCOPE: CPT

## 2019-01-01 PROCEDURE — G8399 PT W/DXA RESULTS DOCUMENT: HCPCS | Performed by: PHYSICIAN ASSISTANT

## 2019-01-01 PROCEDURE — G8427 DOCREV CUR MEDS BY ELIG CLIN: HCPCS | Performed by: CLINICAL NURSE SPECIALIST

## 2019-01-01 PROCEDURE — 70486 CT MAXILLOFACIAL W/O DYE: CPT

## 2019-01-01 PROCEDURE — 70551 MRI BRAIN STEM W/O DYE: CPT

## 2019-01-01 PROCEDURE — G8420 CALC BMI NORM PARAMETERS: HCPCS | Performed by: PHYSICIAN ASSISTANT

## 2019-01-01 PROCEDURE — 1090F PRES/ABSN URINE INCON ASSESS: CPT | Performed by: PHYSICIAN ASSISTANT

## 2019-01-01 PROCEDURE — 84436 ASSAY OF TOTAL THYROXINE: CPT

## 2019-01-01 PROCEDURE — 95816 EEG AWAKE AND DROWSY: CPT | Performed by: PSYCHIATRY & NEUROLOGY

## 2019-01-01 PROCEDURE — 1123F ACP DISCUSS/DSCN MKR DOCD: CPT | Performed by: PHYSICIAN ASSISTANT

## 2019-01-01 PROCEDURE — 95816 EEG AWAKE AND DROWSY: CPT

## 2019-01-01 PROCEDURE — 6360000002 HC RX W HCPCS: Performed by: EMERGENCY MEDICINE

## 2019-01-01 PROCEDURE — 1036F TOBACCO NON-USER: CPT | Performed by: CLINICAL NURSE SPECIALIST

## 2019-01-01 PROCEDURE — 4040F PNEUMOC VAC/ADMIN/RCVD: CPT | Performed by: PHYSICIAN ASSISTANT

## 2019-01-01 RX ORDER — LEVETIRACETAM 250 MG/1
250 TABLET ORAL NIGHTLY
Status: DISCONTINUED | OUTPATIENT
Start: 2019-01-01 | End: 2019-01-01

## 2019-01-01 RX ORDER — ISOSORBIDE MONONITRATE 30 MG/1
30 TABLET, EXTENDED RELEASE ORAL DAILY
Qty: 90 TABLET | Refills: 3 | Status: SHIPPED | OUTPATIENT
Start: 2019-01-01 | End: 2020-01-01

## 2019-01-01 RX ORDER — SODIUM CHLORIDE 0.9 % (FLUSH) 0.9 %
10 SYRINGE (ML) INJECTION PRN
Status: DISCONTINUED | OUTPATIENT
Start: 2019-01-01 | End: 2019-01-01 | Stop reason: HOSPADM

## 2019-01-01 RX ORDER — SODIUM CHLORIDE 9 MG/ML
INJECTION, SOLUTION INTRAVENOUS CONTINUOUS
Status: DISCONTINUED | OUTPATIENT
Start: 2019-01-01 | End: 2019-01-01 | Stop reason: HOSPADM

## 2019-01-01 RX ORDER — METOPROLOL SUCCINATE 25 MG/1
TABLET, EXTENDED RELEASE ORAL
Qty: 90 TABLET | Refills: 3 | Status: SHIPPED | OUTPATIENT
Start: 2019-01-01

## 2019-01-01 RX ORDER — DEXTROSE MONOHYDRATE 25 G/50ML
12.5 INJECTION, SOLUTION INTRAVENOUS PRN
Status: DISCONTINUED | OUTPATIENT
Start: 2019-01-01 | End: 2019-01-01 | Stop reason: HOSPADM

## 2019-01-01 RX ORDER — INSULIN GLARGINE 100 [IU]/ML
25 INJECTION, SOLUTION SUBCUTANEOUS NIGHTLY
Status: DISCONTINUED | OUTPATIENT
Start: 2019-01-01 | End: 2019-01-01 | Stop reason: HOSPADM

## 2019-01-01 RX ORDER — DOXEPIN HYDROCHLORIDE 10 MG/1
10 CAPSULE ORAL NIGHTLY
Status: DISCONTINUED | OUTPATIENT
Start: 2019-01-01 | End: 2019-01-01 | Stop reason: HOSPADM

## 2019-01-01 RX ORDER — NICOTINE POLACRILEX 4 MG
15 LOZENGE BUCCAL PRN
Status: DISCONTINUED | OUTPATIENT
Start: 2019-01-01 | End: 2019-01-01 | Stop reason: HOSPADM

## 2019-01-01 RX ORDER — LEVETIRACETAM 500 MG/1
500 TABLET ORAL 2 TIMES DAILY
Qty: 60 TABLET | Refills: 5 | Status: SHIPPED | OUTPATIENT
Start: 2019-01-01 | End: 2019-01-01 | Stop reason: SDUPTHER

## 2019-01-01 RX ORDER — ISOSORBIDE MONONITRATE 60 MG/1
30 TABLET, EXTENDED RELEASE ORAL DAILY
Status: DISCONTINUED | OUTPATIENT
Start: 2019-01-01 | End: 2019-01-01 | Stop reason: HOSPADM

## 2019-01-01 RX ORDER — LORAZEPAM 2 MG/ML
1 INJECTION INTRAMUSCULAR PRN
Status: DISCONTINUED | OUTPATIENT
Start: 2019-01-01 | End: 2019-01-01 | Stop reason: HOSPADM

## 2019-01-01 RX ORDER — SIMVASTATIN 40 MG
80 TABLET ORAL NIGHTLY
Status: DISCONTINUED | OUTPATIENT
Start: 2019-01-01 | End: 2019-01-01 | Stop reason: HOSPADM

## 2019-01-01 RX ORDER — 0.9 % SODIUM CHLORIDE 0.9 %
1000 INTRAVENOUS SOLUTION INTRAVENOUS ONCE
Status: COMPLETED | OUTPATIENT
Start: 2019-01-01 | End: 2019-01-01

## 2019-01-01 RX ORDER — LEVETIRACETAM 250 MG/1
250 TABLET ORAL NIGHTLY
Status: DISCONTINUED | OUTPATIENT
Start: 2019-01-01 | End: 2019-01-01 | Stop reason: HOSPADM

## 2019-01-01 RX ORDER — SIMVASTATIN 40 MG
40 TABLET ORAL NIGHTLY
Status: DISCONTINUED | OUTPATIENT
Start: 2019-01-01 | End: 2019-01-01 | Stop reason: HOSPADM

## 2019-01-01 RX ORDER — METOPROLOL SUCCINATE 25 MG/1
25 TABLET, EXTENDED RELEASE ORAL DAILY
Status: DISCONTINUED | OUTPATIENT
Start: 2019-01-01 | End: 2019-01-01 | Stop reason: HOSPADM

## 2019-01-01 RX ORDER — ISOSORBIDE MONONITRATE 30 MG/1
30 TABLET, EXTENDED RELEASE ORAL DAILY
Status: DISCONTINUED | OUTPATIENT
Start: 2019-01-01 | End: 2019-01-01 | Stop reason: HOSPADM

## 2019-01-01 RX ORDER — CLOPIDOGREL BISULFATE 75 MG/1
75 TABLET ORAL DAILY
Status: DISCONTINUED | OUTPATIENT
Start: 2019-01-01 | End: 2019-01-01 | Stop reason: HOSPADM

## 2019-01-01 RX ORDER — ESCITALOPRAM OXALATE 10 MG/1
20 TABLET ORAL DAILY
Status: DISCONTINUED | OUTPATIENT
Start: 2019-01-01 | End: 2019-01-01 | Stop reason: HOSPADM

## 2019-01-01 RX ORDER — SODIUM CHLORIDE 0.9 % (FLUSH) 0.9 %
10 SYRINGE (ML) INJECTION EVERY 12 HOURS SCHEDULED
Status: DISCONTINUED | OUTPATIENT
Start: 2019-01-01 | End: 2019-01-01 | Stop reason: HOSPADM

## 2019-01-01 RX ORDER — ASPIRIN 81 MG/1
81 TABLET ORAL DAILY
Status: DISCONTINUED | OUTPATIENT
Start: 2019-01-01 | End: 2019-01-01 | Stop reason: HOSPADM

## 2019-01-01 RX ORDER — LISINOPRIL 20 MG/1
20 TABLET ORAL DAILY
Status: DISCONTINUED | OUTPATIENT
Start: 2019-01-01 | End: 2019-01-01 | Stop reason: HOSPADM

## 2019-01-01 RX ORDER — DEXTROSE MONOHYDRATE 50 MG/ML
100 INJECTION, SOLUTION INTRAVENOUS PRN
Status: DISCONTINUED | OUTPATIENT
Start: 2019-01-01 | End: 2019-01-01 | Stop reason: HOSPADM

## 2019-01-01 RX ORDER — LEVETIRACETAM 500 MG/1
500 TABLET ORAL 3 TIMES DAILY
Qty: 60 TABLET | Refills: 5 | Status: SHIPPED | OUTPATIENT
Start: 2019-01-01

## 2019-01-01 RX ORDER — LEVETIRACETAM 500 MG/1
500 TABLET ORAL 2 TIMES DAILY
Status: DISCONTINUED | OUTPATIENT
Start: 2019-01-01 | End: 2019-01-01 | Stop reason: HOSPADM

## 2019-01-01 RX ORDER — NITROGLYCERIN 0.4 MG/1
0.4 TABLET SUBLINGUAL EVERY 5 MIN PRN
Status: DISCONTINUED | OUTPATIENT
Start: 2019-01-01 | End: 2019-01-01 | Stop reason: HOSPADM

## 2019-01-01 RX ADMIN — METOPROLOL SUCCINATE 25 MG: 25 TABLET, EXTENDED RELEASE ORAL at 10:01

## 2019-01-01 RX ADMIN — ESCITALOPRAM OXALATE 20 MG: 10 TABLET ORAL at 09:04

## 2019-01-01 RX ADMIN — DOXEPIN HYDROCHLORIDE 10 MG: 10 CAPSULE ORAL at 21:26

## 2019-01-01 RX ADMIN — LINAGLIPTIN 5 MG: 5 TABLET, FILM COATED ORAL at 10:00

## 2019-01-01 RX ADMIN — LEVETIRACETAM 500 MG: 500 TABLET ORAL at 08:02

## 2019-01-01 RX ADMIN — LISINOPRIL 20 MG: 20 TABLET ORAL at 08:51

## 2019-01-01 RX ADMIN — ASPIRIN 81 MG: 81 TABLET, COATED ORAL at 08:58

## 2019-01-01 RX ADMIN — Medication 10 ML: at 08:58

## 2019-01-01 RX ADMIN — ESCITALOPRAM OXALATE 20 MG: 10 TABLET ORAL at 08:58

## 2019-01-01 RX ADMIN — DOXEPIN HYDROCHLORIDE 10 MG: 10 CAPSULE ORAL at 20:46

## 2019-01-01 RX ADMIN — DOXEPIN HYDROCHLORIDE 10 MG: 10 CAPSULE ORAL at 21:31

## 2019-01-01 RX ADMIN — INSULIN LISPRO 3 UNITS: 100 INJECTION, SOLUTION INTRAVENOUS; SUBCUTANEOUS at 09:09

## 2019-01-01 RX ADMIN — METOPROLOL SUCCINATE 25 MG: 25 TABLET, EXTENDED RELEASE ORAL at 08:03

## 2019-01-01 RX ADMIN — LEVETIRACETAM 500 MG: 500 TABLET ORAL at 10:01

## 2019-01-01 RX ADMIN — INSULIN LISPRO 2 UNITS: 100 INJECTION, SOLUTION INTRAVENOUS; SUBCUTANEOUS at 10:18

## 2019-01-01 RX ADMIN — ASPIRIN 81 MG: 81 TABLET ORAL at 08:51

## 2019-01-01 RX ADMIN — LEVETIRACETAM 250 MG: 250 TABLET ORAL at 08:58

## 2019-01-01 RX ADMIN — ENOXAPARIN SODIUM 30 MG: 30 INJECTION SUBCUTANEOUS at 08:58

## 2019-01-01 RX ADMIN — METOPROLOL SUCCINATE 25 MG: 25 TABLET, EXTENDED RELEASE ORAL at 08:51

## 2019-01-01 RX ADMIN — ISOSORBIDE MONONITRATE 30 MG: 30 TABLET, EXTENDED RELEASE ORAL at 08:51

## 2019-01-01 RX ADMIN — DOXEPIN HYDROCHLORIDE 10 MG: 10 CAPSULE ORAL at 21:05

## 2019-01-01 RX ADMIN — LINAGLIPTIN 5 MG: 5 TABLET, FILM COATED ORAL at 17:23

## 2019-01-01 RX ADMIN — ASPIRIN 81 MG: 81 TABLET ORAL at 09:04

## 2019-01-01 RX ADMIN — CLOPIDOGREL BISULFATE 75 MG: 75 TABLET ORAL at 09:04

## 2019-01-01 RX ADMIN — ASPIRIN 81 MG: 81 TABLET ORAL at 10:00

## 2019-01-01 RX ADMIN — LISINOPRIL 20 MG: 20 TABLET ORAL at 08:03

## 2019-01-01 RX ADMIN — ENOXAPARIN SODIUM 40 MG: 40 INJECTION SUBCUTANEOUS at 10:01

## 2019-01-01 RX ADMIN — LEVETIRACETAM 250 MG: 250 TABLET ORAL at 21:05

## 2019-01-01 RX ADMIN — SIMVASTATIN 80 MG: 40 TABLET, FILM COATED ORAL at 21:31

## 2019-01-01 RX ADMIN — Medication 1 G: at 16:03

## 2019-01-01 RX ADMIN — INSULIN GLARGINE 25 UNITS: 100 INJECTION, SOLUTION SUBCUTANEOUS at 21:27

## 2019-01-01 RX ADMIN — LINAGLIPTIN 5 MG: 5 TABLET, FILM COATED ORAL at 08:02

## 2019-01-01 RX ADMIN — CLOPIDOGREL BISULFATE 75 MG: 75 TABLET ORAL at 08:58

## 2019-01-01 RX ADMIN — INSULIN LISPRO 3 UNITS: 100 INJECTION, SOLUTION INTRAVENOUS; SUBCUTANEOUS at 08:55

## 2019-01-01 RX ADMIN — METOPROLOL SUCCINATE 25 MG: 25 TABLET, EXTENDED RELEASE ORAL at 08:58

## 2019-01-01 RX ADMIN — ESCITALOPRAM OXALATE 20 MG: 10 TABLET ORAL at 10:00

## 2019-01-01 RX ADMIN — Medication 10 ML: at 21:31

## 2019-01-01 RX ADMIN — SIMVASTATIN 40 MG: 40 TABLET, FILM COATED ORAL at 21:06

## 2019-01-01 RX ADMIN — INSULIN LISPRO 15 UNITS: 100 INJECTION, SOLUTION INTRAVENOUS; SUBCUTANEOUS at 13:14

## 2019-01-01 RX ADMIN — ISOSORBIDE MONONITRATE 30 MG: 30 TABLET, EXTENDED RELEASE ORAL at 09:04

## 2019-01-01 RX ADMIN — LEVETIRACETAM 500 MG: 500 TABLET ORAL at 21:31

## 2019-01-01 RX ADMIN — INSULIN LISPRO 12 UNITS: 100 INJECTION, SOLUTION INTRAVENOUS; SUBCUTANEOUS at 17:39

## 2019-01-01 RX ADMIN — Medication 10 ML: at 21:27

## 2019-01-01 RX ADMIN — ESCITALOPRAM OXALATE 20 MG: 10 TABLET ORAL at 08:03

## 2019-01-01 RX ADMIN — INSULIN GLARGINE 25 UNITS: 100 INJECTION, SOLUTION SUBCUTANEOUS at 21:31

## 2019-01-01 RX ADMIN — INSULIN LISPRO 2 UNITS: 100 INJECTION, SOLUTION INTRAVENOUS; SUBCUTANEOUS at 08:20

## 2019-01-01 RX ADMIN — ISOSORBIDE MONONITRATE 30 MG: 60 TABLET, EXTENDED RELEASE ORAL at 08:02

## 2019-01-01 RX ADMIN — CLOPIDOGREL BISULFATE 75 MG: 75 TABLET ORAL at 08:51

## 2019-01-01 RX ADMIN — INSULIN LISPRO 2 UNITS: 100 INJECTION, SOLUTION INTRAVENOUS; SUBCUTANEOUS at 21:06

## 2019-01-01 RX ADMIN — INSULIN GLARGINE 25 UNITS: 100 INJECTION, SOLUTION SUBCUTANEOUS at 20:46

## 2019-01-01 RX ADMIN — SODIUM CHLORIDE 1000 ML: 9 INJECTION, SOLUTION INTRAVENOUS at 15:11

## 2019-01-01 RX ADMIN — SIMVASTATIN 80 MG: 40 TABLET, FILM COATED ORAL at 21:27

## 2019-01-01 RX ADMIN — INSULIN LISPRO 4 UNITS: 100 INJECTION, SOLUTION INTRAVENOUS; SUBCUTANEOUS at 18:23

## 2019-01-01 RX ADMIN — CLOPIDOGREL BISULFATE 75 MG: 75 TABLET ORAL at 08:03

## 2019-01-01 RX ADMIN — SIMVASTATIN 40 MG: 40 TABLET, FILM COATED ORAL at 20:46

## 2019-01-01 RX ADMIN — INSULIN GLARGINE 25 UNITS: 100 INJECTION, SOLUTION SUBCUTANEOUS at 21:06

## 2019-01-01 RX ADMIN — ISOSORBIDE MONONITRATE 30 MG: 60 TABLET, EXTENDED RELEASE ORAL at 10:00

## 2019-01-01 RX ADMIN — SODIUM CHLORIDE: 9 INJECTION, SOLUTION INTRAVENOUS at 16:48

## 2019-01-01 RX ADMIN — LEVETIRACETAM 250 MG: 250 TABLET ORAL at 20:46

## 2019-01-01 RX ADMIN — METOPROLOL SUCCINATE 25 MG: 25 TABLET, EXTENDED RELEASE ORAL at 09:04

## 2019-01-01 RX ADMIN — ESCITALOPRAM OXALATE 20 MG: 10 TABLET ORAL at 08:51

## 2019-01-01 RX ADMIN — LISINOPRIL 20 MG: 20 TABLET ORAL at 10:00

## 2019-01-01 RX ADMIN — INSULIN LISPRO 2 UNITS: 100 INJECTION, SOLUTION INTRAVENOUS; SUBCUTANEOUS at 12:30

## 2019-01-01 RX ADMIN — SODIUM CHLORIDE: 9 INJECTION, SOLUTION INTRAVENOUS at 12:27

## 2019-01-01 RX ADMIN — INSULIN HUMAN 10 UNITS: 100 INJECTION, SOLUTION PARENTERAL at 15:11

## 2019-01-01 RX ADMIN — ISOSORBIDE MONONITRATE 30 MG: 30 TABLET, EXTENDED RELEASE ORAL at 08:58

## 2019-01-01 RX ADMIN — ASPIRIN 81 MG: 81 TABLET ORAL at 08:02

## 2019-01-01 RX ADMIN — ENOXAPARIN SODIUM 40 MG: 40 INJECTION SUBCUTANEOUS at 08:03

## 2019-01-01 RX ADMIN — SODIUM CHLORIDE: 9 INJECTION, SOLUTION INTRAVENOUS at 12:22

## 2019-01-01 RX ADMIN — SODIUM CHLORIDE: 9 INJECTION, SOLUTION INTRAVENOUS at 17:39

## 2019-01-01 RX ADMIN — LEVETIRACETAM 500 MG: 500 TABLET ORAL at 21:26

## 2019-01-01 RX ADMIN — INSULIN LISPRO 5 UNITS: 100 INJECTION, SOLUTION INTRAVENOUS; SUBCUTANEOUS at 20:46

## 2019-01-01 RX ADMIN — CEFTRIAXONE 1 G: 1 INJECTION, POWDER, FOR SOLUTION INTRAMUSCULAR; INTRAVENOUS at 13:50

## 2019-01-01 RX ADMIN — CLOPIDOGREL BISULFATE 75 MG: 75 TABLET ORAL at 10:00

## 2019-01-01 ASSESSMENT — ENCOUNTER SYMPTOMS
COLOR CHANGE: 0
COLOR CHANGE: 0
COUGH: 0
SINUS PAIN: 0
ABDOMINAL PAIN: 0
ORTHOPNEA: 0
ABDOMINAL DISTENTION: 0
RHINORRHEA: 0
ABDOMINAL PAIN: 0
BLOOD IN STOOL: 0
CONSTIPATION: 0
SHORTNESS OF BREATH: 0
BACK PAIN: 0
RHINORRHEA: 0
DIARRHEA: 0
NAUSEA: 0
VOMITING: 0
NAUSEA: 0
CHEST TIGHTNESS: 0
NAUSEA: 0
COUGH: 0
CHEST TIGHTNESS: 0
NAUSEA: 0
SHORTNESS OF BREATH: 1
WHEEZING: 0
ABDOMINAL PAIN: 0
BACK PAIN: 0
SORE THROAT: 0
HEARTBURN: 0
DIARRHEA: 0
DIARRHEA: 0
PHOTOPHOBIA: 0
SHORTNESS OF BREATH: 0
EYE PAIN: 0
FACIAL SWELLING: 1
VOMITING: 0
VOMITING: 0
BACK PAIN: 0
VOMITING: 0
COUGH: 0
EYE PAIN: 0
RHINORRHEA: 0
COUGH: 0
SORE THROAT: 0
BACK PAIN: 0
SHORTNESS OF BREATH: 0
PHOTOPHOBIA: 0
ABDOMINAL PAIN: 0
BACK PAIN: 0
BLURRED VISION: 0

## 2019-01-01 ASSESSMENT — PAIN SCALES - GENERAL
PAINLEVEL_OUTOF10: 0
PAINLEVEL_OUTOF10: 2
PAINLEVEL_OUTOF10: 0
PAINLEVEL_OUTOF10: 0
PAINLEVEL_OUTOF10: 1
PAINLEVEL_OUTOF10: 2
PAINLEVEL_OUTOF10: 0

## 2019-01-01 ASSESSMENT — PAIN DESCRIPTION - DESCRIPTORS
DESCRIPTORS: ACHING
DESCRIPTORS: ACHING

## 2019-01-01 ASSESSMENT — PAIN DESCRIPTION - PAIN TYPE: TYPE: ACUTE PAIN

## 2019-01-01 ASSESSMENT — PAIN DESCRIPTION - LOCATION: LOCATION: LEG

## 2019-05-07 PROBLEM — R73.9 HYPERGLYCEMIA: Status: ACTIVE | Noted: 2019-01-01

## 2019-05-07 NOTE — PROGRESS NOTES
4 Eyes Skin Assessment    Odilia Alegria is being assessed upon: Admission    I agree that I, Jl Ovalle, along with Eve Shanks  (either 2 RN's or 1 LPN and 1 RN) have performed a thorough Head to Toe Skin Assessment on the patient. ALL assessment sites listed below have been assessed. Areas assessed by both nurses:     [x]   Head, Face, and Ears   [x]   Shoulders, Back, and Chest  [x]   Arms, Elbows, and Hands   [x]   Coccyx, Sacrum, and Ischium  [x]   Legs, Feet, and Heels    Does the Patient have Skin Breakdown?  No   Irritation to solange area-documented under skin integrity    Herb Prevention initiated: Yes  Wound Care Orders initiated: NA    [facility] nurse consulted for Pressure Injury (Stage 3,4, Unstageable, DTI, NWPT, and Complex wounds) and New or Established Ostomies: NA        Primary Nurse eSignature: Jl Ovalle RN on 5/7/2019 at 4:46 PM      Co-Signer eSignature: Electronically signed by Eve Shanks RN on 5/7/19 at 4:58 PM

## 2019-05-07 NOTE — PROGRESS NOTES
Mitesh Kemp arrived to room # 306  . Presented with: hyperglycemia  Mental Status: Patient is oriented, alert and coherent. Vitals:    05/07/19 1600   BP: 131/80   Pulse: 90   Resp: 17   Temp:    SpO2: 93%     Patient safety contract and falls prevention contract reviewed with patient Yes. Oriented Patient and Family to room. Call light within reach. Yes.   Needs, issues or concerns expressed at this time: no.      Electronically signed by David Willis RN on 5/7/2019 at 4:46 PM

## 2019-05-07 NOTE — ED PROVIDER NOTES
[facility]  eMERGENCY dEPARTMENT eNCOUnter      Pt Name: Jenn Paulson  MRN: [id number]  [place] 1940  Date of evaluation: 5/7/2019  Provider: Lula Borges MD    CHIEF COMPLAINT       Chief Complaint   Patient presents with    Fatigue       HISTORY OF PRESENT ILLNESS   (Location/Symptom, Timing/Onset, Context/Setting, Quality, Duration, Modifying Factors, Severity)  Note limiting factors. Jenn Paulson is a 78 y.o. female who presents to the emergency department with generalized weakness and fatigue for the past 3 days. Patient states that she hasn't been having some generalized and increased weakness over the past couple days. She denies any specific inciting event. She denies chest pain, difficulty breathing, abdominal pain, nausea, vomiting, diarrhea. Dates that she went on a trip with her daughter yesterday for shopping, and states that he took everything out of her. Son notes that she has difficulty with any strenuous activity, and notes that she had a difficult time keeping cereal on the sputum today. She was without any other specific complaints. According to the son the patient symptoms have been worsening with time, there are no specific alleviating or exacerbating factors. Patient does live alone, does not want to be in any skilled nursing facility, does have family that visits. Previously had some home PT and OT. Patient also notes that she has not been sleeping well, and thinks this may contribute to her symptoms. Nursing Notes were reviewed. REVIEW OF SYSTEMS    (2-9 systems for level 4,10 or more for level 5)     Review of Systems   Constitutional: Positive for fatigue. Negative for activity change, appetite change, chills and fever. HENT: Negative for congestion, nosebleeds, rhinorrhea and sore throat. Eyes: Negative for photophobia, pain and visual disturbance. Respiratory: Negative for cough, chest tightness and shortness of breath.     Cardiovascular: Negative for chest pain and palpitations. Gastrointestinal: Negative for abdominal pain, diarrhea, nausea and vomiting. Genitourinary: Negative for dysuria, flank pain and hematuria. Musculoskeletal: Negative for arthralgias, back pain, myalgias and neck pain. Skin: Negative for rash and wound. Neurological: Positive for weakness (Generalized). Negative for dizziness, syncope, speech difficulty, light-headedness and headaches. Psychiatric/Behavioral: Negative for confusion, hallucinations and suicidal ideas. PAST MEDICAL HISTORY     Past Medical History:   Diagnosis Date    Breast [facility] ([facility]     CAD (coronary artery disease), native coronary artery     Carotid artery stenosis 2014    Colon cancer ([facility] [address] [facility]     Diabetes mellitus type II     Hyperlipidemia     Cholesterol management per pcp, Sonali Padilla M.D.    Hypertension     S/P cardiac cath 11    Syncope 14    s/p loop recorder        SURGICAL HISTORY       Past Surgical History:   Procedure Laterality Date    APPENDECTOMY      CATARACT REMOVAL Left 10/2015     SECTION      CHOLECYSTECTOMY      CORONARY ANGIOPLASTY WITH STENT PLACEMENT  2018    BMS to prox Circ;  3 occluded vein grafts    CORONARY ARTERY BYPASS GRAFT  175434    LIMA to LAD (patent), SVG to intermediate branch (occluded); SVG to LAD diagonal (ocluded); SVG to RCA (subtotally occluded-stent placement )    DIAGNOSTIC CARDIAC CATH LAB PROCEDURE  192706    EF  60%    DIAGNOSTIC CARDIAC CATH LAB PROCEDURE  440015    EF 60%    DIAGNOSTIC CARDIAC CATH LAB PROCEDURE  00    EF 50%    MASTECTOMY       Right Breast    VASCULAR SURGERY  [facility]    Left carotid endarterectomy with vascular heart patch repair. Left cervical carotid arteriograms after endarterectomy.  VASCULAR SURGERY  14 [facility]    Right carotid endarterectomy with vascular patch repair. Right cervical carotid arteriograms after endarterectomy.        CURRENT MEDICATIONS Previous Medications    ASPIRIN EC 81 MG EC TABLET    Take 1 tablet by mouth daily    CHOLECALCIFEROL (VITAMIN D3) 5000 UNITS TABS    Take by mouth    CLOPIDOGREL (PLAVIX) 75 MG TABLET    Take 75 mg by mouth daily    DOXEPIN (SINEQUAN) 10 MG CAPSULE    Take 10 mg by mouth nightly    ESCITALOPRAM (LEXAPRO) 20 MG TABLET    Take 20 mg by mouth daily    INSULIN GLARGINE (BASAGLAR KWIKPEN) 100 UNIT/ML INJECTION PEN    Inject 25 Units into the skin nightly    ISOSORBIDE MONONITRATE (IMDUR) 30 MG CR TABLET    Take 30 mg by mouth daily. LEVETIRACETAM (KEPPRA) 250 MG TABLET    Take 1 tablet by mouth 2 times daily    LISINOPRIL (PRINIVIL;ZESTRIL) 20 MG TABLET    Take 20 mg by mouth Daily     METOPROLOL SUCCINATE (TOPROL XL) 25 MG EXTENDED RELEASE TABLET    Take 1 tablet by mouth daily    NITROGLYCERIN (NITROSTAT) 0.4 MG SL TABLET    Place 1 tablet under the tongue every 5 minutes as needed    SIMVASTATIN (ZOCOR) 40 MG TABLET    Take 2 tablets by mouth nightly    SITAGLIPTIN (JANUVIA) 50 MG TABLET    Take 0.5 tablets by mouth daily       ALLERGIES     Tetanus toxoids    FAMILY HISTORY       Family History   Problem Relation Age of Onset    Coronary Art Dis Mother     Diabetes Mother     Hypertension Mother     Cancer Mother     Cancer Sister     Coronary Art Dis Brother     Cancer Brother     Coronary Art Dis Maternal Grandmother     Coronary Art Dis Maternal Grandfather     Diabetes Daughter        SOCIAL HISTORY       Social History     Socioeconomic History    Marital status:       Spouse name: Not on file    Number of children: Not on file    Years of education: Not on file    Highest education level: Not on file   Occupational History    Not on file   Social Needs    Financial resource strain: Not on file    Food insecurity:     Worry: Not on file     Inability: Not on file    Transportation needs:     Medical: Not on file     Non-medical: Not on file   Tobacco Use    Smoking status: no wheezes. She has no rales. She exhibits no tenderness. Abdominal: Soft. She exhibits no distension and no mass. There is no tenderness. There is no rebound and no guarding. Musculoskeletal: Normal range of motion. She exhibits no edema, tenderness or deformity. No thoracic and lumbar spinous process tenderness, step-offs, deformities. Full range of motion in all 4 extremities. Neurological: She is alert and oriented to person, place, and time. No cranial nerve deficit. She exhibits normal muscle tone. Coordination normal.   Cranial nerves II through XII intact, 5 out of 5 bilateral upper and lower extremity strength. No pronator drift. No dysmetria. Skin: Skin is warm and dry. No rash noted. Psychiatric: She has a normal mood and affect. Her behavior is normal. Judgment and thought content normal.   Nursing note and vitals reviewed. DIAGNOSTIC RESULTS     EKG:All EKG's are interpreted by the Emergency Department Physician who either signs or Co-signs this chart in the absence of a cardiologist.    [id number]: Sinus rhythm, no ST elevations or depressions within contiguous leads consistent with STEMI or ischemia, no ectopy, normal axis and intervals. EKG is without significant morphological changes from the EKG done in October 2018. RADIOLOGY:   Non-plain film images such as CT, Ultrasound and MRI are read by theradiologist. Plain radiographic images are visualized and preliminarily interpreted by the emergency physician with the below findings:      XR CHEST PORTABLE   Final Result   1.. Left basilar densities may be atelectasis or developing   consolidation. 2. Chronic peribronchial thickening. 3. Prior mediastinal surgery. Stable borderline cardiomegaly. 4. Stable apical pleural thickening. Signed by Dr Josee Michelle on 5/7/2019 1:13 PM      CT Head WO Contrast   Final Result   No acute intracranial abnormality. Chronic ischemic and atrophic changes.    Above findings are recorded on a digital voice clip in PACS. Signed by Dr Nita Oneal on 5/7/2019 1:11 PM          LABS:  Labs Reviewed   CBC WITH AUTO DIFFERENTIAL - Abnormal; Notable for the following components:       Result Value    Neutrophils % 79.6 (*)     Lymphocytes % 12.2 (*)     All other components within normal limits   COMPREHENSIVE METABOLIC PANEL W/ REFLEX TO MG FOR LOW K - Abnormal; Notable for the following components:    Sodium 128 (*)     Chloride 91 (*)     Glucose 768 (*)     CREATININE 1.4 (*)     GFR Non- 36 (*)     All other components within normal limits    Narrative:     CALL  [facility]  K[facility] tel. ,  Chemistry results called to and read back by Albert Hughes RN in ED, 05/07/2019  14:11, by [facility]   TROPONIN - Abnormal; Notable for the following components:    Troponin 0.04 (*)     All other components within normal limits   APTT - Abnormal; Notable for the following components:    aPTT 24.9 (*)     All other components within normal limits   TSH WITHOUT REFLEX - Abnormal; Notable for the following components:    TSH 4.380 (*)     All other components within normal limits   URINE RT REFLEX TO CULTURE - Abnormal; Notable for the following components:    Glucose, Ur >=1000 (*)     Ketones, Urine TRACE (*)     All other components within normal limits   BRAIN NATRIURETIC PEPTIDE   PROTIME-INR   T4   TROPONIN   BASIC METABOLIC PANEL   BASIC METABOLIC PANEL   POCT GLUCOSE       All other labs were within normal range or not returned as of this dictation.     EMERGENCY DEPARTMENT COURSE and DIFFERENTIAL DIAGNOSIS/MDM:   Vitals:    Vitals:    05/07/19 1223 05/07/19 1500   BP: 135/76 128/74   Pulse: 91 87   Resp: 15 16   Temp: 99 °F (37.2 °C)    TempSrc: Temporal    SpO2: 93% 93%   Weight: 137 lb (62.1 kg)    Height: 5' 5\" (1.651 m)        MDM  Number of Diagnoses or Management Options  Diagnosis management comments: 58-year-old female presenting with generalized malaise and fatigue over the past few days, no specific inciting event, no abnormal findings on exam. Plan for IV, labs, EKG, urine chest x-ray and imaging, as well as social work evaluation for potential home care. Patient will be admitted if there is anything that would require admission at this point in time based off initial exam and history and find any thing concerning, disposition will be dependent upon workup results. ED Course  Patient evaluated for her generalized fatigue. Abdomen drawn and sent. Urine obtained and sent. The patient has mild decrease in her sodium combined with a mild decrease in chloride and a slight elevation of creatinine. Large amount of glucose in the patient's urine. Troponin is indeterminate and repeated with a return to normal. It was only 0.01 outside of the normal range. Suspicion for this being acute coronary syndrome causing the patient's fatigue. More likely related to the hyperglycemia which was found on her metabolic panel. Spoke with Dr. Jeanette Ross, we'll admit for further hydration and close management. Patient received 1 L of fluids and 10 units of insulin in the ED. I had a detailed discussion with the patient and/or guarding regarding the historical points, exam findings, and any diagnostic results supporting the admission diagnosis. The patient was educated on care and need for admission. Questions were invited and answered. Patient/guardian shows understanding of admission information and agrees to follow. CONSULTS:  IP CONSULT TO HOME CARE NEEDS  IP CONSULT TO PRIMARY CARE PROVIDER    PROCEDURES:  Unless otherwise noted below, none     Procedures    FINAL IMPRESSION      1. Hyperglycemia    2.  Hyponatremia: Pseudohyponatremia          DISPOSITION/PLAN   DISPOSITION        PATIENT REFERRED TO:  [facility] [address] [id number] [place] [address]  [address] [facility], [facility] [address]  [address]  [place] [id number]            DISCHARGE MEDICATIONS:  New Prescriptions    No medications on file          (Pleasenote that portions of this note were completed with a voice recognition program.  Efforts were made to edit the dictations but occasionally words are mis-transcribed.)    Amara Byrne MD (electronically signed)  Attending Emergency Physician          Amara Byrne MD  05/07/19 6021

## 2019-05-07 NOTE — CARE COORDINATION
Contacted Dieter Martinez as patient chose [facility]) as her home health provider.

## 2019-05-07 NOTE — CARE COORDINATION
Spoke with patient regarding MD orders for [facility] services. Patient agreeable and has chosen [facility]. Referral Faxed. [address] [place] [address] [phone]. [phone].   Electronically signed by Cynthia Oro RN on 5/7/19 at 2:03 PM

## 2019-05-07 NOTE — PLAN OF CARE
Problem: Falls - Risk of:  Goal: Will remain free from falls  Description  Will remain free from falls  Outcome: Ongoing  Goal: Absence of physical injury  Description  Absence of physical injury  Outcome: Ongoing     Problem: Risk for Impaired Skin Integrity  Goal: Tissue integrity - skin and mucous membranes  Description  Structural intactness and normal physiological function of skin and  mucous membranes.   Outcome: Ongoing     Problem: Discharge Planning:  Goal: Discharged to appropriate level of care  Description  Discharged to appropriate level of care  Outcome: Ongoing     Problem: Serum Glucose Level - Abnormal:  Goal: Ability to maintain appropriate glucose levels will improve  Description  Ability to maintain appropriate glucose levels will improve  Outcome: Ongoing     Problem: Sensory Perception - Impaired:  Goal: Ability to maintain a stable neurologic state will improve  Description  Ability to maintain a stable neurologic state will improve  Outcome: Ongoing

## 2019-05-08 NOTE — H&P
[facility] [place] [facility] [address] [facility]                              HISTORY AND PHYSICAL    PATIENT NAME: Megan Hernandez                      :        1940  MED REC NO:   [id number]                              ROOM:       [facility]  ACCOUNT NO:   [de-identified]                           ADMIT DATE: 2019  PROVIDER:     Paul Jaime MD    HISTORY OF PRESENT ILLNESS:  This is an 78year-old admitted with  generalized weakness and fatigue, found to have severe hyperglycemia and  hyponatremia related to that. She apparently has been feeling poorly  for a few days. Denied any chest pain or shortness of breath. PAST MEDICAL HISTORY:  Significant for diabetes, hypertension, ischemic  heart disease, colon cancer, hyperlipidemia, and carotid stenosis. PAST SURGICAL HISTORY:  She has had vascular surgery, left carotid  endarterectomy, right carotid endarterectomy, prior mastectomy for  breast cancer. She had colon resection for colon cancer, coronary  artery bypass with also some angioplasties and stents, last time in  2018. She had appendectomy, cholecystectomy, prior cataract removal.    FAMILY HISTORY:  For heart disease, diabetes. SOCIAL HISTORY:  She is former smoker, does not use alcohol. She lives  alone. CURRENT MEDICATIONS:  Her medicines on admission, Basaglar 25 units  nightly; Lexapro 20 mg daily; simvastatin 40 mg nightly; metoprolol XL  25 daily; Keppra 250 nightly; Plavix 75 daily; aspirin 81 daily; Januvia  50 mg, she takes half of that daily; doxepin 10 mg nightly; vitamin D  5000 units daily; lisinopril 20 mg daily; Imdur 30 mg daily and  nitroglycerin as needed. ALLERGIES:  She is allergic to TETANUS. PHYSICAL EXAMINATION:  VITAL SIGNS:  Blood pressure 105/59, her pulse is 63, respirations 16,  temperature 98.1 and O2 sats 95% on room air. HEENT:  Okay. NECK:  Supple. CHEST:  Clear. HEART:  Regular.   ABDOMEN: Benign. EXTREMITIES:  No clubbing, cyanosis or edema. LABORATORY DATA:  Sodium this morning is 141, potassium 3.8; initially,  sodium of 128. Blood sugar was 768 on presentation, it is 180 this  morning. LFTs were normal.  White count was normal at 8.7, hemoglobin  13.9 and platelets 497. Urine was negative. EKG was negative. CT of  the head was negative for any acute findings. Chest x-ray showed left  basilar densities, possible atelectasis. ASSESSMENT:  1. Profound hyperglycemia with pseudohyponatremia, resolved. 2.  Confusion and weakness due to that. 3.  Chronic issues including hypertension, hyperlipidemia, diabetes  mellitus with neuropathy.     PLAN:  At this point is to try to adjust her regimen for her diabetes  and to educate related to that, see orders for further details        Soren Jacome MD    D: 05/08/2019 8:42:33      T: 05/08/2019 11:55:27     [id number]  Job#: [id number]     Doc#: [id number]    CC:

## 2019-05-08 NOTE — PROGRESS NOTES
Pt currently sitting in bed watching TV and eating breakfast. Comfortable and denies any pain or discomfort. States that her cramps from last night have improved.

## 2019-05-09 NOTE — PROGRESS NOTES
Progress Note  5/9/2019 7:00 AM  Subjective:   Admit Date: 5/7/2019  PCP: Marco A Merida MD    Interval History: Better. DIET CARB CONTROL; Intake/Output Summary (Last 24 hours) at 5/9/2019 0700  Last data filed at 5/8/2019 1724  Gross per 24 hour   Intake 1505 ml   Output --   Net 1505 ml     Medications:      dextrose      sodium chloride 50 mL/hr at 05/08/19 1227      linagliptin  5 mg Oral Dinner    insulin lispro  0-18 Units Subcutaneous TID WC    insulin lispro  0-9 Units Subcutaneous Nightly    aspirin EC  81 mg Oral Daily    clopidogrel  75 mg Oral Daily    doxepin  10 mg Oral Nightly    escitalopram  20 mg Oral Daily    insulin glargine  25 Units Subcutaneous Nightly    lisinopril  20 mg Oral Daily    metoprolol succinate  25 mg Oral Daily    simvastatin  40 mg Oral Nightly    isosorbide mononitrate  30 mg Oral Daily    levETIRAcetam  250 mg Oral Nightly     Recent Labs     05/07/19  1314 05/09/19  0312   WBC 8.7 5.9   HGB 13.9 10.8*    169     Recent Labs     05/08/19  0006 05/08/19  0615 05/09/19  0312    141 142   K 4.3 3.8 4.2    110 112*   CO2 25 21* 22   BUN 20 17 20   CREATININE 1.2* 0.9 1.1*   GLUCOSE 223* 180* 177*     Recent Labs     05/07/19  1314   AST 17   ALT 15   BILITOT 0.5   ALKPHOS 96       Objective:   Vitals: /63   Pulse 57   Temp 97.4 °F (36.3 °C) (Temporal)   Resp 17   Ht 5' 5\" (1.651 m)   Wt 138 lb 1.6 oz (62.6 kg)   SpO2 93%   BMI 22.98 kg/m²   General appearance: alert and cooperative with exam  Lungs: clear to auscultation bilaterally  Heart: regular rate and rhythm, S1, S2 normal, no murmur, click, rub or gallop  Abdomen: soft, non-tender; bowel sounds normal; no masses,  no organomegaly  Extremities: extremities normal, atraumatic, no cyanosis or edema  Neurologic: No obvious focal neurologic deficits. Assessment and Plan:   1. Hyperglycemia: Better  2.  Hyponatremia: Resolved    Advance Directive: Prior  DVT prophylaxis

## 2019-05-09 NOTE — CARE COORDINATION
[address] [address] notified of patient discharge today. DC Summary and DC med list faxed.   Electronically signed by Lydia Bautista RN on 5/9/19 at [address] PM

## 2019-05-10 NOTE — DISCHARGE SUMMARY
05/09/2019 8:25:55      T: 05/10/2019 6:34:19     [id number]  Job#: [id number]     Doc#: [id number]    CC:

## 2019-06-27 NOTE — PROGRESS NOTES
[facility], [address] [place] [address], [address] [address]  Phone: [phone]  Fax: [phone]    OFFICE VISIT:  6/27/2019    [address]: 1940    Reason For Visit:  Lynnette Ramirez is a 78 y.o. female who is here for follow-up for CAD    HPI   Patient's her follow-up with a history of CAD and a previous history of syncope for which she had a loop recorder which was removed several years ago. She had a bare-metal stent placed to the circumflex in November 2018 history of CABG. She denies chest pain, unusual dyspnea, orthopnea, PND, edema, syncope or palpitations. She now lives at the [facility], senior group home facility. Patient son sets up her medications due to the fact that she has dementia. She often forgets to take the medications even though they are set up. Her son tries to call and remind her frequently    Crystal Duff MD is PCP.   Wei Ruvalcaba has the following history as recorded in [facility]:    Patient Active Problem List    Diagnosis Date Noted    Hyperglycemia 05/07/2019    Vomiting and diarrhea 12/30/2018    MANOJ (acute kidney injury) ([address] 12/29/2018    Angina pectoris ([address] 10/29/2018    Spells of decreased attentiveness     Difficulty with speech     History of stroke     History of carotid artery disease     Near syncope 10/07/2018    Syncope and collapse 01/25/2018    Hypoesthesia of skin 01/25/2018    Diabetes mellitus ([address]     Essential hypertension     Late onset Alzheimer's disease without behavioral disturbance     Stage 3 chronic kidney disease ([address]     TIA (transient ischemic attack) 12/30/2017    Acute diarrhea 03/28/2016    Gastroenteritis     Nausea and vomiting     Status post placement of implantable loop recorder 10/12/2015    Carotid artery stenosis 01/22/2014    Syncope 01/09/2014    Coronary atherosclerosis of native coronary artery 06/23/2011    Mixed hyperlipidemia 06/23/2011    Essential hypertension, benign 2011    Coronary atherosclerosis of artery bypass graft 2011     Past Medical History:   Diagnosis Date    [facility]     CAD (coronary artery disease), native coronary artery     Carotid artery stenosis 2014    [facility]     Diabetes mellitus type II     Hyperlipidemia     Cholesterol management per pcp, Gretchen Garcia M.D.    Hypertension     S/P cardiac cath 11    Syncope 14    s/p loop recorder      Past Surgical History:   Procedure Laterality Date    APPENDECTOMY      CATARACT REMOVAL Left 10/2015     SECTION      CHOLECYSTECTOMY      CORONARY ANGIOPLASTY WITH STENT PLACEMENT  2018    BMS to prox Circ;  3 occluded vein grafts    CORONARY ARTERY BYPASS GRAFT  [id number]    LIMA to LAD (patent), SVG to intermediate branch (occluded); SVG to LAD diagonal (ocluded); SVG to RCA (subtotally occluded-stent placement )    DIAGNOSTIC CARDIAC CATH LAB PROCEDURE  059825    EF  60%    DIAGNOSTIC CARDIAC CATH LAB PROCEDURE  008939    EF 60%    DIAGNOSTIC CARDIAC CATH LAB PROCEDURE  00    EF 50%    MASTECTOMY       Right Breast    VASCULAR SURGERY  14 [facility]    Left carotid endarterectomy with vascular heart patch repair. Left cervical carotid arteriograms after endarterectomy.  VASCULAR SURGERY  14 [facility]    Right carotid endarterectomy with vascular patch repair. Right cervical carotid arteriograms after endarterectomy. Family History   Problem Relation Age of Onset    Coronary Art Dis Mother     Diabetes Mother     Hypertension Mother     Cancer Mother     Cancer Sister     Coronary Art Dis Brother     Cancer Brother     Coronary Art Dis Maternal Grandmother     Coronary Art Dis Maternal Grandfather     Diabetes Daughter      Social History     Tobacco Use    Smoking status: Former Smoker    Smokeless tobacco: Never Used    Tobacco comment: quit around . Substance Use Topics    Alcohol use:  No Current Outpatient Medications   Medication Sig Dispense Refill    isosorbide mononitrate (IMDUR) 30 MG extended release tablet Take 1 tablet by mouth daily 90 tablet 3    insulin glargine (BASAGLAR KWIKPEN) 100 UNIT/ML injection pen Inject 25 Units into the skin nightly      escitalopram (LEXAPRO) 20 MG tablet Take 20 mg by mouth daily      simvastatin (ZOCOR) 40 MG tablet Take 2 tablets by mouth nightly 30 tablet 5    metoprolol succinate (TOPROL XL) 25 MG extended release tablet Take 1 tablet by mouth daily 30 tablet 5    levETIRAcetam (KEPPRA) 250 MG tablet Take 1 tablet by mouth 2 times daily (Patient taking differently: Take 250 mg by mouth nightly ) 60 tablet 3    clopidogrel (PLAVIX) 75 MG tablet Take 75 mg by mouth daily      aspirin EC 81 MG EC tablet Take 1 tablet by mouth daily 30 tablet 3    SITagliptin (JANUVIA) 50 MG tablet Take 0.5 tablets by mouth daily 30 tablet 3    doxepin (SINEQUAN) 10 MG capsule Take 10 mg by mouth nightly      Cholecalciferol (VITAMIN D3) 5000 units TABS Take by mouth      nitroGLYCERIN (NITROSTAT) 0.4 MG SL tablet Place 1 tablet under the tongue every 5 minutes as needed 25 tablet 3    lisinopril (PRINIVIL;ZESTRIL) 20 MG tablet Take 20 mg by mouth Daily        No current facility-administered medications for this visit. Allergies: Tetanus toxoids    Review of Systems  Review of Systems   Constitutional: Positive for malaise/fatigue. Negative for chills and fever. HENT: Negative for congestion. Eyes: Negative for blurred vision. Respiratory: Positive for shortness of breath (mild). Negative for cough. Cardiovascular: Negative for chest pain, palpitations, orthopnea, leg swelling and PND. Gastrointestinal: Negative for blood in stool, heartburn, nausea and vomiting. Musculoskeletal: Negative for back pain. Skin: Negative for rash. Neurological: Negative for sensory change, speech change and focal weakness.         Episodes of numbness throughout total body   Endo/Heme/Allergies: Does not bruise/bleed easily. Psychiatric/Behavioral: Positive for memory loss. Negative for depression and suicidal ideas. The patient is not nervous/anxious and does not have insomnia. Objective  Vital Signs - /64   Pulse 60   Ht 5' 5\" (1.651 m)   Wt 132 lb (59.9 kg)   BMI 21.97 kg/m²   Physical Exam   Constitutional: She is oriented to person, place, and time. She appears well-developed and well-nourished. No distress. disheveled   HENT:   Head: Normocephalic and atraumatic. Eyes: Pupils are equal, round, and reactive to light. Right eye exhibits no discharge. Left eye exhibits no discharge. Neck: No JVD present. No tracheal deviation present. Cardiovascular: Normal rate, regular rhythm, normal heart sounds and intact distal pulses. Exam reveals no gallop and no friction rub. No murmur heard. No carotid bruit   Pulmonary/Chest: Effort normal and breath sounds normal. No respiratory distress. She has no wheezes. She has no rales. Abdominal: Soft. There is no tenderness. Musculoskeletal: She exhibits no edema. Neurological: She is alert and oriented to person, place, and time. No cranial nerve deficit. Skin: Skin is warm and dry. No rash noted. Psychiatric: She has a normal mood and affect. Her behavior is normal.   Mild confusion and memory issues   Nursing note and vitals reviewed. Zio Patch 10/30/18  Impression:  This ZIO Patch shows no evidence of significant   supraventricular or ventricular ectopy and is without prolonged   Pauses. Cardiac Cath 11/2/18  Summary:     1.  Successful femoral artery ultrasound  2. Successful femoral artery arterogram  3. Triple vessel coronary artery disease involving the proximal LAD, proximal circumflex and RCA  4. Left ventricular function is normal   5.  80% lesion in the proximal circumflex  6.   Successful percutaneous coronary intervention utilizing a single bare metal stent to the proximal circumflex. 7.  Occluded vein graft to the ramus intermedius  8. Occluded vein graft to the obtuse marginal  9. Occluded vein graft to the RCA  10. Patent, un grafted, right AGUSTIN     Echo 12/31/17  Summary   1. Normal bubble study   2. Normal LV systolic function (estimated LV EF 60-65%)   3. Mild mitral regurgitation   4. Mild tricuspid regurgitation with estimated RVSP of 34 mm Hg      Assessment:     Diagnosis Orders   1. Atherosclerosis of coronary artery bypass graft of native heart without angina pectoris     2. Essential hypertension     3. Near syncope     4. Type 2 diabetes mellitus with complication, with long-term current use of insulin ([facility]     5. Late onset Alzheimer's disease without behavioral disturbance       Patient is taking medications as prescribed    Atherosclerosis of coronary artery-stable without angina symptoms. Continue aspirin and Plavix  Hypertension-well controlled  Hyperlipidemia-on statin therapy. Syncope-none   Alzheimer's dementia- causes issues with medication compliance. Medications are set up by her son phone call reminders and patient still forgets to take them at times. Stable cardiovascular status. No evidence of overt heart failure, angina or dysrhythmia. Plan    Followup With LELE 6mo  Be sure to take meds regularly    Call with any questions or concerns  Follow up with Rey Benton MD for non cardiac problems  Report any new problems  Cardiovascular Fitness-Exercise as tolerated. Strive for 15 minutes of exercise most days of the week. Cardiac / Healthy Diet  Continue current medications as directed  Continue plan of treatment  It is always recommended that you bring your medications bottles with you to each visit - this is for your safety!        LELE Candelario

## 2019-06-27 NOTE — PATIENT INSTRUCTIONS
Followup With LELE 6mo  Be sure to take meds regularly    Call with any questions or concerns  Follow up with Robb Johnson MD for non cardiac problems  Report any new problems  Cardiovascular Fitness-Exercise as tolerated. Strive for 15 minutes of exercise most days of the week. Cardiac / Healthy Diet  Continue current medications as directed  Continue plan of treatment  It is always recommended that you bring your medications bottles with you to each visit - this is for your safety! Patient Education        Learning About Coronary Artery Disease (CAD)  What is coronary artery disease? Coronary artery disease (CAD) occurs when plaque builds up in the arteries that bring oxygen-rich blood to your heart. Plaque is a fatty substance made of cholesterol, calcium, and other substances in the blood. This process is called hardening of the arteries, or atherosclerosis. What happens when you have coronary artery disease? · Plaque may narrow the coronary arteries. Narrowed arteries cause poor blood flow. This can lead to angina symptoms such as chest pain or discomfort. If blood flow is completely blocked, you could have a heart attack. · You can slow CAD and reduce the risk of future problems by making changes in your lifestyle. These include quitting smoking and eating heart-healthy foods. · Treatments for CAD, along with changes in your lifestyle, can help you live a longer and healthier life. How can you prevent coronary artery disease? · Do not smoke. It may be the best thing you can do to prevent heart disease. If you need help quitting, talk to your doctor about stop-smoking programs and medicines. These can increase your chances of quitting for good. · Be active. Get at least 30 minutes of exercise on most days of the week. Walking is a good choice. You also may want to do other activities, such as running, swimming, cycling, or playing tennis or team sports. · Eat heart-healthy foods.  Eat more fruits and vegetables and less foods that contain saturated and trans fats. Limit alcohol, sodium, and sweets. · Stay at a healthy weight. Lose weight if you need to. · Manage other health problems such as diabetes, high blood pressure, and high cholesterol. · Manage stress. Stress can hurt your heart. To keep stress low, talk about your problems and feelings. Don't keep your feelings hidden. · If you have talked about it with your doctor, take a low-dose aspirin every day. Aspirin can help certain people lower their risk of a heart attack or stroke. But taking aspirin isn't right for everyone, because it can cause serious bleeding. Do not start taking daily aspirin unless your doctor knows about it. How is coronary artery disease treated? · Your doctor will suggest that you make lifestyle changes. For example, your doctor may ask you to eat healthy foods, quit smoking, lose extra weight, and be more active. · You will have to take medicines. · Your doctor may suggest a procedure to open narrowed or blocked arteries. This is called angioplasty. Or your doctor may suggest using healthy blood vessels to create detours around narrowed or blocked arteries. This is called bypass surgery. Follow-up care is a key part of your treatment and safety. Be sure to make and go to all appointments, and call your doctor if you are having problems. It's also a good idea to know your test results and keep a list of the medicines you take. Where can you learn more? Go to https://walt.[employer]. org and sign in to your [employer] account. Enter [phone] in the [facility] box to learn more about \"Learning About Coronary Artery Disease (CAD). \"     If you do not have an account, please click on the \"Sign Up Now\" link. Current as of: July 22, 2018  Content Version: 12.0  © 4919-9543 Healthwise, Incorporated. Care instructions adapted under license by [facility] ([facility]).  If you have questions about a medical

## 2019-07-03 NOTE — H&P
[employer]. Her son and daughters are  looking on her frequently. FAMILY HISTORY:  Positive for heart disease, diabetes, and cancer in the  mother. Sister had cancer. Brother had cancer, and multiple family  members had heart disease. Mother, brother, maternal grandmother and  grandfather, and her daughter have diabetes. MEDICATIONS:  Her medicines on admission include Imdur 30 mg daily; she  takes insulin 25 units daily; Lexapro 20 mg daily; Zocor 80 mg daily at  night; Toprol-XL 25 mg daily; Keppra 250 mg supposed to be twice daily,  but she has only been taking once daily; Plavix 75 mg daily; aspirin 81  mg daily; Januvia 50 mg, she takes half of that once daily; doxepin 10  mg nightly; vitamin D 5000 units daily; nitroglycerin as needed; and  Prinivil 20 mg daily. ALLERGIES:  She has no known drug allergies. PHYSICAL EXAMINATION:  VITAL SIGNS:  Blood pressure is 148/68, pulse 53, respirations 16,  temperature 97. She has had no significant fever or temperature  elevation here in the hospital.  O2 sats 94% on room air. HEENT:  Looks normal.  NECK:  Supple. CHEST:  Sounds clear. HEART:  Regular. ABDOMEN:  Benign. EXTREMITIES:  No clubbing, cyanosis or edema. LABORATORY DATA:  Sodium 138, potassium 4.4, chloride is 106, CO2 is 23,  BUN 14, creatinine is 1.1, GFR is 48, glucose 268 last night, this  morning is 183. LFTs are normal.  TSH is normal.  White count is 6.2,  hemoglobin 10.2, platelets 214,879. Urine has been sent for culture. There are 50 to 75 white cells, moderate leukocyte esterase, and 1+  bacteria; pH 7.40, pCO2 39, pO2 71. CT of the brain was negative for  anything acute. ASSESSMENT:  1.  Loss of consciousness, possible seizure. 2.  UTI. 3.  Diabetes with neuropathy and nephropathy. 4.  Coronary artery disease. 5.  Hypertension. PLAN:  At this point, we will treat her urinary tract infection.   Dr. Milton Santana has seen the patient and has ordered a workup for the possible  seizure. He has also increased her Keppra to 500 mg b.i.d., and we will  follow her closely.         Danyell Bradford MD    D: 07/03/2019 7:56:05      T: 07/03/2019 9:09:06     [email or web address]  Job#: [id number]     Doc#: [id number]    CC:

## 2019-07-04 NOTE — PROGRESS NOTES
Dale Crew arrived to room # 332. Presented with: uti  Mental Status: Patient is oriented, alert, coherent, logical, thought processes intact and able to concentrate and follow conversation. Vitals:    07/02/19 1505   BP: 125/68   Pulse: 58   Resp: 18   Temp: 98 °F (36.7 °C)   SpO2: 97%     Patient safety contract and falls prevention contract reviewed with patient Yes. Oriented Patient and Family to room. Call light within reach. Yes.   Needs, issues or concerns expressed at this time: no.      Electronically signed by Monroe Rivers RN on 7/2/2019 at 3:06 PM
extremities normal, atraumatic, no cyanosis or edema  Neurologic: No obvious focal neurologic deficits. Assessment and Plan:   1. Syncope: MRI negative for acute problem. CUS <50%, bilaterally. 2. UTI: Continue abx    Advance Directive: Full Code  DVT prophylaxis with enoxaparin 40 mg sub-Q daily. Discharge planning: Active Problems:    Syncope and collapse  Resolved Problems:    * No resolved hospital problems.  Dharmesh Lujan MD
bowel sounds normal; no masses,  no organomegaly  Extremities: extremities normal, atraumatic, no cyanosis or edema  Neurologic:  Alert, oriented to [facility] but not date. No dysarthria. Speech is fluent. EOMI, PERRL, VFF. No facial weakness. Limb strength is normal.  No pronator drift. Rapid alternating movements are normal.  Finger to nose testing shows no dysmetria. Assessment:   1. Seizures. 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 3. UTI  4. Alzheimer's disease. She was started on Aricept at one point but did not follow up. I discussed the above with her and her daughter. Plan:   1. Continue Keppra, Plavix, ASA, Zocor  2. OK neurologically to go home. She does not drive. She should follow up with Dr. Lucille More in 2 weeks. Discharge planning:   Home    Reviewed treatment plans with the patient and/or family. 35 minutes spent in face to face interaction and coordination of care.      Electronically signed by Daniela Centeno MD on 7/4/2019 at 2:20 PM

## 2019-07-30 NOTE — PROGRESS NOTES
EC 81 mg daily  Recommend continue Plavix 75 mg daily  Strongly encourage start/continue statin therapy  Recommend no smoking  I explained to the patient and her son that with atherosclerotic disease we typically follow patient's for life. However if she does not wish to due annual testing and follow up's, that is her chose. I instructed the patient and her son to proceed to the emergency room with any symptoms of lateralizing weakness, loss of vision in one eye, or episodes slurred speech. They verbalized understanding.        Follow up PRN

## 2020-01-01 ENCOUNTER — APPOINTMENT (OUTPATIENT)
Dept: GENERAL RADIOLOGY | Age: 80
End: 2020-01-01
Payer: MEDICARE

## 2020-01-01 ENCOUNTER — HOSPITAL ENCOUNTER (EMERGENCY)
Age: 80
Discharge: HOSPICE | End: 2020-02-09
Attending: EMERGENCY MEDICINE
Payer: MEDICARE

## 2020-01-01 ENCOUNTER — OFFICE VISIT (OUTPATIENT)
Dept: CARDIOLOGY | Age: 80
End: 2020-01-01
Payer: MEDICARE

## 2020-01-01 ENCOUNTER — APPOINTMENT (OUTPATIENT)
Dept: CT IMAGING | Age: 80
End: 2020-01-01
Payer: MEDICARE

## 2020-01-01 VITALS
DIASTOLIC BLOOD PRESSURE: 82 MMHG | SYSTOLIC BLOOD PRESSURE: 148 MMHG | BODY MASS INDEX: 24.49 KG/M2 | WEIGHT: 147 LBS | HEIGHT: 65 IN | HEART RATE: 60 BPM

## 2020-01-01 VITALS
BODY MASS INDEX: 24.96 KG/M2 | HEART RATE: 101 BPM | DIASTOLIC BLOOD PRESSURE: 68 MMHG | WEIGHT: 150 LBS | RESPIRATION RATE: 14 BRPM | OXYGEN SATURATION: 100 % | SYSTOLIC BLOOD PRESSURE: 153 MMHG | TEMPERATURE: 99.8 F

## 2020-01-01 LAB
ALBUMIN SERPL-MCNC: 3.9 G/DL (ref 3.5–5.2)
ALP BLD-CCNC: 61 U/L (ref 35–104)
ALT SERPL-CCNC: 11 U/L (ref 5–33)
ANION GAP SERPL CALCULATED.3IONS-SCNC: 23 MMOL/L (ref 7–19)
AST SERPL-CCNC: 16 U/L (ref 5–32)
BACTERIA: NEGATIVE /HPF
BASE EXCESS ARTERIAL: -6.2 MMOL/L (ref -2–2)
BASOPHILS ABSOLUTE: 0 K/UL (ref 0–0.2)
BASOPHILS RELATIVE PERCENT: 0 % (ref 0–1)
BILIRUB SERPL-MCNC: 0.4 MG/DL (ref 0.2–1.2)
BILIRUBIN URINE: ABNORMAL
BLOOD, URINE: ABNORMAL
BUN BLDV-MCNC: 31 MG/DL (ref 8–23)
CALCIUM SERPL-MCNC: 8.8 MG/DL (ref 8.8–10.2)
CARBOXYHEMOGLOBIN ARTERIAL: 0.9 % (ref 0–5)
CHLORIDE BLD-SCNC: 92 MMOL/L (ref 98–111)
CLARITY: ABNORMAL
CO2: 20 MMOL/L (ref 22–29)
COLOR: ABNORMAL
CREAT SERPL-MCNC: 2.9 MG/DL (ref 0.5–0.9)
EKG P AXIS: 69 DEGREES
EKG P-R INTERVAL: 126 MS
EKG Q-T INTERVAL: 302 MS
EKG QRS DURATION: 90 MS
EKG QTC CALCULATION (BAZETT): 415 MS
EKG T AXIS: 115 DEGREES
EOSINOPHILS ABSOLUTE: 0 K/UL (ref 0–0.6)
EOSINOPHILS RELATIVE PERCENT: 0 % (ref 0–5)
EPITHELIAL CELLS, UA: 1 /HPF (ref 0–5)
GFR NON-AFRICAN AMERICAN: 16
GLUCOSE BLD-MCNC: 696 MG/DL (ref 74–109)
GLUCOSE BLD-MCNC: >550 MG/DL (ref 70–99)
GLUCOSE URINE: >=1000 MG/DL
HCO3 ARTERIAL: 20.7 MMOL/L (ref 22–26)
HCT VFR BLD CALC: 41.5 % (ref 37–47)
HEMOGLOBIN, ART, EXTENDED: 13.9 G/DL (ref 12–16)
HEMOGLOBIN: 13.7 G/DL (ref 12–16)
HYALINE CASTS: 10 /HPF (ref 0–8)
IMMATURE GRANULOCYTES #: 0.2 K/UL
KETONES, URINE: 40 MG/DL
LACTIC ACID: 5.4 MMOL/L (ref 0.5–1.9)
LEUKOCYTE ESTERASE, URINE: ABNORMAL
LYMPHOCYTES ABSOLUTE: 0.7 K/UL (ref 1.1–4.5)
LYMPHOCYTES RELATIVE PERCENT: 3 % (ref 20–40)
MCH RBC QN AUTO: 28.2 PG (ref 27–31)
MCHC RBC AUTO-ENTMCNC: 33 G/DL (ref 33–37)
MCV RBC AUTO: 85.6 FL (ref 81–99)
METHEMOGLOBIN ARTERIAL: 1 %
MONOCYTES ABSOLUTE: 2.1 K/UL (ref 0–0.9)
MONOCYTES RELATIVE PERCENT: 9 % (ref 0–10)
NEUTROPHILS ABSOLUTE: 20.8 K/UL (ref 1.5–7.5)
NEUTROPHILS RELATIVE PERCENT: 88 % (ref 50–65)
NITRITE, URINE: POSITIVE
O2 CONTENT ARTERIAL: 20.1 ML/DL
O2 SAT, ARTERIAL: 97.4 %
O2 THERAPY: ABNORMAL
PCO2 ARTERIAL: 45 MMHG (ref 35–45)
PDW BLD-RTO: 12.8 % (ref 11.5–14.5)
PERFORMED ON: ABNORMAL
PH ARTERIAL: 7.27 (ref 7.35–7.45)
PH UA: 6.5 (ref 5–8)
PLATELET # BLD: 253 K/UL (ref 130–400)
PLATELET SLIDE REVIEW: ADEQUATE
PMV BLD AUTO: 11.1 FL (ref 9.4–12.3)
PO2 ARTERIAL: 409 MMHG (ref 80–100)
POTASSIUM REFLEX MAGNESIUM: 4.6 MMOL/L (ref 3.5–5)
POTASSIUM, WHOLE BLOOD: 4.6
PROTEIN UA: 300 MG/DL
RBC # BLD: 4.85 M/UL (ref 4.2–5.4)
RBC # BLD: NORMAL 10*6/UL
RBC UA: >900 /HPF (ref 0–4)
SODIUM BLD-SCNC: 135 MMOL/L (ref 136–145)
SPECIFIC GRAVITY UA: 1.02 (ref 1–1.03)
TOTAL PROTEIN: 7.2 G/DL (ref 6.6–8.7)
TROPONIN: 0.04 NG/ML (ref 0–0.03)
URINE REFLEX TO CULTURE: YES
UROBILINOGEN, URINE: 1 E.U./DL
WBC # BLD: 23.6 K/UL (ref 4.8–10.8)
WBC UA: 48 /HPF (ref 0–5)

## 2020-01-01 PROCEDURE — 99214 OFFICE O/P EST MOD 30 MIN: CPT | Performed by: CLINICAL NURSE SPECIALIST

## 2020-01-01 PROCEDURE — 82948 REAGENT STRIP/BLOOD GLUCOSE: CPT

## 2020-01-01 PROCEDURE — 82803 BLOOD GASES ANY COMBINATION: CPT

## 2020-01-01 PROCEDURE — 81001 URINALYSIS AUTO W/SCOPE: CPT

## 2020-01-01 PROCEDURE — 93005 ELECTROCARDIOGRAM TRACING: CPT | Performed by: EMERGENCY MEDICINE

## 2020-01-01 PROCEDURE — 2700000000 HC OXYGEN THERAPY PER DAY

## 2020-01-01 PROCEDURE — 80053 COMPREHEN METABOLIC PANEL: CPT

## 2020-01-01 PROCEDURE — 2500000003 HC RX 250 WO HCPCS: Performed by: EMERGENCY MEDICINE

## 2020-01-01 PROCEDURE — 71045 X-RAY EXAM CHEST 1 VIEW: CPT

## 2020-01-01 PROCEDURE — 93010 ELECTROCARDIOGRAM REPORT: CPT | Performed by: INTERNAL MEDICINE

## 2020-01-01 PROCEDURE — 85025 COMPLETE CBC W/AUTO DIFF WBC: CPT

## 2020-01-01 PROCEDURE — 87086 URINE CULTURE/COLONY COUNT: CPT

## 2020-01-01 PROCEDURE — 84132 ASSAY OF SERUM POTASSIUM: CPT

## 2020-01-01 PROCEDURE — 31500 INSERT EMERGENCY AIRWAY: CPT

## 2020-01-01 PROCEDURE — 83605 ASSAY OF LACTIC ACID: CPT

## 2020-01-01 PROCEDURE — 6360000002 HC RX W HCPCS: Performed by: EMERGENCY MEDICINE

## 2020-01-01 PROCEDURE — 36415 COLL VENOUS BLD VENIPUNCTURE: CPT

## 2020-01-01 PROCEDURE — 2580000003 HC RX 258: Performed by: EMERGENCY MEDICINE

## 2020-01-01 PROCEDURE — 99285 EMERGENCY DEPT VISIT HI MDM: CPT

## 2020-01-01 PROCEDURE — 70450 CT HEAD/BRAIN W/O DYE: CPT

## 2020-01-01 PROCEDURE — 36600 WITHDRAWAL OF ARTERIAL BLOOD: CPT

## 2020-01-01 PROCEDURE — 84484 ASSAY OF TROPONIN QUANT: CPT

## 2020-01-01 PROCEDURE — 6360000002 HC RX W HCPCS

## 2020-01-01 RX ORDER — SUCCINYLCHOLINE CHLORIDE 20 MG/ML
INJECTION INTRAMUSCULAR; INTRAVENOUS DAILY PRN
Status: COMPLETED | OUTPATIENT
Start: 2020-01-01 | End: 2020-01-01

## 2020-01-01 RX ORDER — PROPOFOL 10 MG/ML
INJECTION, EMULSION INTRAVENOUS
Status: COMPLETED
Start: 2020-01-01 | End: 2020-01-01

## 2020-01-01 RX ORDER — SUCCINYLCHOLINE CHLORIDE 20 MG/ML
100 INJECTION INTRAMUSCULAR; INTRAVENOUS ONCE
Status: DISCONTINUED | OUTPATIENT
Start: 2020-01-01 | End: 2020-01-01

## 2020-01-01 RX ORDER — ETOMIDATE 2 MG/ML
20 INJECTION INTRAVENOUS ONCE
Status: DISCONTINUED | OUTPATIENT
Start: 2020-01-01 | End: 2020-01-01

## 2020-01-01 RX ORDER — PROPOFOL 10 MG/ML
10 INJECTION, EMULSION INTRAVENOUS ONCE
Status: DISCONTINUED | OUTPATIENT
Start: 2020-01-01 | End: 2020-01-01 | Stop reason: HOSPADM

## 2020-01-01 RX ORDER — ISOSORBIDE MONONITRATE 60 MG/1
60 TABLET, EXTENDED RELEASE ORAL DAILY
Qty: 90 TABLET | Refills: 3 | Status: SHIPPED | OUTPATIENT
Start: 2020-01-01

## 2020-01-01 RX ORDER — OLMESARTAN MEDOXOMIL 20 MG/1
20 TABLET ORAL DAILY
Qty: 90 TABLET | Refills: 3 | Status: SHIPPED | OUTPATIENT
Start: 2020-01-01

## 2020-01-01 RX ORDER — ETOMIDATE 2 MG/ML
INJECTION INTRAVENOUS DAILY PRN
Status: COMPLETED | OUTPATIENT
Start: 2020-01-01 | End: 2020-01-01

## 2020-01-01 RX ORDER — PROPOFOL 10 MG/ML
10 INJECTION, EMULSION INTRAVENOUS ONCE
Status: DISCONTINUED | OUTPATIENT
Start: 2020-01-01 | End: 2020-01-01

## 2020-01-01 RX ADMIN — PROPOFOL 10 MCG/KG/MIN: 10 INJECTION, EMULSION INTRAVENOUS at 17:32

## 2020-01-01 RX ADMIN — HYDROMORPHONE HYDROCHLORIDE 0.25 MG/HR: 10 INJECTION INTRAMUSCULAR; INTRAVENOUS; SUBCUTANEOUS at 20:54

## 2020-01-01 RX ADMIN — SUCCINYLCHOLINE CHLORIDE 100 MG: 20 INJECTION, SOLUTION INTRAMUSCULAR; INTRAVENOUS at 17:19

## 2020-01-01 RX ADMIN — ETOMIDATE 20 MG: 2 INJECTION, SOLUTION INTRAVENOUS at 17:18

## 2020-01-01 ASSESSMENT — ENCOUNTER SYMPTOMS
SHORTNESS OF BREATH: 1
VOMITING: 0
BACK PAIN: 0
COUGH: 1
BLOOD IN STOOL: 0
HEARTBURN: 0
NAUSEA: 0
ORTHOPNEA: 0

## 2020-01-01 ASSESSMENT — PAIN SCALES - GENERAL: PAINLEVEL_OUTOF10: 2

## 2020-01-01 ASSESSMENT — PULMONARY FUNCTION TESTS
PIF_VALUE: 23

## 2020-01-09 PROBLEM — N17.9 AKI (ACUTE KIDNEY INJURY) (HCC): Status: RESOLVED | Noted: 2018-12-29 | Resolved: 2020-01-01

## 2020-01-09 PROBLEM — R11.10 VOMITING AND DIARRHEA: Status: RESOLVED | Noted: 2018-12-30 | Resolved: 2020-01-01

## 2020-01-09 PROBLEM — R19.7 VOMITING AND DIARRHEA: Status: RESOLVED | Noted: 2018-12-30 | Resolved: 2020-01-01

## 2020-01-09 NOTE — PROGRESS NOTES
[facility], [address], [address] [address]  Phone: [phone]  Fax: [phone]    OFFICE VISIT:  2020    Lindsey Ibrahim - : 1940    Reason For Visit:  Altaf Chaidez is a 78 y.o. female who is here for follow-up for CAD    HPI   Patient's her follow-up with a history of CAD and a previous history of syncope for which she had a loop recorder which was removed several years ago. She had a bare-metal stent placed to the circumflex in 2018 and history of CABG with several occluded grafts. She lives at the [facility], senior [facility] facility. Patient's son sets up her medications due to the fact that she has dementia. She occasionally awakens in the morning with complaints of chest discomfort. Her son notices she is very fatigued and may have some occasional chest discomfort with little walking. She does very little activity anymore and he takes her out in a wheelchair often. She has had a chronic dry cough for years that is very aggravating to her    Marlyslis Medina MD is PCP.   Lindsey Ibrahim has the following history as recorded in [facility]:    Patient Active Problem List    Diagnosis Date Noted    Hyperglycemia 2019    Angina pectoris ([address] 10/29/2018    Spells of decreased attentiveness     Difficulty with speech     History of stroke     History of carotid artery disease     Near syncope 10/07/2018    Syncope and collapse 2018    Hypoesthesia of skin 2018    Diabetes mellitus ([address]     Essential hypertension     Late onset Alzheimer's disease without behavioral disturbance ([address]     Stage 3 chronic kidney disease ([address]     TIA (transient ischemic attack) 2017    Acute diarrhea 2016    Gastroenteritis     Nausea and vomiting     Status post placement of implantable loop recorder 10/12/2015    Carotid artery stenosis 2014    Syncope 2014    Coronary atherosclerosis of native APRN

## 2020-01-09 NOTE — PATIENT INSTRUCTIONS
Followup With APRN 2mo  Increase Isosorbide to 60mg daily  Stop Lisinopril due to cough- may take several weeks for cough to resolve  Start Olmesartan 20mg daily instead

## 2020-02-09 PROBLEM — I25.10 DISEASE OF CARDIOVASCULAR SYSTEM: Status: ACTIVE | Noted: 2020-01-01

## 2020-02-09 NOTE — ED PROVIDER NOTES
[facility] EMERGENCY DEPT  eMERGENCY dEPARTMENT eNCOUnter      Pt Name: Lindsey Ibrahim  MRN: [id number]  [place] 1940  Date of evaluation: 2020  Provider: Kenneth Rowan MD    [address] [place] [address]       Chief Complaint   Patient presents with    Altered Mental Status         HISTORY OF PRESENT ILLNESS   (Location/Symptom, Timing/Onset,Context/Setting, Quality, Duration, Modifying Factors, Severity)  Note limiting factors. Lindsey Ibrahim is a 78 y.o. female who presents to the emergency department      The history is provided by the EMS personnel. The history is limited by the condition of the patient. Altered Mental Status   Presenting symptoms: unresponsiveness    Severity:  Severe  Most recent episode: last known well 1730 yesterday. NursingNotes were reviewed. REVIEW OF SYSTEMS    (2-9 systems for level 4, 10 or more for level 5)     Review of Systems   Unable to perform ROS: Patient unresponsive       Except as noted above the remainder of the review of systems was reviewed and negative.        PAST MEDICAL HISTORY     Past Medical History:   Diagnosis Date    Breast cancer ([facility]     CAD (coronary artery disease), native coronary artery     Carotid artery stenosis 2014    Colon cancer ([facility]     Diabetes mellitus type II     Hyperlipidemia     Cholesterol management per pcp, Curvin Kayser M.D.    Hypertension     S/P cardiac cath 11    Syncope 14    s/p loop recorder          SURGICALHISTORY       Past Surgical History:   Procedure Laterality Date    APPENDECTOMY      CATARACT REMOVAL Left 10/2015     SECTION      CHOLECYSTECTOMY      CORONARY ANGIOPLASTY WITH STENT PLACEMENT  2018    BMS to prox Circ;  3 occluded vein grafts    CORONARY ARTERY BYPASS GRAFT  104547    LIMA to LAD (patent), SVG to intermediate branch (occluded); SVG to LAD diagonal (ocluded); SVG to RCA (subtotally occluded-stent placement )    DIAGNOSTIC CARDIAC CATH LAB PROCEDURE  385762    EF 60%    DIAGNOSTIC CARDIAC CATH LAB PROCEDURE  426980    EF 60%    DIAGNOSTIC CARDIAC CATH LAB PROCEDURE  02/28/00    EF 50%    MASTECTOMY  07/13     Right Breast    VASCULAR SURGERY  1/13/14 [facility]    Left carotid endarterectomy with vascular heart patch repair. Left cervical carotid arteriograms after endarterectomy.  VASCULAR SURGERY  2/26/14 [facility]    Right carotid endarterectomy with vascular patch repair. Right cervical carotid arteriograms after endarterectomy.          CURRENT MEDICATIONS       Discharge Medication List as of 2/9/2020 10:22 PM      CONTINUE these medications which have NOT CHANGED    Details   isosorbide mononitrate (IMDUR) 60 MG extended release tablet Take 1 tablet by mouth daily, Disp-90 tablet, R-3Normal      olmesartan (BENICAR) 20 MG tablet Take 1 tablet by mouth daily, Disp-90 tablet, R-3Normal      metoprolol succinate (TOPROL XL) 25 MG extended release tablet TAKE 1 TABLET BY MOUTH ONCE DAILY, Disp-90 tablet, R-3Please consider 90 day supplies to promote better adherenceNormal      levETIRAcetam (KEPPRA) 500 MG tablet Take 1 tablet by mouth 3 times daily, Disp-60 tablet, R-5Print      SITagliptin (JANUVIA) 50 MG tablet Take 1 tablet by mouth daily, Disp-30 tablet, R-5Normal      insulin glargine (BASAGLAR KWIKPEN) 100 UNIT/ML injection pen Inject 25 Units into the skin nightlyHistorical Med      escitalopram (LEXAPRO) 20 MG tablet Take 20 mg by mouth dailyHistorical Med      simvastatin (ZOCOR) 40 MG tablet Take 2 tablets by mouth nightly, Disp-30 tablet, R-5Normal      clopidogrel (PLAVIX) 75 MG tablet Take 75 mg by mouth dailyHistorical Med      aspirin EC 81 MG EC tablet Take 1 tablet by mouth daily, Disp-30 tablet, R-3OTC      doxepin (SINEQUAN) 10 MG capsule Take 10 mg by mouth nightlyHistorical Med      Cholecalciferol (VITAMIN D3) 5000 units TABS Take by mouthHistorical Med      nitroGLYCERIN (NITROSTAT) 0.4 MG SL tablet Place 1 tablet under the tongue every 5 minutes as Withdraws from pain  Irma Coma Scale Score: 7 @FLOW(28272317)@      PHYSICAL EXAM    (up to 7 for level 4, 8 or more for level 5)     ED Triage Vitals   BP Temp Temp src Pulse Resp SpO2 Height Weight   02/09/20 1700 02/09/20 1718 -- 02/09/20 1700 02/09/20 1700 02/09/20 1700 -- 02/09/20 1729   (!) 228/90 101.4 °F (38.6 °C)  119 (!) 32 99 %  150 lb (68 kg)       Physical Exam  Vitals signs and nursing note reviewed. Constitutional:       General: She is in acute distress (mild). Comments: GCS 6, withdraws from pain   HENT:      Head: Normocephalic and atraumatic. Nose: Nose normal.      Mouth/Throat:      Pharynx: Oropharynx is clear. Comments: Mucous mm sl. dry  Eyes:      Comments: Cannot follow commands, pupils constricted but equal and do respond   Cardiovascular:      Rate and Rhythm: Regular rhythm. Tachycardia present. Heart sounds: Normal heart sounds. Pulmonary:      Effort: Respiratory distress (mild) present. Breath sounds: Normal breath sounds. Musculoskeletal:      Right lower leg: No edema. Left lower leg: No edema. Skin:     General: Skin is warm and dry. DIAGNOSTIC RESULTS     EKG: All EKG's are interpreted by the Emergency Department Physician who either signs or Co-signsthis chart in the absence of a cardiologist.    EKG: sinus tach, , probable LVH    RADIOLOGY:   Non-plain filmimages such as CT, Ultrasound and MRI are read by the radiologist. Plain radiographic images are visualized and preliminarily interpreted by the emergency physician with the below findings:        Interpretation per the Radiologist below, if available at the time ofthis note:    CT Head WO Contrast   Final Result   1. Massive left temporal intraparenchymal hemorrhage. An 8 mm   rightward midline shift is present. There is effacement of the left   lateral ventricle and entrapment of the right lateral ventricle.    2. Large amount of intraventricular and subarachnoid blood is also   seen. Findings were discussed with Dr. Deon Wang at 5:47 PM on 2/9/2020. Signed by Dr Marilyn Horta on 2/9/2020 5:47 PM      XR CHEST PORTABLE   Final Result   1. Satisfactory position of the endotracheal tube. 2. Left lower lung atelectasis.        Signed by Dr Marilyn Horta on 2/9/2020 5:31 PM            ED BEDSIDE ULTRASOUND:   Performed by ED Physician - none    LABS:  Labs Reviewed   CBC WITH AUTO DIFFERENTIAL - Abnormal; Notable for the following components:       Result Value    WBC 23.6 (*)     Neutrophils % 88.0 (*)     Lymphocytes % 3.0 (*)     Neutrophils Absolute 20.8 (*)     Lymphocytes Absolute 0.7 (*)     Monocytes Absolute 2.10 (*)     All other components within normal limits   COMPREHENSIVE METABOLIC PANEL W/ REFLEX TO MG FOR LOW K - Abnormal; Notable for the following components:    Sodium 135 (*)     Chloride 92 (*)     CO2 20 (*)     Anion Gap 23 (*)     Glucose 696 (*)     BUN 31 (*)     CREATININE 2.9 (*)     GFR Non- 16 (*)     All other components within normal limits    Narrative:     CALL  [place]  [facility] tel. ,  Chemistry results called to and read back by Meek Sparrow RN in ED, 02/09/2020  17:50, by [facility]   LACTIC ACID, PLASMA - Abnormal; Notable for the following components:    Lactic Acid 5.4 (*)     All other components within normal limits    Narrative:     [address] tel[address] ,  Chemistry results called to and read back by Meek Sparrow RN in ED, 02/09/2020  17:49, by [facility]   URINE RT REFLEX TO CULTURE - Abnormal; Notable for the following components:    Color, UA RED (*)     Clarity, UA TURBID (*)     Glucose, Ur >=1000 (*)     Bilirubin Urine LARGE (*)     Ketones, Urine 40 (*)     Blood, Urine LARGE (*)     Nitrite, Urine POSITIVE (*)     Leukocyte Esterase, Urine MODERATE (*)     All other components within normal limits   BLOOD GAS, ARTERIAL - Abnormal; Notable for the following components:    pH, Arterial 7.270 (*)     pO2, Arterial 409.0 (*)     HCO3, Arterial 20.7 (*) Umu Kaur MD  Authorized by: Mariya Kinney MD     Consent:     Consent obtained:  Emergent situation  Pre-procedure details:     Patient status:  Unresponsive    Pretreatment meds: etmoidate. Paralytics:  Succinylcholine  Procedure details:     Preoxygenation:  Bag valve mask    CPR in progress: no      Intubation method:  Oral    Oral intubation technique:  Direct    Laryngoscope blade: Mac 4    Tube size (mm):  7.5    Tube type:  Cuffed    Number of attempts:  1    Tube visualized through cords: yes    Placement assessment:     Tube secured with:  ETT ponce    Breath sounds:  Equal    Placement verification: chest rise, CXR verification, equal breath sounds and ETCO2 detector      CXR findings:  ETT in proper place  Post-procedure details:     Patient tolerance of procedure: Tolerated well, no immediate complications        FINAL IMPRESSION      1. Hemorrhagic stroke ([facility] [address]          DISPOSITION/PLAN   DISPOSITION        PATIENT REFERRED TO:  No follow-up provider specified.     DISCHARGE MEDICATIONS:  Discharge Medication List as of 2/9/2020 10:22 PM             (Please note that portions of this note were completed with a voice recognition program.  Efforts were made to edit the dictations but occasionally words are mis-transcribed.)    Mariya Kinney MD (electronically signed)  Attending Emergency Physician          Mariya Kinney MD  02/10/20 [id number]

## 2020-02-10 NOTE — ED NOTES
Spoke with Hospice nurse, they are still waiting on the admissions/field nurse for paperwork.       Becky Morales RN  02/09/20 1956

## 2020-02-10 NOTE — ED NOTES
Report via phone to Toan Antonio in [facility].       Rox Bhandari RN  02/09/20 2100       Rox Bhandari RN  02/09/20 2100

## 2020-02-11 LAB — URINE CULTURE, ROUTINE: NORMAL

## 2024-04-09 NOTE — ED PROVIDER NOTES
following components:    POC Glucose 189 (*)     All other components within normal limits   URINE CULTURE   TROPONIN   POTASSIUM, WHOLE BLOOD   TSH WITHOUT REFLEX   T4, FREE   VITAMIN B12   CBC WITH AUTO DIFFERENTIAL   BASIC METABOLIC PANEL   POCT GLUCOSE       All other labs were within normal range or not returned as of this dictation. EMERGENCY DEPARTMENT COURSE and DIFFERENTIALDIAGNOSIS/MDM:   Vitals:    Vitals:    07/02/19 1200 07/02/19 1300 07/02/19 1400 07/02/19 1505   BP: (!) 98/56 (!) 102/58 (!) 106/56 125/68   Pulse: 64 66 62 58   Resp: 17 17 18 18   Temp:  98.5 °F (36.9 °C) 98.4 °F (36.9 °C) 98 °F (36.7 °C)   TempSrc:    Temporal   SpO2: 95% 96% 95% 97%   Weight:       Height:           MDM  Patient resting comfortably no distress. Nontoxic on exam at this time. Normal neurologic exam.  She has no complaints currently. Urine does look like she has UTI so suspect this may be related to her symptoms but cannot be certain of this. May have been a seizure earlier but sounds somewhat atypical.  Case discussed with her PCP, Dr. Checo Lynn, who is agreeable plan of care and admission. Will put in bridge orders per his request and also consult neurology. Family and patient updated about plan. Patient resting comfortably at this time. CONSULTS:  IP CONSULT TO NEUROLOGY    PROCEDURES:  Unless otherwise notedbelow, none     Procedures    FINAL IMPRESSION     1. Syncope, unspecified syncope type    2. Urinary tract infection without hematuria, site unspecified    3.  Hyperglycemia          DISPOSITION/PLAN   DISPOSITION Admitted 07/02/2019 01:38:39 PM      PATIENT REFERRED TO:  @FUP@    DISCHARGE MEDICATIONS:  Current Discharge Medication List             (Please note that portions of this note were completed with a voice recognition program.  Efforts were made to edit the dictations butoccasionally words are mis-transcribed.)    Mushtaq Wagner MD (electronically signed)  AttendingEmergency Physician Never smoker